# Patient Record
Sex: FEMALE | Race: WHITE | Employment: OTHER | URBAN - METROPOLITAN AREA
[De-identification: names, ages, dates, MRNs, and addresses within clinical notes are randomized per-mention and may not be internally consistent; named-entity substitution may affect disease eponyms.]

---

## 2020-10-26 ENCOUNTER — APPOINTMENT (OUTPATIENT)
Dept: GENERAL RADIOLOGY | Facility: HOSPITAL | Age: 80
DRG: 023 | End: 2020-10-26
Attending: EMERGENCY MEDICINE
Payer: MEDICARE

## 2020-10-26 ENCOUNTER — APPOINTMENT (OUTPATIENT)
Dept: CT IMAGING | Facility: HOSPITAL | Age: 80
DRG: 023 | End: 2020-10-26
Attending: NURSE PRACTITIONER
Payer: MEDICARE

## 2020-10-26 ENCOUNTER — HOSPITAL ENCOUNTER (INPATIENT)
Facility: HOSPITAL | Age: 80
LOS: 13 days | Discharge: NURSING, REHAB OR LONG-TERM CARE | DRG: 023 | End: 2020-11-08
Attending: EMERGENCY MEDICINE | Admitting: HOSPITALIST
Payer: MEDICARE

## 2020-10-26 ENCOUNTER — APPOINTMENT (OUTPATIENT)
Dept: GENERAL RADIOLOGY | Facility: HOSPITAL | Age: 80
DRG: 023 | End: 2020-10-26
Attending: NURSE PRACTITIONER
Payer: MEDICARE

## 2020-10-26 ENCOUNTER — ANESTHESIA (OUTPATIENT)
Dept: INTERVENTIONAL RADIOLOGY/VASCULAR | Facility: HOSPITAL | Age: 80
DRG: 023 | End: 2020-10-26
Payer: MEDICARE

## 2020-10-26 ENCOUNTER — APPOINTMENT (OUTPATIENT)
Dept: INTERVENTIONAL RADIOLOGY/VASCULAR | Facility: HOSPITAL | Age: 80
DRG: 023 | End: 2020-10-26
Attending: EMERGENCY MEDICINE
Payer: MEDICARE

## 2020-10-26 ENCOUNTER — APPOINTMENT (OUTPATIENT)
Dept: CT IMAGING | Facility: HOSPITAL | Age: 80
DRG: 023 | End: 2020-10-26
Attending: INTERNAL MEDICINE
Payer: MEDICARE

## 2020-10-26 ENCOUNTER — APPOINTMENT (OUTPATIENT)
Dept: CT IMAGING | Facility: HOSPITAL | Age: 80
DRG: 023 | End: 2020-10-26
Attending: EMERGENCY MEDICINE
Payer: MEDICARE

## 2020-10-26 DIAGNOSIS — I48.92 ATRIAL FLUTTER BY ELECTROCARDIOGRAM (HCC): ICD-10-CM

## 2020-10-26 DIAGNOSIS — I63.311 CEREBROVASCULAR ACCIDENT (CVA) DUE TO THROMBOSIS OF RIGHT MIDDLE CEREBRAL ARTERY (HCC): Primary | ICD-10-CM

## 2020-10-26 PROCEDURE — 70450 CT HEAD/BRAIN W/O DYE: CPT | Performed by: NURSE PRACTITIONER

## 2020-10-26 PROCEDURE — 3E03317 INTRODUCTION OF OTHER THROMBOLYTIC INTO PERIPHERAL VEIN, PERCUTANEOUS APPROACH: ICD-10-PCS | Performed by: RADIOLOGY

## 2020-10-26 PROCEDURE — 70450 CT HEAD/BRAIN W/O DYE: CPT | Performed by: INTERNAL MEDICINE

## 2020-10-26 PROCEDURE — 03CG3Z7 EXTIRPATION OF MATTER FROM INTRACRANIAL ARTERY USING STENT RETRIEVER, PERCUTANEOUS APPROACH: ICD-10-PCS | Performed by: RADIOLOGY

## 2020-10-26 PROCEDURE — 70450 CT HEAD/BRAIN W/O DYE: CPT | Performed by: EMERGENCY MEDICINE

## 2020-10-26 PROCEDURE — 99223 1ST HOSP IP/OBS HIGH 75: CPT | Performed by: INTERNAL MEDICINE

## 2020-10-26 PROCEDURE — 74018 RADEX ABDOMEN 1 VIEW: CPT | Performed by: NURSE PRACTITIONER

## 2020-10-26 PROCEDURE — B313YZZ FLUOROSCOPY OF RIGHT COMMON CAROTID ARTERY USING OTHER CONTRAST: ICD-10-PCS | Performed by: RADIOLOGY

## 2020-10-26 PROCEDURE — B316YZZ FLUOROSCOPY OF RIGHT INTERNAL CAROTID ARTERY USING OTHER CONTRAST: ICD-10-PCS | Performed by: RADIOLOGY

## 2020-10-26 PROCEDURE — B31RYZZ FLUOROSCOPY OF INTRACRANIAL ARTERIES USING OTHER CONTRAST: ICD-10-PCS | Performed by: RADIOLOGY

## 2020-10-26 PROCEDURE — B31QYZZ FLUOROSCOPY OF CERVICO-CEREBRAL ARCH USING OTHER CONTRAST: ICD-10-PCS | Performed by: RADIOLOGY

## 2020-10-26 PROCEDURE — 72125 CT NECK SPINE W/O DYE: CPT | Performed by: NURSE PRACTITIONER

## 2020-10-26 PROCEDURE — 99291 CRITICAL CARE FIRST HOUR: CPT | Performed by: INTERNAL MEDICINE

## 2020-10-26 PROCEDURE — 72040 X-RAY EXAM NECK SPINE 2-3 VW: CPT | Performed by: EMERGENCY MEDICINE

## 2020-10-26 PROCEDURE — 70496 CT ANGIOGRAPHY HEAD: CPT | Performed by: EMERGENCY MEDICINE

## 2020-10-26 PROCEDURE — 70498 CT ANGIOGRAPHY NECK: CPT | Performed by: EMERGENCY MEDICINE

## 2020-10-26 PROCEDURE — 71045 X-RAY EXAM CHEST 1 VIEW: CPT | Performed by: EMERGENCY MEDICINE

## 2020-10-26 PROCEDURE — 99292 CRITICAL CARE ADDL 30 MIN: CPT | Performed by: INTERNAL MEDICINE

## 2020-10-26 PROCEDURE — 30233M1 TRANSFUSION OF NONAUTOLOGOUS PLASMA CRYOPRECIPITATE INTO PERIPHERAL VEIN, PERCUTANEOUS APPROACH: ICD-10-PCS | Performed by: INTERNAL MEDICINE

## 2020-10-26 RX ORDER — DILTIAZEM HYDROCHLORIDE 60 MG/1
60 TABLET, FILM COATED ORAL 2 TIMES DAILY
Status: ON HOLD | COMMUNITY
End: 2020-11-08

## 2020-10-26 RX ORDER — TRAMADOL HYDROCHLORIDE 50 MG/1
50 TABLET ORAL EVERY 6 HOURS PRN
COMMUNITY
End: 2021-01-04

## 2020-10-26 RX ORDER — ONDANSETRON 2 MG/ML
4 INJECTION INTRAMUSCULAR; INTRAVENOUS EVERY 6 HOURS PRN
Status: DISCONTINUED | OUTPATIENT
Start: 2020-10-26 | End: 2020-11-08

## 2020-10-26 RX ORDER — LABETALOL HYDROCHLORIDE 5 MG/ML
10 INJECTION, SOLUTION INTRAVENOUS EVERY 10 MIN PRN
Status: DISCONTINUED | OUTPATIENT
Start: 2020-10-26 | End: 2020-10-27

## 2020-10-26 RX ORDER — LORAZEPAM 2 MG/ML
INJECTION INTRAMUSCULAR
Status: DISCONTINUED
Start: 2020-10-26 | End: 2020-10-26 | Stop reason: WASHOUT

## 2020-10-26 RX ORDER — SODIUM CHLORIDE 9 MG/ML
INJECTION, SOLUTION INTRAVENOUS CONTINUOUS
Status: DISCONTINUED | OUTPATIENT
Start: 2020-10-26 | End: 2020-10-26

## 2020-10-26 RX ORDER — METOCLOPRAMIDE HYDROCHLORIDE 5 MG/ML
10 INJECTION INTRAMUSCULAR; INTRAVENOUS EVERY 8 HOURS PRN
Status: DISCONTINUED | OUTPATIENT
Start: 2020-10-26 | End: 2020-10-27

## 2020-10-26 RX ORDER — ACETAMINOPHEN 650 MG/1
650 SUPPOSITORY RECTAL EVERY 4 HOURS PRN
Status: DISCONTINUED | OUTPATIENT
Start: 2020-10-26 | End: 2020-10-27

## 2020-10-26 RX ORDER — SODIUM CHLORIDE, SODIUM LACTATE, POTASSIUM CHLORIDE, CALCIUM CHLORIDE 600; 310; 30; 20 MG/100ML; MG/100ML; MG/100ML; MG/100ML
INJECTION, SOLUTION INTRAVENOUS CONTINUOUS
Status: DISCONTINUED | OUTPATIENT
Start: 2020-10-26 | End: 2020-10-26

## 2020-10-26 RX ORDER — HYDRALAZINE HYDROCHLORIDE 20 MG/ML
10 INJECTION INTRAMUSCULAR; INTRAVENOUS EVERY 2 HOUR PRN
Status: DISCONTINUED | OUTPATIENT
Start: 2020-10-26 | End: 2020-10-27

## 2020-10-26 RX ORDER — METHYLPREDNISOLONE SODIUM SUCCINATE 125 MG/2ML
125 INJECTION, POWDER, LYOPHILIZED, FOR SOLUTION INTRAMUSCULAR; INTRAVENOUS ONCE AS NEEDED
Status: DISCONTINUED | OUTPATIENT
Start: 2020-10-26 | End: 2020-10-26

## 2020-10-26 RX ORDER — IPRATROPIUM BROMIDE AND ALBUTEROL SULFATE 2.5; .5 MG/3ML; MG/3ML
3 SOLUTION RESPIRATORY (INHALATION) EVERY 6 HOURS PRN
Status: DISCONTINUED | OUTPATIENT
Start: 2020-10-26 | End: 2020-11-08

## 2020-10-26 RX ORDER — METOPROLOL TARTRATE 5 MG/5ML
2.5 INJECTION INTRAVENOUS EVERY 6 HOURS
Status: DISCONTINUED | OUTPATIENT
Start: 2020-10-26 | End: 2020-10-28

## 2020-10-26 RX ORDER — SODIUM CHLORIDE 9 MG/ML
INJECTION, SOLUTION INTRAVENOUS CONTINUOUS PRN
Status: DISCONTINUED | OUTPATIENT
Start: 2020-10-26 | End: 2020-10-26 | Stop reason: SURG

## 2020-10-26 RX ORDER — NALOXONE HYDROCHLORIDE 0.4 MG/ML
80 INJECTION, SOLUTION INTRAMUSCULAR; INTRAVENOUS; SUBCUTANEOUS AS NEEDED
Status: DISCONTINUED | OUTPATIENT
Start: 2020-10-26 | End: 2020-10-26

## 2020-10-26 RX ORDER — DIPHENHYDRAMINE HYDROCHLORIDE 50 MG/ML
50 INJECTION INTRAMUSCULAR; INTRAVENOUS ONCE AS NEEDED
Status: ACTIVE | OUTPATIENT
Start: 2020-10-26 | End: 2020-10-26

## 2020-10-26 RX ORDER — LABETALOL HYDROCHLORIDE 5 MG/ML
10 INJECTION, SOLUTION INTRAVENOUS ONCE AS NEEDED
Status: DISCONTINUED | OUTPATIENT
Start: 2020-10-26 | End: 2020-10-26

## 2020-10-26 RX ORDER — HEPARIN SODIUM 5000 [USP'U]/ML
INJECTION, SOLUTION INTRAVENOUS; SUBCUTANEOUS
Status: COMPLETED
Start: 2020-10-26 | End: 2020-10-26

## 2020-10-26 RX ORDER — CAFFEINE CITRATE 20 MG/ML
SOLUTION ORAL
Status: COMPLETED
Start: 2020-10-26 | End: 2020-10-26

## 2020-10-26 RX ORDER — SODIUM CHLORIDE 9 MG/ML
1000 INJECTION, SOLUTION INTRAVENOUS ONCE
Status: COMPLETED | OUTPATIENT
Start: 2020-10-26 | End: 2020-10-26

## 2020-10-26 RX ORDER — MONTELUKAST SODIUM 10 MG/1
10 TABLET ORAL NIGHTLY
COMMUNITY

## 2020-10-26 RX ORDER — LIDOCAINE HYDROCHLORIDE 10 MG/ML
INJECTION, SOLUTION INFILTRATION; PERINEURAL
Status: COMPLETED
Start: 2020-10-26 | End: 2020-10-26

## 2020-10-26 RX ORDER — FAMOTIDINE 10 MG/ML
20 INJECTION, SOLUTION INTRAVENOUS ONCE AS NEEDED
Status: DISCONTINUED | OUTPATIENT
Start: 2020-10-26 | End: 2020-10-26

## 2020-10-26 RX ORDER — BUDESONIDE AND FORMOTEROL FUMARATE DIHYDRATE 160; 4.5 UG/1; UG/1
2 AEROSOL RESPIRATORY (INHALATION) 2 TIMES DAILY
COMMUNITY

## 2020-10-26 RX ORDER — ACETAMINOPHEN 10 MG/ML
1000 INJECTION, SOLUTION INTRAVENOUS EVERY 6 HOURS PRN
Status: DISCONTINUED | OUTPATIENT
Start: 2020-10-26 | End: 2020-10-28

## 2020-10-26 RX ORDER — SODIUM CHLORIDE 9 MG/ML
INJECTION, SOLUTION INTRAVENOUS CONTINUOUS
Status: DISCONTINUED | OUTPATIENT
Start: 2020-10-26 | End: 2020-10-28

## 2020-10-26 RX ORDER — FAMOTIDINE 10 MG/ML
20 INJECTION, SOLUTION INTRAVENOUS ONCE AS NEEDED
Status: ACTIVE | OUTPATIENT
Start: 2020-10-26 | End: 2020-10-26

## 2020-10-26 RX ORDER — METHYLPREDNISOLONE SODIUM SUCCINATE 125 MG/2ML
125 INJECTION, POWDER, LYOPHILIZED, FOR SOLUTION INTRAMUSCULAR; INTRAVENOUS ONCE AS NEEDED
Status: ACTIVE | OUTPATIENT
Start: 2020-10-26 | End: 2020-10-26

## 2020-10-26 RX ORDER — MORPHINE SULFATE 2 MG/ML
2 INJECTION, SOLUTION INTRAMUSCULAR; INTRAVENOUS EVERY 4 HOURS PRN
Status: DISCONTINUED | OUTPATIENT
Start: 2020-10-26 | End: 2020-10-28

## 2020-10-26 RX ORDER — DILTIAZEM HYDROCHLORIDE 60 MG/1
60 TABLET, FILM COATED ORAL 2 TIMES DAILY
Status: DISCONTINUED | OUTPATIENT
Start: 2020-10-26 | End: 2020-10-26

## 2020-10-26 RX ORDER — MONTELUKAST SODIUM 10 MG/1
10 TABLET ORAL NIGHTLY
Status: DISCONTINUED | OUTPATIENT
Start: 2020-10-26 | End: 2020-10-31

## 2020-10-26 RX ADMIN — SODIUM CHLORIDE: 9 INJECTION, SOLUTION INTRAVENOUS at 11:58:00

## 2020-10-26 RX ADMIN — SODIUM CHLORIDE: 9 INJECTION, SOLUTION INTRAVENOUS at 12:41:00

## 2020-10-26 RX ADMIN — SODIUM CHLORIDE: 9 INJECTION, SOLUTION INTRAVENOUS at 12:30:00

## 2020-10-26 RX ADMIN — SODIUM CHLORIDE: 9 INJECTION, SOLUTION INTRAVENOUS at 13:26:00

## 2020-10-26 NOTE — ANESTHESIA POSTPROCEDURE EVALUATION
[address] [facility] Patient Status:  Emergency   Age/Gender 79year old female MRN [id number]   Location 60 [address] [facility] Attending No att. providers found   [facility] Day # 0 PCP NAHUM ONEAL       Anesthesia Post-op Note

## 2020-10-26 NOTE — PROCEDURES
[facility]  Neurointerventional Surgery Operative Report  Aashish Yang Patient Status:  Emergency    10/9/1940 MRN [id number]   Location 60 [address] [facility] Attending No att. providers found   Hosp Day # 0 PCP Geovanny Duncan

## 2020-10-26 NOTE — ED NOTES
Confirmed with EMS at this time that glucose was 142 enroute. Pt lying on cart, eyes deviated to right, neglectful of left side, but speaking with staff, able to answer some questions correctly.     Per ems, pt was seated in a chair, got up to go to the

## 2020-10-26 NOTE — ED PROVIDER NOTES
Patient Seen in: [facility] Emergency Department      History   Patient presents with:  Stroke    Stated Complaint: stroke/fall    HPI    79-year-old female presents to the emergency department with acute strokelike symptoms.   Patient woke up around the right   Neck:      Comments: Patient in c-collar she does not have distinct midline tenderness is complaining more of pain paracervical.  C-collar was kept in place  Cardiovascular:      Rate and Rhythm: Normal rate. Rhythm irregular.       Heart sounds within normal limits   PRO BETA NATRIURETIC PEPTIDE - Abnormal; Notable for the following components:    Pro-Beta Natriuretic Peptide 557 (*)     All other components within normal limits   CBC W/ DIFFERENTIAL - Abnormal; Notable for the following componen None.  INDICATIONS:  stroke/fall  TECHNIQUE:  Noncontrast CT scanning is performed through the brain. Dose reduction techniques were used.  Dose information is transmitted to the ACR ([facility]) Jaja Taveras [address] ([address]) reformats were performed through the carotid and vertebral arteries. All measurements obtained in this exam were performed using NASCET. Dose reduction techniques were used.  Dose information is transmitted to the [facility] of [facility]iology) [address] (N vertebral arteries are widely patent. The left vertebral artery is mildly dominant. Left frontal subcutaneous hematoma is again noted.              CONCLUSION:  Large thrombus in the right M1 segment extending from the distal supraclinoid right internal c cervical trauma series was ordered but patient emergently went to the IR lab. Admission disposition: 10/26/2020 11:49 AM           tPA administered after discussion of risks, benefits and alternatives to IV tPA with patient and family and patient.    All i

## 2020-10-26 NOTE — ANESTHESIA PROCEDURE NOTES
Arterial Line  Performed by: Lilo Duarte MD  Authorized by: Lilo Duarte MD     General Information and Staff    Procedure Start:  10/26/2020 12:04 PM  Procedure End:  10/26/2020 12:08 PM  Anesthesiologist:  Lilo Duarte MD  Patient Location:

## 2020-10-26 NOTE — PROGRESS NOTES
[id number] Mckenzie Zepeda Neurology Initial Evaluation    [address] - [facility] Patient Status:  Emergency    10/9/1940 MRN [id number]   Location 60 [address] [facility] Attending No att. providers found   Hosp Day # 0  [address] injection, , ,     •  0.9% NaCl infusion, , Intravenous, Continuous    •  acetaminophen (TYLENOL) 650 MG rectal suppository 650 mg, 650 mg, Rectal, Q4H PRN    •  alteplase (ACTIVASE) 51.8 mg in Sterile Water for Injection 51.8 mL infusion, 0.81 mg/kg, Intr HCT 48.7 10/26/2020    .0 10/26/2020    CREATSERUM 1.04 10/26/2020    BUN 38 10/26/2020     10/26/2020    K 3.7 10/26/2020     10/26/2020    CO2 19.0 10/26/2020     10/26/2020    CA 9.4 10/26/2020    ALB 3.7 10/26/2020    ALKPH

## 2020-10-26 NOTE — CONSULTS
[facility]  Neurocritical Care Consult Note    Blossom Chowdhuryelijah Patient Status:  Emergency    10/9/1940 MRN [id number]   Location 60 [address] [facility] Attending No att. providers found   Hosp Day # 0 PCP [facility] status:       Spouse name: Not on file      Number of children: Not on file      Years of education: Not on file      Highest education level: Not on file    No family history on file.     Current Meds:    •  alteplase (ACTIVASE) 100 MG injection, , 10/26/2020 1326  Gross per 24 hour   Intake 700 ml   Output —   Net 700 ml       Physical Examination:   General- No acute distress, +bruising to L forehead and eye  CV- RRR  Resp- CTA Bilat  Neuro-  · Mental status- awake and alert, regards and follows co was performed.     Dictated by (CST): Jennifer Allen MD on 10/26/2020 at 11:23 AM     Finalized by (CST): Jennifer Allen MD on 10/26/2020 at 11:26 AM       Ct Stroke Cta Brain/cta Neck (w Iv)(cpt=70496/98146)    Result Date: 10/26/2020  CONCLUSION: discussions with the patient, family, and clinical staff. Thank you for allowing me to participate in the care of this patient.      Shana Pearce MD  Medical Director of System Neurosciences  Chief, Section of [address] [place] [address]

## 2020-10-26 NOTE — ANESTHESIA PREPROCEDURE EVALUATION
PRE-OP EVALUATION    Patient Name: Marlen Woody    Pre-op Diagnosis: Acute stroke    Procedure: IR INTRA ARTERIAL STROKE INTERVENTION      Pre-op vitals reviewed.   Temp: 97.5 °F (36.4 °C)  Pulse: 98  Resp: 18  BP: 135/109  SpO2: 98 %  There is no heigh record. Anesthesia Evaluation    Patient summary reviewed.     Anesthetic Complications  (-) history of anesthetic complications         GI/Hepatic/Renal  Comment: Tube feeds for chronic dysphagia                               Cardiovascular Admission:  **None**

## 2020-10-26 NOTE — H&P
[facility]  History and Physical     Zena Iglesias Patient Status:  Inpatient    10/9/1940 MRN [id number]   [facility] 6NE-A Attending Concetta Lockwood MD   Hosp Day # 0 DIYA Wells     Chief Complaint: AMS, fall    His 3.  HEENT: large left forehead hematoma . dry mucous membranes. EOM-I. PERRLA. Anicteric. Neck: No lymphadenopathy. Respiratory: Clear to auscultation bilaterally. No wheezes. No rhonchi. Cardiovascular: S1, S2.  IRIR  Chest and Back: No tenderness or de inpatient services that will reasonably be expected to span two midnight's based on the clinical documentation in H+P. Based on patients current state of illness, I anticipate that, after discharge, patient will require TBD.

## 2020-10-26 NOTE — PROCEDURES
[facility]  Pre-Procedure Note  Daysi Lo Patient Status:  Emergency    10/9/1940 MRN [id number]   Location 60 [address] [facility] Attending No att. providers found   Hosp Day # 0 PCP NAHUM ONEAL     Pre-Op Diagnosis:  MADHAVI KUMARI

## 2020-10-26 NOTE — PLAN OF CARE
Received pt from Neuro Lab at 1330. Pt A/O x4, LAMAS, following commands, L sided weakness noted, see neuro flowsheets for complete assessments. L eye swollen with bruising. VSS, NSR on tele. SBP monitored and maintained 120-160 via L radial a-line.  R groin Initiate appropriate interventions as ordered  Outcome: Progressing  Goal: Achieves maximal functionality and self care  Description: INTERVENTIONS  - Monitor swallowing and airway patency with patient fatigue and changes in neurological status  - Encourag

## 2020-10-27 ENCOUNTER — APPOINTMENT (OUTPATIENT)
Dept: CV DIAGNOSTICS | Facility: HOSPITAL | Age: 80
DRG: 023 | End: 2020-10-27
Attending: INTERNAL MEDICINE
Payer: MEDICARE

## 2020-10-27 ENCOUNTER — APPOINTMENT (OUTPATIENT)
Dept: CT IMAGING | Facility: HOSPITAL | Age: 80
DRG: 023 | End: 2020-10-27
Attending: EMERGENCY MEDICINE
Payer: MEDICARE

## 2020-10-27 PROCEDURE — 99291 CRITICAL CARE FIRST HOUR: CPT | Performed by: INTERNAL MEDICINE

## 2020-10-27 PROCEDURE — 99232 SBSQ HOSP IP/OBS MODERATE 35: CPT | Performed by: NURSE PRACTITIONER

## 2020-10-27 PROCEDURE — 93306 TTE W/DOPPLER COMPLETE: CPT | Performed by: INTERNAL MEDICINE

## 2020-10-27 PROCEDURE — 99232 SBSQ HOSP IP/OBS MODERATE 35: CPT | Performed by: INTERNAL MEDICINE

## 2020-10-27 PROCEDURE — 70450 CT HEAD/BRAIN W/O DYE: CPT | Performed by: EMERGENCY MEDICINE

## 2020-10-27 RX ORDER — DOCUSATE SODIUM 100 MG/1
100 CAPSULE, LIQUID FILLED ORAL 2 TIMES DAILY PRN
Status: DISCONTINUED | OUTPATIENT
Start: 2020-10-27 | End: 2020-10-28

## 2020-10-27 RX ORDER — METOPROLOL TARTRATE 5 MG/5ML
2.5 INJECTION INTRAVENOUS ONCE
Status: COMPLETED | OUTPATIENT
Start: 2020-10-27 | End: 2020-10-27

## 2020-10-27 RX ORDER — EZETIMIBE 10 MG/1
10 TABLET ORAL NIGHTLY
Status: DISCONTINUED | OUTPATIENT
Start: 2020-10-27 | End: 2020-10-31

## 2020-10-27 RX ORDER — HYDRALAZINE HYDROCHLORIDE 20 MG/ML
10 INJECTION INTRAMUSCULAR; INTRAVENOUS EVERY 2 HOUR PRN
Status: DISCONTINUED | OUTPATIENT
Start: 2020-10-27 | End: 2020-10-28

## 2020-10-27 RX ORDER — METOCLOPRAMIDE HYDROCHLORIDE 5 MG/ML
10 INJECTION INTRAMUSCULAR; INTRAVENOUS EVERY 8 HOURS PRN
Status: DISCONTINUED | OUTPATIENT
Start: 2020-10-27 | End: 2020-10-30

## 2020-10-27 RX ORDER — ONDANSETRON 2 MG/ML
4 INJECTION INTRAMUSCULAR; INTRAVENOUS EVERY 6 HOURS PRN
Status: DISCONTINUED | OUTPATIENT
Start: 2020-10-27 | End: 2020-10-30

## 2020-10-27 RX ORDER — ENOXAPARIN SODIUM 100 MG/ML
40 INJECTION SUBCUTANEOUS DAILY
Status: DISCONTINUED | OUTPATIENT
Start: 2020-10-27 | End: 2020-10-31

## 2020-10-27 RX ORDER — DEXTROSE MONOHYDRATE 25 G/50ML
INJECTION, SOLUTION INTRAVENOUS
Status: COMPLETED
Start: 2020-10-27 | End: 2020-10-27

## 2020-10-27 RX ORDER — LABETALOL HYDROCHLORIDE 5 MG/ML
10 INJECTION, SOLUTION INTRAVENOUS EVERY 10 MIN PRN
Status: DISCONTINUED | OUTPATIENT
Start: 2020-10-27 | End: 2020-10-30

## 2020-10-27 RX ORDER — ASPIRIN 325 MG
325 TABLET ORAL DAILY
Status: DISCONTINUED | OUTPATIENT
Start: 2020-10-27 | End: 2020-10-31

## 2020-10-27 NOTE — PROGRESS NOTES
[facility]  Progress Note     Valentine Kunz Patient Status:  Inpatient    10/9/1940 MRN [id number]   [facility] 6NE-A Attending Chadwick Diaz MD   Hosp Day # 1 PCP Eloy Camarena     Chief Complaint: CVA    S: Patient reports Daily   • aspirin  325 mg Per G Tube Daily   • Montelukast Sodium  10 mg Oral Nightly   • Fluticasone Furoate-Vilanterol  1 puff Inhalation Daily   • metoprolol Tartrate  2.5 mg Intravenous Q6H       ASSESSMENT / PLAN:     1.  Acute right MCA stroke with he

## 2020-10-27 NOTE — CONSULTS
[id number] Mckenzie Zepeda Interventional Neuro Radiology Consult    [facility] [address] [facility] Patient Status:  Inpatient    10/9/1940 MRN [id number]   [facility] 6NE-A Attending Leta Sol MD   Hosp Day # 1 DIYA [place] HISTORY:  Past Medical History:   Diagnosis Date   • Arrhythmia - afib (not on AC therapy due to GI bleed)    • Cancer (HCC)     Dysphagia - PEG tube for 17 years    • Chronic obstructive pulmonary disease    • Osteoarthritis    • Pneumonia due to organism Labetalol HCl (TRANDATE) injection 10 mg, 10 mg, Intravenous, Q10 Min PRN    •  hydrALAzine HCl (APRESOLINE) injection 10 mg, 10 mg, Intravenous, Q2H PRN    •  acetaminophen (OFIRMEV) infusion 1,000 mg, 1,000 mg, Intravenous, Q6H PRN    •  ondansetron HCl Finger-to-nose coordination is intact, slower on the left.           DIAGNOSTIC DATA:   Lab Results   Component Value Date    WBC 7.9 10/27/2020    HGB 12.1 10/27/2020    HCT 37.8 10/27/2020    .0 10/27/2020    CREATSERUM 0.78 10/27/2020    BUN 27 10 hour post angio [facility] 10/26/2020  1. Again identified is area of hyperdensity within the right caudate lobe, right frontal lobe, and basal ganglia which appears less prominent than on prior examination.   This area hyperdensity may be secondary to contrast st Scc Acantholytic Histology Text: There are irregular masses of epidermal cells in the upper dermis.  Within the tumor masses, there are varying proportions of normal squamous cells and anaplastic squamous cells.  The anaplastic cells have variation in size and shape with hyperplasia and hyperchromasia of the nuclei, absence of intracellular bridges and varying degrees of keratinization.  An acantholytic pattern is noted.

## 2020-10-27 NOTE — DIETARY NOTE
[facility]    NUTRITION ASSESSMENT    Pt does not meet malnutrition criteria.     NUTRITION DIAGNOSIS/PROBLEM:    Predicted suboptimal energy intake related to medical therapy predicted to decrease ability to consume sufficient intake as evidenced by N grams protein/day (1.2-1.5 grams protein per kg)  Fluid: ~1 ml/kcal or per MD discretion    MONITOR AND EVALUATE/NUTRITION GOALS:    3. No signs of skin breakdown  4. Maintain lean body mass  5.  Tolerance of enteral nutrition without signs/symptoms of into

## 2020-10-27 NOTE — SLP NOTE
SPEECH/LANGUAGE/COGNITIVE EVALUATION - INPATIENT    Admission Date: 10/26/2020  Evaluation Date: 10/27/20    Reason for Referral: Stroke protocol; Altered diet consistency    ASSESSMENT & PLAN   ASSESSMENT & IMPRESSION  Order received as per stroke protocol stroke that would impair her ability to safely return home and safely complete premorbid ADL tasks at prior level of independence.  Skilled SLP services are necessary to improve cognitive-communication skills in order to increase pt's safety and independenc please contact Jeremias Cohen [phone]-SLP/L, pager [phone]  Speech-LanguagePathologist

## 2020-10-27 NOTE — PLAN OF CARE
Assumed care at 0730, patient resting in bed, A/Ox4, LAMAS, VSS, HR fluctuating between NSR/Afib/Aflutter, for full assessment see charting. For neuro assessments see charting. 24h post tPA administration CTA completed. Transfer orders received.  C/o pain jeffrey

## 2020-10-27 NOTE — PLAN OF CARE
Patient alert and oriented, conversing appropriately, speech clear. ROM and strength improving on left side, no change in sensation. See also neuro assessment flow sheet.  APN called and informed patient stated she only takes small sips PO and all other med

## 2020-10-27 NOTE — PROGRESS NOTES
[facility]  Neurocritical Care Progress Note     Teetee Stout Patient Status:  Inpatient    10/9/1940 MRN [id number]   [facility] 6NE-A Attending Gordy Houston MD   Hosp Day # 1 PCP Irma Navarro     Subjective: Nebulization, Q6H PRN    •  morphINE sulfate (PF) 2 MG/ML injection 2 mg, 2 mg, Intravenous, Q4H PRN    •  benzocaine-menthol (CEPACOL (SUGAR-FREE)) 1 lozenge, 1 lozenge, Oral, PRN    •  Montelukast Sodium (SINGULAIR) tab 10 mg, 10 mg, Oral, Nightly    • contrast staining.   Dictated by (CST): Celia Pierre MD on 10/26/2020 at 5:59 PM     Finalized by (CST): Celia Pierre MD on 10/26/2020 at 6:04 PM       Ct Brain Or Head (00397)    Result Date: 10/26/2020  CONCLUSION:   1.  New region of hyper attenu Central disc bulge with facet hypertrophic change. No spinal canal or neural foraminal stenosis. C3-C4:  Left paracentral posterior osteophyte without spinal canal or neural foraminal stenosis.  C4-C5:  Posterior osteophyte with left facet hypertrophic val[facility]iology) NRDR ([address]) which includes the Dose Index Registry. PATIENT STATED HISTORY: (As transcribed by Technologist)  Code stroke. Left sided weakness and right gaze prefence.     FINDINGS: No evidence of intracranial hemorrh 109 114*   CO2 19.0* 23.0   * 94   BUN 38* 27*   CREATSERUM 1.04* 0.78     Recent Labs   Lab 10/26/20  1109 10/27/20  0514   WBC 12.3* 7.9   HGB 15.7 12.1   .0 180.0     Assesment/Plan:     Neuro:  Acute R mca stroke- likely thromboembolic et

## 2020-10-27 NOTE — PLAN OF CARE
Problem: Impaired Cognition  Goal: Patient will exhibit improved attention, thought processing and/or memory  Description: Interventions:  - Minimize distractions in the room when full attention is required  - Allow additional time for processing after a

## 2020-10-28 ENCOUNTER — APPOINTMENT (OUTPATIENT)
Dept: GENERAL RADIOLOGY | Facility: HOSPITAL | Age: 80
DRG: 023 | End: 2020-10-28
Attending: NURSE PRACTITIONER
Payer: MEDICARE

## 2020-10-28 PROCEDURE — 74018 RADEX ABDOMEN 1 VIEW: CPT | Performed by: NURSE PRACTITIONER

## 2020-10-28 PROCEDURE — 99233 SBSQ HOSP IP/OBS HIGH 50: CPT | Performed by: HOSPITALIST

## 2020-10-28 PROCEDURE — 99223 1ST HOSP IP/OBS HIGH 75: CPT | Performed by: OTHER

## 2020-10-28 PROCEDURE — 99232 SBSQ HOSP IP/OBS MODERATE 35: CPT | Performed by: NURSE PRACTITIONER

## 2020-10-28 PROCEDURE — 99221 1ST HOSP IP/OBS SF/LOW 40: CPT | Performed by: INTERNAL MEDICINE

## 2020-10-28 RX ORDER — MORPHINE SULFATE 2 MG/ML
2 INJECTION, SOLUTION INTRAMUSCULAR; INTRAVENOUS EVERY 2 HOUR PRN
Status: DISCONTINUED | OUTPATIENT
Start: 2020-10-28 | End: 2020-10-30

## 2020-10-28 RX ORDER — MORPHINE SULFATE 2 MG/ML
1 INJECTION, SOLUTION INTRAMUSCULAR; INTRAVENOUS EVERY 2 HOUR PRN
Status: DISCONTINUED | OUTPATIENT
Start: 2020-10-28 | End: 2020-10-30

## 2020-10-28 RX ORDER — DEXTROSE AND SODIUM CHLORIDE 5; .9 G/100ML; G/100ML
INJECTION, SOLUTION INTRAVENOUS CONTINUOUS
Status: DISCONTINUED | OUTPATIENT
Start: 2020-10-28 | End: 2020-10-28

## 2020-10-28 RX ORDER — DILTIAZEM HYDROCHLORIDE 5 MG/ML
10 INJECTION INTRAVENOUS EVERY 2 HOUR PRN
Status: DISCONTINUED | OUTPATIENT
Start: 2020-10-28 | End: 2020-11-01

## 2020-10-28 RX ORDER — ASPIRIN 300 MG
300 SUPPOSITORY, RECTAL RECTAL ONCE
Status: COMPLETED | OUTPATIENT
Start: 2020-10-28 | End: 2020-10-28

## 2020-10-28 NOTE — PHYSICAL THERAPY NOTE
PHYSICAL THERAPY EVALUATION - INPATIENT     Room Number: 8306/4247-I  Evaluation Date: 10/28/2020  Type of Evaluation: Initial  Physician Order: PT Eval and Treat    Presenting Problem: Acute R MCA CVA with hemorrhagic conversion  Reason for Therapy: History  Past Medical History:   Diagnosis Date   • Arrhythmia    • [facility]    • COPD (chronic obstructive pulmonary disease) (HCC)    • Osteoarthritis    • Pneumonia due to organism    • Problems with swallowing    • Renal disorder    • Stroke ([facility] BALANCE  Static Sitting: Fair +  Dynamic Sitting: Fair  Static Standing: Fair  Dynamic Standing: Fair -    ADDITIONAL TESTS  Additional Tests: Modified Louise              Modified Louise: 4        NEUROLOGICAL FINDINGS:  murali-anogsia:   L side visual- to pay attention to her L side she was able to correct her sitting balance. Placed walker in front of her and placed both hands on the R side rail of the walker as she tries to stand herself up.  Cont to cued and performed neurofacilitation on the L side and below baseline and would benefit from skilled inpatient PT to address the above deficits to assist patient in returning to prior to level of function.   DISCHARGE RECOMMENDATIONS  PT Discharge Recommendations: Acute rehabilitation    PLAN  PT Treatment Plan

## 2020-10-28 NOTE — PROGRESS NOTES
[facility]  Progress Note     Teetee Stout Patient Status:  Inpatient    10/9/1940 MRN [id number]   [facility] 6NE-A Attending Gordy Houston MD   Hosp Day # 2 PCP Irma Navarro     Chief Complaint: CVA    S: Patient has natasha IVPB (peripheral line)  40 mEq Intravenous Once   • ezetimibe  10 mg Per G Tube Nightly   • enoxaparin  40 mg Subcutaneous Daily   • aspirin  325 mg Per G Tube Daily   • Montelukast Sodium  10 mg Oral Nightly   • Fluticasone Furoate-Vilanterol  1 puff Loral Socks

## 2020-10-28 NOTE — CONSULTS
[facility]  Electrophysiology Initial Consult Note      Marycarmen Hazel Patient Status:  Inpatient    10/9/1940 MRN [id number]   [facility] 7NE-A Attending Lawrence Davis, [address] [facility]  Day # 2 PCP Kassandra Estes Tube, Nightly  •  Enoxaparin Sodium (LOVENOX) 40 MG/0.4ML injection 40 mg, 40 mg, Subcutaneous, Daily  •  Labetalol HCl (TRANDATE) injection 10 mg, 10 mg, Intravenous, Q10 Min PRN  •  ondansetron HCl (ZOFRAN) injection 4 mg, 4 mg, Intravenous, Q6H PRN **OR 116*   CO2 19.0* 23.0 21.0         Recent Labs   Lab 10/26/20  1109 10/27/20  0514 10/28/20  0522   RBC 5.05 3.81 3.58*   HGB 15.7 12.1 11.3*   HCT 48.7* 37.8 35.5   MCV 96.4 99.2 99.2   MCH 31.1 31.8 31.6   MCHC 32.2 32.0 31.8   RDW 12.5 12.5 12.7   NEPRE

## 2020-10-28 NOTE — PROGRESS NOTES
[facility]  Interventional Neuroradiology Progress Note    Nasreenelza Woody Patient Status:  Inpatient    10/9/1940 MRN [id number]   [facility] 7NE-A Attending Cecilio Espinoza MD   Hosp Day # 2 PCP Camille Eli       Subjective:  Do • aspirin  300 mg Rectal Once   • ezetimibe  10 mg Per G Tube Nightly   • enoxaparin  40 mg Subcutaneous Daily   • aspirin  325 mg Per G Tube Daily   • Montelukast Sodium  10 mg Oral Nightly   • Fluticasone Furoate-Vilanterol  1 puff Inhalation Daily

## 2020-10-28 NOTE — PROGRESS NOTES
Patient informed of transfer to floor,  Queta Bates called and informed of transfer and room number. Queta Bates requested that the phone  he left be packed and sent with his wife's personal belongings.  Report called to Ilia Ortega RN and the nurse she's pre

## 2020-10-28 NOTE — CM/SW NOTE
Pt is an 80 yo female admitted for CVA. Pt lives with her  Peyton Wray in an apartment. Pt was +CVA. PT is recommending acute rehab.   Referral sent for PMR eval.  SW to f/u with d/c planning pending PMR eval.       10/28/20 1400   CM/SW Referral Data

## 2020-10-28 NOTE — OCCUPATIONAL THERAPY NOTE
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 These deficits impact the patient’s ability to participate in ADLs instrumental activities of daily living, rest and sleep, work, leisure and social participation.      The patient is functioning below her previous dressing:  with min assist and with cues  Patient will perform toileting: with mod assist    Functional Transfer Goals  Patient will transfer to toilet:  with min assist and with cues    UE Exercise Program Goal  Patient will be minimum assistance with lef

## 2020-10-28 NOTE — PLAN OF CARE
Patient awake; oriented x4  Telemetry, initially atrial fibrillation; rate 100s-110s at rest; 130s-140s with exertion; converted to Sinus Rhythm this morning   Left eye, facial and temporal bruising noted  Bilateral upper extremity bruising   C/o generaliz of bleeding to staff  - Avoid use of toothpicks and dental floss  - Use electric shaver for shaving  - Use soft bristle tooth brush  - Limit straining and forceful nose blowing  Outcome: Progressing     Problem: GASTROINTESTINAL - ADULT  Goal: Maintains ad Educate and engage patient/family in tolerated activity level and precautions    Outcome: Progressing    Problem: Impaired Swallowing  Goal: Minimize aspiration risk  Description: NPO  Outcome: Progressing     Problem: Impaired Cognition  Goal: Patient nanci

## 2020-10-28 NOTE — PLAN OF CARE
Pt arrived to unit at 2330 blood sugar 64 dextrose given. Both IVs leaking, new IV placed.   Pt is alert and oriented x 4.   Tele- A-fib   RA  purewick in place   Productive cough   Awaiting GI consult regarding peg tube leaking   Neuro checks per protocol

## 2020-10-28 NOTE — CONSULTS
Neurology H&P    Terrence Daniels Patient Status:  Inpatient    10/9/1940 MRN [id number]   [facility] 7NE-A Attending Roswell Sicard, MD   [facility] Day # 2 PCP Peter Zamora     Subjective:  Terrence Daniels is a(n) 79year old female with a pertinent positive and negatives stated in subjective. Objective/Physical Exam:    Vital Signs:  Blood pressure 128/76, pulse 116, temperature 98.6 °F (37 °C), temperature source Oral, resp.  rate 22, weight 137 lb 5.6 oz (62.3 kg), SpO2 96 %, not curren bleeds. She is currently on ASA 325mg Qday and Atorvastatin 80mg PO QHS. NI on consult. Plan:  1.   MCA Stroke - secondary to R MCA occlusion and s/p mechanical thrombectomy  - Transferred from ICU  - Unable to get MRI due to pacemaker incompatibility

## 2020-10-28 NOTE — CONSULTS
[facility]                       [facility]    [facility] Patient Status:  Inpatient    10/9/1940 MRN [id number]   [facility] 7NE-A Attending Lawrence Davis MD   Hosp Day # 2 PCP SYED RODRIGUEZ [facility] WITH DEVELOPMENTAL ARTERIAL STROKE INTERVENTION  10/26/2020        • LUMPECTOMY RIGHT       Medications:   •  potassium chloride 40 mEq in sodium chloride 0.9% 250 mL IVPB, 40 mEq, Intravenous, Once    •  metoprolol Tartrate (LOPRESSOR) tab 25 mg, 25 mg, Per G Tube, 2x Daily [COMPLETED] lidocaine (XYLOCAINE) 1 % injection, , ,     •  [] methylPREDNISolone Sodium Succ (Solu-MEDROL) injection 125 mg, 125 mg, Intravenous, Once PRN    And    •  [] diphenhydrAMINE HCl (BENADRYL) IV PUSH injection 50 mg, 50 mg, Francesca Heaton recurrent urinary tract infections, hematuria, dysuria, or nephrolithiasis           Psychiatric: The patient reports no history of depression, anxiety, suicidal ideation, or homicidal ideation           Oncologic: The patient reports no history of prior s MG 2.0 10/28/2020    PHOS 1.8 10/28/2020     Recent Labs   Lab 10/26/20  1109 10/27/20  0514 10/28/20  0522   * 94 89   BUN 38* 27* 20*   CREATSERUM 1.04* 0.78 0.73   GFRAA 58* 82 89   GFRNAA 51* 72 77   CA 9.4 8.4* 8.2*    142 142   K 3.7 found to have acute mca stroke s/p TPA and thrombectomy c/b hemorrhagic conversion. Had G tube placed ~25-30 years ago by surgery 2/2 to dysphagia. She has never had f/u since then w/ surgery.  Had issues with Gtube for years, and she has been obtaining dir

## 2020-10-29 ENCOUNTER — TELEPHONE (OUTPATIENT)
Dept: NEUROLOGY | Facility: CLINIC | Age: 80
End: 2020-10-29

## 2020-10-29 ENCOUNTER — APPOINTMENT (OUTPATIENT)
Dept: GENERAL RADIOLOGY | Facility: HOSPITAL | Age: 80
DRG: 023 | End: 2020-10-29
Attending: NURSE PRACTITIONER
Payer: MEDICARE

## 2020-10-29 ENCOUNTER — APPOINTMENT (OUTPATIENT)
Dept: GENERAL RADIOLOGY | Facility: HOSPITAL | Age: 80
DRG: 023 | End: 2020-10-29
Attending: HOSPITALIST
Payer: MEDICARE

## 2020-10-29 PROCEDURE — 99232 SBSQ HOSP IP/OBS MODERATE 35: CPT | Performed by: NURSE PRACTITIONER

## 2020-10-29 PROCEDURE — 99232 SBSQ HOSP IP/OBS MODERATE 35: CPT | Performed by: HOSPITALIST

## 2020-10-29 PROCEDURE — 71045 X-RAY EXAM CHEST 1 VIEW: CPT | Performed by: NURSE PRACTITIONER

## 2020-10-29 PROCEDURE — 99232 SBSQ HOSP IP/OBS MODERATE 35: CPT | Performed by: OTHER

## 2020-10-29 PROCEDURE — 71101 X-RAY EXAM UNILAT RIBS/CHEST: CPT | Performed by: HOSPITALIST

## 2020-10-29 PROCEDURE — 99232 SBSQ HOSP IP/OBS MODERATE 35: CPT | Performed by: INTERNAL MEDICINE

## 2020-10-29 RX ORDER — ACETAMINOPHEN 325 MG/1
650 TABLET ORAL EVERY 6 HOURS PRN
Status: DISCONTINUED | OUTPATIENT
Start: 2020-10-29 | End: 2020-10-29

## 2020-10-29 RX ORDER — POTASSIUM CHLORIDE 29.8 MG/ML
40 INJECTION INTRAVENOUS ONCE
Status: DISCONTINUED | OUTPATIENT
Start: 2020-10-29 | End: 2020-10-29

## 2020-10-29 RX ORDER — POTASSIUM CHLORIDE 14.9 MG/ML
20 INJECTION INTRAVENOUS ONCE
Status: DISCONTINUED | OUTPATIENT
Start: 2020-10-29 | End: 2020-10-29

## 2020-10-29 RX ORDER — IBUPROFEN 600 MG/1
600 TABLET ORAL EVERY 6 HOURS PRN
Status: DISCONTINUED | OUTPATIENT
Start: 2020-10-29 | End: 2020-10-30

## 2020-10-29 RX ORDER — ACETAMINOPHEN 500 MG
1000 TABLET ORAL EVERY 8 HOURS PRN
Status: DISCONTINUED | OUTPATIENT
Start: 2020-10-29 | End: 2020-10-30

## 2020-10-29 RX ORDER — POTASSIUM CHLORIDE 14.9 MG/ML
20 INJECTION INTRAVENOUS ONCE
Status: COMPLETED | OUTPATIENT
Start: 2020-10-29 | End: 2020-10-29

## 2020-10-29 NOTE — PROGRESS NOTES
[facility]  Progress Note     Luz Marina Figueroa Patient Status:  Inpatient    10/9/1940 MRN [id number]   [facility] 6NE-A Attending Willa More MD   Hosp Day # 3 PCP Gucci Snyder     Chief Complaint: CVA    S: Patient still h 1.8 0.6  --    TP 7.3  --  5.5* 5.6*  --     < > = values in this interval not displayed. Estimated Creatinine Clearance: 51.5 mL/min (based on SCr of 0.74 mg/dL).     Recent Labs   Lab 10/26/20  1109 10/27/20  0514   PTP 13.6 14.8*   INR 1.01 1.12* with pt and RN    Britt Johnson MD

## 2020-10-29 NOTE — PLAN OF CARE
Problem: Impaired Activities of Daily Living  Goal: Achieve highest/safest level of independence in self care  Description: Interventions:  - Assess ability and encourage patient to participate in ADLs to maximize function  - Promote sitting position [facility]

## 2020-10-29 NOTE — PHYSICAL THERAPY NOTE
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     Admitted with chronic dysphagia s/p PEG p/w acute • Arrhythmia    • Cancer [facility])    • COPD (chronic obstructive pulmonary disease) (HCC)    • Osteoarthritis    • Pneumonia due to organism    • Problems with swallowing    • Renal disorder    • Stroke [facility])        Past Surgical History  Past Surgical Hist Assistance:  Moderate assistance  Distance (ft): 3  Assistive Device: (B HHA)  Pattern: Shuffle     Comment : Walking towards the R side transferring to a transport bed    Skilled Therapy Provided: Gloves,goggles and surgical mask worn during treatment jessica facilitate to her highest  functional skills.  Recommend Acute rehab placement at d/c to maximize functional independence and safety with mobilities prior to going home      DISCHARGE RECOMMENDATIONS  PT Discharge Recommendations: Acute rehabilitation     P

## 2020-10-29 NOTE — DIETARY NOTE
[facility]    NUTRITION ASSESSMENT    Pt does not meet malnutrition criteria.     NUTRITION DIAGNOSIS/PROBLEM:    Predicted suboptimal energy intake related to medical therapy predicted to decrease ability to consume sufficient intake as evidenced by N EN  Intake Meeting Needs: TF meeting needs  Food Allergies: No  Cultural/Ethnic/Episcopal Preferences Addresses: Yes    NUTRITION RELATED PHYSICAL FINDINGS:     1. Body Fat/Muscle Mass: well nourished per visual exam.     2. Fluid Accumulation: none per RN

## 2020-10-29 NOTE — PROGRESS NOTES
[facility]  Interventional Neuroradiology Progress Note    Leorameetia Trever Patient Status:  Inpatient    10/9/1940 MRN [id number]   [facility] 7NE-A Attending Bienvenido Santa MD   Hosp Day # 3 PCP Smooth Schaefer       Subjective:  Do Fluticasone Furoate-Vilanterol  1 puff Inhalation Daily     Labs:  Lab Results   Component Value Date    WBC 9.8 10/29/2020    HGB 12.3 10/29/2020    HCT 37.4 10/29/2020    .0 10/29/2020    CREATSERUM 0.67 10/29/2020    BUN 18 10/29/2020     1

## 2020-10-29 NOTE — PROGRESS NOTES
[id number] [facility] Neurology Progress Note    Jesenia Car Patient Status:  Inpatient    10/9/1940 MRN [id number]   [facility] 7NE-A Attending Patrecia Cooks, MD   [facility] Day # 3 PCP Forestine Brake         Subjective:  Jesenia Car Tube 2x Daily(Beta Blocker)   • ezetimibe  10 mg Per G Tube Nightly   • enoxaparin  40 mg Subcutaneous Daily   • aspirin  325 mg Per G Tube Daily   • Montelukast Sodium  10 mg Oral Nightly   • Fluticasone Furoate-Vilanterol  1 puff Inhalation Daily       P to GI bleeds. She is currently on ASA 325mg Qday and Atorvastatin 80mg PO QHS. NI on consult.         Plan:  1.   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

## 2020-10-29 NOTE — PLAN OF CARE
Assumed care at [address] [place] [address]. C/o generalized pain-- given PRN IV Morphine with little relief. Hot packs given and repositioned frequently. Neuro checks q4h. Patient has normal sensation to the L arm and decreased sensation to L leg.  See charting for full asses cerebral perfusion and minimize risk of hemorrhage  - Monitor temperature, glucose, and sodium. Initiate appropriate interventions as ordered  - maintain sBP within targeted parameters of 120-180.   Outcome: Progressing  Goal: Achieves maximal functionality level and precautions  Outcome: Progressing     Problem: Impaired Activities of Daily Living  Goal: Achieve highest/safest level of independence in self care  Description: Interventions:  - Assess ability and encourage patient to participate in ADLs to max

## 2020-10-29 NOTE — PROGRESS NOTES
[facility]  Cardiology Progress Note    Teetee Stout Patient Status:  Inpatient    10/9/1940 MRN [id number]   [facility] 7NE-A Attending Noe Maguire MD   Hosp Day # 3 PCP Irma Navarro     Subjective:  Alert, complains of pro Krystal Tijerina 40, TAL  10/29/2020  7:35 AM    Patient seen and examined. Agree with above note and assessment. Continue with rate control. Was not on [facility] in the past due to GI bleeding. Start DOAC when ok with neurology.   Consider outpatient Watchman evaluat

## 2020-10-30 ENCOUNTER — APPOINTMENT (OUTPATIENT)
Dept: CT IMAGING | Facility: HOSPITAL | Age: 80
DRG: 023 | End: 2020-10-30
Attending: NURSE PRACTITIONER
Payer: MEDICARE

## 2020-10-30 PROCEDURE — 70450 CT HEAD/BRAIN W/O DYE: CPT | Performed by: NURSE PRACTITIONER

## 2020-10-30 PROCEDURE — 99232 SBSQ HOSP IP/OBS MODERATE 35: CPT | Performed by: NURSE PRACTITIONER

## 2020-10-30 PROCEDURE — 99233 SBSQ HOSP IP/OBS HIGH 50: CPT | Performed by: OTHER

## 2020-10-30 PROCEDURE — 99232 SBSQ HOSP IP/OBS MODERATE 35: CPT | Performed by: HOSPITALIST

## 2020-10-30 PROCEDURE — 99232 SBSQ HOSP IP/OBS MODERATE 35: CPT | Performed by: INTERNAL MEDICINE

## 2020-10-30 RX ORDER — DILTIAZEM HYDROCHLORIDE 60 MG/1
60 TABLET, FILM COATED ORAL EVERY 12 HOURS SCHEDULED
Status: DISCONTINUED | OUTPATIENT
Start: 2020-10-30 | End: 2020-10-31

## 2020-10-30 RX ORDER — METOPROLOL TARTRATE 50 MG/1
50 TABLET, FILM COATED ORAL
Status: DISCONTINUED | OUTPATIENT
Start: 2020-10-30 | End: 2020-10-31

## 2020-10-30 RX ORDER — ACETAMINOPHEN 500 MG
1000 TABLET ORAL EVERY 8 HOURS
Status: DISCONTINUED | OUTPATIENT
Start: 2020-10-30 | End: 2020-10-31

## 2020-10-30 RX ORDER — DILTIAZEM HYDROCHLORIDE 120 MG/1
120 CAPSULE, EXTENDED RELEASE ORAL DAILY
Status: DISCONTINUED | OUTPATIENT
Start: 2020-10-30 | End: 2020-10-30

## 2020-10-30 RX ORDER — POTASSIUM CHLORIDE 1.5 G/1.77G
40 POWDER, FOR SOLUTION ORAL EVERY 4 HOURS
Status: COMPLETED | OUTPATIENT
Start: 2020-10-30 | End: 2020-10-30

## 2020-10-30 NOTE — PROGRESS NOTES
[facility]  Progress Note     Juliusraghav Delcid Patient Status:  Inpatient    10/9/1940 MRN [id number]   [facility] 6NE-A Attending Wilver Conroy MD   Hosp Day # 4 PCP Yaritza Seth     Chief Complaint: CVA    S: Patient's pain Labs   Lab 10/26/20  1109 10/27/20  0514   PTP 13.6 14.8*   INR 1.01 1.12*       Recent Labs   Lab 10/26/20  1109   TROP <0.045            Imaging: Imaging data reviewed in Epic.     Medications:   • potassium chloride  40 mEq Oral Q4H   • metoprolol Marycarmen Russell

## 2020-10-30 NOTE — SLP NOTE
Attempted to see Pt for tx however Pt out of room for test.  Will follow-up at another time.    Sarai Howell, MS CCC-SLP/L, pager [phone]  Speech-LanguagePathologist

## 2020-10-30 NOTE — CM/SW NOTE
PMR giovanni indicates pt is appropriate for acute rehab. Spoke with pt and her  who want pt to go to [facility]. Pt not medically cleared for d/c yet.

## 2020-10-30 NOTE — PROGRESS NOTES
[id number] [facility] Neurology Progress Note    Shawn Schmidt Patient Status:  Inpatient    10/9/1940 MRN [id number]   [facility] 7NE-A Attending Ellyn Yanez MD   [address] Day # 4 PCP Cain Chappell       Subjective:  Shawn Schmidt inhaler 1 puff, 1 puff, Inhalation, Daily        REVIEW OF SYSTEMS:  A 10-point system was reviewed. Pertinent positives and negatives are noted in HPI.       PHYSICAL EXAMINATION:  VITAL SIGNS: /84 (BP Location: Right arm)   Pulse 118   Temp 97.8 °F from prior intervention although hemorrhage cannot be completely excluded. 2. Cerebral atrophy with small vessel changes. Stable area encephalomalacia within the left occipital lobe. 3. Large left-sided scalp hematoma.   4. Stable hyperdensity along the Daily   • aspirin  325 mg Per G Tube Daily   • Montelukast Sodium  10 mg Oral Nightly   • Fluticasone Furoate-Vilanterol  1 puff Inhalation Daily       PRN meds:  dextromethorphan-guaiFENesin, dilTIAZem HCl, ondansetron HCl, ipratropium-albuterol    Infusi DOAC  Awaiting CT follow up  Discussed case with care team  Plans as outlined in their notes above or below      Madhav Parish MD  Vascular & General Neurology  [facility]  10/30/2020    Decision making:         (   ) other records rev

## 2020-10-30 NOTE — PROGRESS NOTES
[facility]  Interventional Neuroradiology Progress Note    Trygve Felty Patient Status:  Inpatient    10/9/1940 MRN [id number]   [facility] 7NE-A Attending Carmelina Reeder MD   [facility] Day # 4 DIYA Cheng     Subjective:  Lien Rodriguez Imaging:  No new imaging    Assessment:  Respiratory distress     Right MCA stroke - 24 hours post Angio CT likely significantly improved, likely contrast staining just seen on the initial scan. Etiology likely embolic.  S/p thrombectomy with TICI 2c re

## 2020-10-30 NOTE — PROGRESS NOTES
[facility] Cardiology Progress Note    Subjective:  She is alert and conversive, and knows she has some confusion. She is seeing some animals.      Objective:  /77 (BP Location: Right arm)   Pulse 94   Temp 98.4 °F (36.9 °C) (Oral)   Resp (!) 36   Wt 137 [facility]  Cardiac Electrophysiolgy

## 2020-10-30 NOTE — PLAN OF CARE
Assumed care. Patient Alert and oriented x3 but still hallucinating. She is seeing people in the room and things that aren't there. Patient c/o pain, generalized. Worse with moving. Alternating between motrin and tylenol with only some relief.  Patient didn

## 2020-10-30 NOTE — CONSULTS
[address] [place] [address] Patient Status:  Inpatient    10/9/1940 MRN [id number]   [facility] 7NE-A Attending Fuad Márquez MD   [facility] Day # 4 PCP Cyndee Ruiz     Patient Identification  Daysi Lo is a 79year old fem    normal.   4. Pulmonary arteries: Systolic pressure was within the normal range,      estimated to be 27mm Hg. On chronic PEG feeds.     Oked to start Anticoagulants now, per IR    Physiatry consult obtained now to assess pt's funtional status and betzaida chloride 0.9% 250 mL IVPB, 40 mEq, Intravenous, Once    •  dilTIAZem HCl (CARDIZEM) injection 10 mg, 10 mg, Intravenous, Q2H PRN    •  [COMPLETED] aspirin 300 MG rectal suppository 300 mg, 300 mg, Rectal, Once    •  [COMPLETED] metoprolol Tartrate (Marlena Koki Furoate-Vilanterol (BREO ELLIPTA) 200-25 MCG/INH inhaler 1 puff, 1 puff, Inhalation, Daily        Social History    Tobacco Use      Smoking status: Not on file    Alcohol use: Not on file      History reviewed. No pertinent family history.   No h/o prematu Upper Extremity:  Strength is 4-5. ROM WNL. Left Upper Extremity:  Strength is 3. ROM WNL. Right Lower Extremity:  Strength  is 4. ROM WNL. Left Lower Extremity: Strength  is 2-3. ROM WNL. Neuro: CNII-XII are grossly intact.  Sensation

## 2020-10-31 ENCOUNTER — APPOINTMENT (OUTPATIENT)
Dept: GENERAL RADIOLOGY | Facility: HOSPITAL | Age: 80
DRG: 023 | End: 2020-10-31
Attending: INTERNAL MEDICINE
Payer: MEDICARE

## 2020-10-31 PROCEDURE — 71045 X-RAY EXAM CHEST 1 VIEW: CPT | Performed by: INTERNAL MEDICINE

## 2020-10-31 PROCEDURE — 99233 SBSQ HOSP IP/OBS HIGH 50: CPT | Performed by: OTHER

## 2020-10-31 PROCEDURE — 99233 SBSQ HOSP IP/OBS HIGH 50: CPT | Performed by: HOSPITALIST

## 2020-10-31 PROCEDURE — 74018 RADEX ABDOMEN 1 VIEW: CPT | Performed by: INTERNAL MEDICINE

## 2020-10-31 PROCEDURE — 99232 SBSQ HOSP IP/OBS MODERATE 35: CPT | Performed by: INTERNAL MEDICINE

## 2020-10-31 RX ORDER — METOPROLOL TARTRATE 5 MG/5ML
5 INJECTION INTRAVENOUS EVERY 6 HOURS SCHEDULED
Status: DISCONTINUED | OUTPATIENT
Start: 2020-10-31 | End: 2020-11-06

## 2020-10-31 RX ORDER — DILTIAZEM HYDROCHLORIDE 5 MG/ML
10 INJECTION INTRAVENOUS ONCE
Status: COMPLETED | OUTPATIENT
Start: 2020-10-31 | End: 2020-10-31

## 2020-10-31 RX ORDER — HEPARIN SODIUM AND DEXTROSE 10000; 5 [USP'U]/100ML; G/100ML
12 INJECTION INTRAVENOUS ONCE
Status: COMPLETED | OUTPATIENT
Start: 2020-10-31 | End: 2020-10-31

## 2020-10-31 RX ORDER — ACETAMINOPHEN 10 MG/ML
1000 INJECTION, SOLUTION INTRAVENOUS EVERY 6 HOURS PRN
Status: DISCONTINUED | OUTPATIENT
Start: 2020-10-31 | End: 2020-11-08

## 2020-10-31 RX ORDER — DEXTROSE, SODIUM CHLORIDE, SODIUM LACTATE, POTASSIUM CHLORIDE, AND CALCIUM CHLORIDE 5; .6; .31; .03; .02 G/100ML; G/100ML; G/100ML; G/100ML; G/100ML
INJECTION, SOLUTION INTRAVENOUS CONTINUOUS
Status: DISCONTINUED | OUTPATIENT
Start: 2020-10-31 | End: 2020-11-01

## 2020-10-31 RX ORDER — ASPIRIN 300 MG
300 SUPPOSITORY, RECTAL RECTAL DAILY
Status: DISCONTINUED | OUTPATIENT
Start: 2020-10-31 | End: 2020-10-31

## 2020-10-31 RX ORDER — HEPARIN SODIUM AND DEXTROSE 10000; 5 [USP'U]/100ML; G/100ML
INJECTION INTRAVENOUS CONTINUOUS
Status: DISCONTINUED | OUTPATIENT
Start: 2020-10-31 | End: 2020-10-31

## 2020-10-31 RX ORDER — HALOPERIDOL 5 MG/ML
1 INJECTION INTRAMUSCULAR EVERY 6 HOURS PRN
Status: DISCONTINUED | OUTPATIENT
Start: 2020-10-31 | End: 2020-11-08

## 2020-10-31 NOTE — PLAN OF CARE
Assumed care at 0730  Patient drowsy, awakens easily  A&Ox4, follows commands  Afib RVR initially on tele  Cardizem gtt started  IV lopressor added per cardiology  G tube placed to LIS, minimal dark green output  Patient denies nausea, no more episodes of

## 2020-10-31 NOTE — PROGRESS NOTES
I reviewed the CT head from 10/30, compared it to 10/27 and 10/26. R MCA territory infarcts with trace petechial density. Can start a low dose IV heparin regimen for Afib (APTT 50-70). Recheck CT head in 3 days.

## 2020-10-31 NOTE — PROGRESS NOTES
Assumed pt care @ 0730. Pt hallucinating, seeing dog and woman. Alert to self, date, place and situation \"I'm in hospital\", \"I had stroke\"   Asked CHARLI Remy regarding anticoagulant, Nita Long will discuss with Dr. Delvin Mitchell.    Repeat CT head ordered by

## 2020-10-31 NOTE — PROGRESS NOTES
Gastroenterology Follow-Up Note      Daysi Lo Patient Status:  Inpatient    10/9/1940 MRN [id number]   [facility] 7NE-A Attending Fuad Márquez MD   Hosp Day # 5 PCP Cyndee Ruiz     Chief Complaint/Reason for Follow Up:

## 2020-10-31 NOTE — PLAN OF CARE
Assumed care. Patient had emesis x1 early in the night, around 2130. Stopped TF when patient started vomiting. Checked residuals and removed 660 ml of gastric contents/TF. Patient feeling a little better after. GT site with drainage from site.  Reddened are

## 2020-10-31 NOTE — PROGRESS NOTES
[id number] [facility] Neurology Progress Note    Jody Mcmahon Patient Status:  Inpatient    10/9/1940 MRN [id number]   [facility] 7NE-A Attending Kamala Nicholson MD   [facility] Day # 5 PCP NAHUM Brand       Subjective:  Jenny Osullivan SIGNS: /90 (BP Location: Right arm)   Pulse 115   Temp 97.5 °F (36.4 °C) (Oral)   Resp 17   Wt 137 lb 5.6 oz (62.3 kg)   SpO2 98%   BMI 23.58 kg/m²   GENERAL:  Patient is a 79year old female in no acute distress.   HEENT:  Normocephalic, ecchymosis a midline shift.     CTH 10/26/20:  CONCLUSION:  1.  Again identified is area of hyperdensity within the right caudate lobe, right frontal lobe, and basal ganglia which appears less prominent than on prior examination.  This area hyperdensity may be secondary outlined in my notes (If none listed-I concur with their overall assessment/plan). No further confusional hallucinations per .   Now dealing with abdominal pains      Current Meds:  • pantoprazole (PROTONIX) IV push  40 mg Intravenous Q12H   • met mg/dL    Calculated Osmolality 299 (H) 275 - 295 mOsm/kg    GFR, Non- 77 >=60    GFR, -American 89 >=60    AST 46 (H) 15 - 37 U/L    ALT 69 (H) 13 - 56 U/L    Alkaline Phosphatase 139 55 - 142 U/L    Bilirubin, Total 1.0 0.1 - 2.0 mg definitive free air. CT may aid in further evaluation.    Dictated by (CST): Giuseppe Sanchez MD on 10/31/2020 at 7:33 AM     Finalized by (CST): Giuseppe Sanchez MD on 10/31/2020 at 7:38 AM       Ct Brain Or Head (11424)    Result Date: 10/30/2020  CO above    High risk    (   ) Drug monitoring    (   ) PCA    (   ) Organ failure    (   ) De-escalation of treatment - DNR    (   ) Acute neurologic changes    (   ) Others: New Rx    (   ) ICU >35 minutes total time   Available within moments call in hospi

## 2020-10-31 NOTE — PROGRESS NOTES
[facility] Cardiology Progress Note    Subjective:  She is alert and conversive, and knows she has some confusion. She is seeing some animals.      Objective:  /90 (BP Location: Right arm)   Pulse 115   Temp 97.5 °F (36.4 °C) (Oral)   Resp 17   Wt 137 lb

## 2020-10-31 NOTE — PROGRESS NOTES
[facility]  Progress Note     Karen Ureña Patient Status:  Inpatient    10/9/1940 MRN [id number]   [facility] 6NE-A Attending Meredith Starr MD   Hosp Day # 5 PCP Dilan Jones     Chief Complaint: CVA    S: Patient's hallu 0.7  --   --    TP 5.5* 5.6* 6.1*  --   --        Estimated Creatinine Clearance: 51.5 mL/min (based on SCr of 0.74 mg/dL).     Recent Labs   Lab 10/26/20  1109 10/27/20  0514   PTP 13.6 14.8*   INR 1.01 1.12*       Recent Labs   Lab 10/26/20  1109   TROP <

## 2020-11-01 ENCOUNTER — APPOINTMENT (OUTPATIENT)
Dept: GENERAL RADIOLOGY | Facility: HOSPITAL | Age: 80
DRG: 023 | End: 2020-11-01
Attending: INTERNAL MEDICINE
Payer: MEDICARE

## 2020-11-01 PROCEDURE — 74018 RADEX ABDOMEN 1 VIEW: CPT | Performed by: INTERNAL MEDICINE

## 2020-11-01 PROCEDURE — 99232 SBSQ HOSP IP/OBS MODERATE 35: CPT | Performed by: INTERNAL MEDICINE

## 2020-11-01 PROCEDURE — 99233 SBSQ HOSP IP/OBS HIGH 50: CPT | Performed by: HOSPITALIST

## 2020-11-01 RX ORDER — HEPARIN SODIUM AND DEXTROSE 10000; 5 [USP'U]/100ML; G/100ML
INJECTION INTRAVENOUS CONTINUOUS
Status: DISCONTINUED | OUTPATIENT
Start: 2020-11-01 | End: 2020-11-06

## 2020-11-01 RX ORDER — FUROSEMIDE 10 MG/ML
20 INJECTION INTRAMUSCULAR; INTRAVENOUS ONCE
Status: COMPLETED | OUTPATIENT
Start: 2020-11-01 | End: 2020-11-01

## 2020-11-01 RX ORDER — MORPHINE SULFATE 2 MG/ML
1 INJECTION, SOLUTION INTRAMUSCULAR; INTRAVENOUS
Status: DISCONTINUED | OUTPATIENT
Start: 2020-11-01 | End: 2020-11-02

## 2020-11-01 RX ORDER — HEPARIN SODIUM AND DEXTROSE 10000; 5 [USP'U]/100ML; G/100ML
12 INJECTION INTRAVENOUS ONCE
Status: COMPLETED | OUTPATIENT
Start: 2020-11-01 | End: 2020-11-01

## 2020-11-01 NOTE — PHYSICAL THERAPY NOTE
PHYSICAL THERAPY TREATMENT NOTE - INPATIENT    Room Number: 6541/8282-C     Session:   Number of Visits to Meet Established Goals: 6    Presenting Problem: Acute R MCA CVA with hemorrhagic conversion    Problem List  Principal Problem:    Acute right MCA the patient currently have. ..  -   Turning over in bed (including adjusting bedclothes, sheets and blankets)?: A Little   -   Sitting down on and standing up from a chair with arms (e.g., wheelchair, bedside commode, etc.): A Little   -   Moving from lying room.  RN & PCT were made aware that patient can use the RW with them during pivot transfers between bed & chair.       THERAPEUTIC EXERCISES  Lower Extremity Heel slides, hip abd/add, SLR, ankle pumps-in supine  LAQ, hip flex-sitting at the EOB     Upper E

## 2020-11-01 NOTE — PROGRESS NOTES
[id number] [facility] Neurology Progress Note    Moiz Rodríguez Patient Status:  Inpatient    10/9/1940 MRN [id number]   [facility] [place] 7NE-A Attending Vick Costa MD   [facility] Day # 6 PCP NAHUM Farr       Subjective:  Iona Whatley system was reviewed. Pertinent positives and negatives are noted in HPI.       PHYSICAL EXAMINATION:  VITAL SIGNS: /77   Pulse 89   Temp 98.6 °F (37 °C) (Oral)   Resp 22   Wt 137 lb 5.6 oz (62.3 kg)   SpO2 92%   BMI 23.58 kg/m²   GENERAL:  Patient is Tilmon Nestor is no significant midline shift.     CTH 10/26/20:  CONCLUSION:  1.  Again identified is area of hyperdensity within the right caudate lobe, right frontal lobe, and basal ganglia which appears less prominent than on prior examination.  This area hype 10:33 AM      NEUROLOGY - [facility]  11/1/2020   11:05 AM    Patient seen and examined.     Problems:  S/p TPA right MCA  S/p Thrombectomy right  Had hallucinated with Morphine  Developed partial gut obstruction yesterday  Started Hepar

## 2020-11-01 NOTE — PROGRESS NOTES
[facility]  Progress Note     Ciara Rome Patient Status:  Inpatient    10/9/1940 MRN [id number]   [facility] 6NE-A Attending Charlene Drummond MD   Hosp Day # 6 PCP Fidel Gitelman     Chief Complaint: CVA    S: Patient had adams 69*   BILT 0.6 0.7  --   --  1.0   TP 5.6* 6.1*  --   --  6.2*       Estimated Creatinine Clearance: 52.2 mL/min (based on SCr of 0.73 mg/dL).     Recent Labs   Lab 10/26/20  1109 10/27/20  0514   PTP 13.6 14.8*   INR 1.01 1.12*       Recent Labs   Lab 10/2

## 2020-11-01 NOTE — PLAN OF CARE
Assumed care at 0730  A&Ox4  Neuro checks done Q4H  L sided weakness noted  Patient denies hallucinations  Afib on tele HR controlled on cardizem gtt and with IV lopressor  Worked with PT today, was up to chair  IVF d/c'd earlier, IV lasix given x1  Weaned

## 2020-11-01 NOTE — PROGRESS NOTES
[facility]  Progress Note    Aashish Yang Patient Status:  Inpatient    10/9/1940 MRN [id number]   [facility] 7NE-A Attending Royal Teresita MD   [facility] Day # 6 PCP Geovanny Duncan       Assessment and Plan:  Patient Active Problem kg)  10/26/20 : 137 lb 5.6 oz (62.3 kg)      Allergies:    Penicillin G            RASH    Comment:Trouble bleeding.   Statins                 OTHER (SEE COMMENTS)    Comment:Myalgias  Sulfa Antibiotics       RASH  Warfarin                OTHER (SEE COMMENT (PROTONIX) 40 mg in Sodium Chloride (PF) 0.9 % 10 mL IV push, 40 mg, Intravenous, Q12H    •  metoprolol Tartrate (LOPRESSOR) injection 5 mg, 5 mg, Intravenous, Q6H ELLIOTT    •  dextromethorphan-guaiFENesin (ROBITUSSIN-DM)  MG/5ML liquid 5 mL, 5 mL, Per

## 2020-11-01 NOTE — PLAN OF CARE
Assumed care. Patient resting comfortably. Less restless overnight and no reported  hallucinations overnight (per patient). Patient did have bright red blood coming out of GT into canister around 2230. MD notified and heparin infusion stopped.  GI also upda and hot liquids to right side   Outcome: Progressing

## 2020-11-01 NOTE — PROGRESS NOTES
Gastroenterology Follow-Up Note      Kimberly Tomlinson Patient Status:  Inpatient    10/9/1940 MRN [id number]   [facility] 7NE-A Attending Aurea Gutierrez MD   Hosp Day # 6 PCP Noni Escobar     Chief Complaint/Reason for Follow Up: Pre op nursing care complete. Family at bedside.   Bleeding from G-tube  -resolved   -cnt BID PPI  -ok to start anticoagulation as needed  -if bleeding recurs, recommend EGD      BAY Bueno  Gastroenterology/[facility] [address] Gastroenterology

## 2020-11-02 ENCOUNTER — APPOINTMENT (OUTPATIENT)
Dept: GENERAL RADIOLOGY | Facility: HOSPITAL | Age: 80
DRG: 023 | End: 2020-11-02
Attending: INTERNAL MEDICINE
Payer: MEDICARE

## 2020-11-02 PROCEDURE — 99232 SBSQ HOSP IP/OBS MODERATE 35: CPT | Performed by: INTERNAL MEDICINE

## 2020-11-02 PROCEDURE — 99233 SBSQ HOSP IP/OBS HIGH 50: CPT | Performed by: OTHER

## 2020-11-02 PROCEDURE — 99233 SBSQ HOSP IP/OBS HIGH 50: CPT | Performed by: HOSPITALIST

## 2020-11-02 PROCEDURE — 74018 RADEX ABDOMEN 1 VIEW: CPT | Performed by: INTERNAL MEDICINE

## 2020-11-02 RX ORDER — HYDROMORPHONE HYDROCHLORIDE 1 MG/ML
0.2 INJECTION, SOLUTION INTRAMUSCULAR; INTRAVENOUS; SUBCUTANEOUS EVERY 6 HOURS PRN
Status: DISCONTINUED | OUTPATIENT
Start: 2020-11-02 | End: 2020-11-04

## 2020-11-02 RX ORDER — DEXTROSE, SODIUM CHLORIDE, SODIUM LACTATE, POTASSIUM CHLORIDE, AND CALCIUM CHLORIDE 5; .6; .31; .03; .02 G/100ML; G/100ML; G/100ML; G/100ML; G/100ML
INJECTION, SOLUTION INTRAVENOUS CONTINUOUS
Status: DISCONTINUED | OUTPATIENT
Start: 2020-11-02 | End: 2020-11-08

## 2020-11-02 NOTE — PHYSICAL THERAPY NOTE
PHYSICAL THERAPY TREATMENT NOTE - INPATIENT    Room Number: 1864/4994-A     Session: 3  Number of Visits to Meet Established Goals: 6    Presenting Problem: Acute R MCA CVA with hemorrhagic conversion    Problem List  Principal Problem:    Acute right MCA BASIC MOBILITY  How much difficulty does the patient currently have. ..  -   Turning over in bed (including adjusting bedclothes, sheets and blankets)?: A Little   -   Sitting down on and standing up from a chair with arms (e.g., wheelchair, bedside commode of position to prevent bed sores and deconditioning.    Education and training provided on  Transfer training  Gait training  Strength exercises  Balance training    THERAPEUTIC EXERCISES  Lower Extremity Heel slides, hip abd/add, SLR, ankle pumps-in supine

## 2020-11-02 NOTE — CONSULTS
Gastroenterology Progress Note  Abdulkadir Merchant Patient Status:  Inpatient    10/9/1940 MRN [id number]   [facility] 7NE-A Attending Yanira Dinero MD   [facility] Day # 7  [facility] 57* 65* 46*     Imaging:  PROCEDURE:  XR ABDOMEN (1 VIEW) (CPT=74018)     INDICATIONS:  follow ileus     COMPARISON:  [facility] , XR, XR ABDOMEN (1 VIEW) (CPT=74018), 11/01/2020, 7:24 AM.     TECHNIQUE:  Supine AP view was obtained.      PATIENT STATED HISTORY

## 2020-11-02 NOTE — PROGRESS NOTES
[facility]  Cardiology Progress Note    Geoff Valenzuela Patient Status:  Inpatient    10/9/1940 MRN [id number]   [facility] 7NE-A Attending Simeon Bearden MD   Hosp Day # 7 PCP Sanket Sor     Subjective:  C/o productive cough to AM    Attending addendum:    Patient notes that she feels well today. Denies any headaches, dizziness, chest pain, palpitations or shortness of breath. Restarted back on heparin overnight, she denies any further bleeding.   Discussed with patient that in

## 2020-11-02 NOTE — PROGRESS NOTES
[facility]  Progress Note     Luz Marina Figueroa Patient Status:  Inpatient    10/9/1940 MRN [id number]   [facility] 6NE-A Attending Willa More MD   Hosp Day # 7 PCP Gucci Snyder     Chief Complaint: CVA    S: Patient feels o --  109 109  --    CO2 22.0 24.0  --  27.0 25.0  --    ALKPHO 150* 164*  --  139  --   --    AST 57* 65*  --  46*  --   --    ALT 81* 86*  --  69*  --   --    BILT 0.6 0.7  --  1.0  --   --    TP 5.6* 6.1*  --  6.2*  --   --     < > = values in this interv discharge to: rehab    Plan of care discussed with pt and RN    Britt Johnson MD

## 2020-11-02 NOTE — PROGRESS NOTES
Keli Hunt Patient Status:  Inpatient    10/9/1940 MRN [id number]   [facility] [place] [facility] 7NE-A Attending Raquel Villanueva MD   [facility] Day # 7 PCP Robert Roach         Subjective:  Keli Hunt is YJ 28B with PMH AFib (not on [facility]), dysphagia Inhalation, Daily           REVIEW OF SYSTEMS:  A 10-point system was reviewed.   Pertinent positives and negatives are noted in HPI.       PHYSICAL EXAMINATION:  VITAL SIGNS: /77   Pulse 89   Temp 98.6 °F (37 °C) (Oral)   Resp 22   Wt 137 lb 5.6 oz ( effect with partial effacement the right lateral ventricle.  There is no significant midline shift.     CTH 10/26/20:  CONCLUSION:  1.  Again identified is area of hyperdensity within the right caudate lobe, right frontal lobe, and basal ganglia which appea

## 2020-11-02 NOTE — CONSULTS
[facility]  Inpatient Wound Care Contact Note    Bessy Batch Patient Status:  Inpatient    10/9/1940 MRN [id number]   [facility] 7NE-A Attending Ilan Washington MD   Hosp Day # 7  Progress Bassam received, talked to

## 2020-11-02 NOTE — PROGRESS NOTES
Gastroenterology Progress Note  Dayana Abdicheco Patient Status:  Inpatient    10/9/1940 MRN [id number]   [facility] [place] [facility] 7NE-A Attending Don Farmer MD   [facility] Day # 7  [facility] 57* 65* 46*     Imaging:  PROCEDURE:  XR ABDOMEN (1 VIEW) (CPT=74018)     INDICATIONS:  follow ileus     COMPARISON:  [facility] , XR, XR ABDOMEN (1 VIEW) (CPT=74018), 11/01/2020, 7:24 AM.     TECHNIQUE:  Supine AP view was obtained.      PATIENT STATED HISTORY

## 2020-11-02 NOTE — CM/SW NOTE
Care Progression Note:  Active Acute Medical Issue:   Acute right MCA stroke (HCC)-hemorrhagic conversion. TPA w/ cryo reversal. R MCA embolectomy. ASA held. GI bleed- possible EGD. Chronic Gtube leaking. GI following. Ileus- resolving, NGT to LIS.     O

## 2020-11-02 NOTE — PLAN OF CARE
Assumed care at 1900. Alert and oriented x4. Telemetry monitor reading A-fib. Cardizem and Heparin infusing assessed and maintained. Pain controlled with Morphine. G-tube to suction with yellowish/greenish drainage output.  Fall precautions maintained per p

## 2020-11-02 NOTE — PLAN OF CARE
Assumed care @ 0700  Afib on tele, vpaced at times. Heparin and cardizem gtt infusing, rates controlled  L facial droop and weakness noted. Pt denies decreased sensation  Productive cough with thick, yellow sputum  PEG to LIS. No drainage at site noted.  Dina Puckett

## 2020-11-03 ENCOUNTER — APPOINTMENT (OUTPATIENT)
Dept: GENERAL RADIOLOGY | Facility: HOSPITAL | Age: 80
DRG: 023 | End: 2020-11-03
Attending: INTERNAL MEDICINE
Payer: MEDICARE

## 2020-11-03 ENCOUNTER — APPOINTMENT (OUTPATIENT)
Dept: GENERAL RADIOLOGY | Facility: HOSPITAL | Age: 80
DRG: 023 | End: 2020-11-03
Attending: HOSPITALIST
Payer: MEDICARE

## 2020-11-03 ENCOUNTER — APPOINTMENT (OUTPATIENT)
Dept: CT IMAGING | Facility: HOSPITAL | Age: 80
DRG: 023 | End: 2020-11-03
Attending: PHYSICIAN ASSISTANT
Payer: MEDICARE

## 2020-11-03 PROCEDURE — 74019 RADEX ABDOMEN 2 VIEWS: CPT | Performed by: INTERNAL MEDICINE

## 2020-11-03 PROCEDURE — 99232 SBSQ HOSP IP/OBS MODERATE 35: CPT | Performed by: NURSE PRACTITIONER

## 2020-11-03 PROCEDURE — 71046 X-RAY EXAM CHEST 2 VIEWS: CPT | Performed by: HOSPITALIST

## 2020-11-03 PROCEDURE — 70450 CT HEAD/BRAIN W/O DYE: CPT | Performed by: PHYSICIAN ASSISTANT

## 2020-11-03 PROCEDURE — 99233 SBSQ HOSP IP/OBS HIGH 50: CPT | Performed by: HOSPITALIST

## 2020-11-03 RX ORDER — POTASSIUM CHLORIDE 14.9 MG/ML
20 INJECTION INTRAVENOUS ONCE
Status: COMPLETED | OUTPATIENT
Start: 2020-11-03 | End: 2020-11-03

## 2020-11-03 NOTE — PLAN OF CARE
Assumed care at 1900  Neuro checks q4h  +cough, SOB with exertion  Cardizem and heparin gtt  NPO  PEG to LIS  Purewick in place  Seizure precautions maintained  CT of head unchanged per call from radiologist  Plan for KUB this AM  Will continue to monitor

## 2020-11-03 NOTE — PROGRESS NOTES
[facility]  Cardiology Progress Note    Subjective:  No chest pain or shortness of breath.     Objective:  /89 (BP Location: Left arm)   Pulse 96   Temp 98.2 °F (36.8 °C) (Oral)   Resp 14   Wt 137 lb 5.6 oz (62.3 kg)   SpO2 97%   BMI 23.58 kg/m² normal...

## 2020-11-03 NOTE — PROGRESS NOTES
Gastroenterology Progress Note  Raysa Valle Patient Status:  Inpatient    10/9/1940 MRN [id number]   [facility] 7NE-A Attending Audrey Yee MD   [facility] Day # 8 [facility] * 57* 65* 46*     Imaging:  PROCEDURE:  XR ABDOMEN OBSTRUCTIVE SERIES ROUTINE(2 VW)(CPT=74019)     TECHNIQUE:  2 view obstructive series of the abdomen and pelvis were obtained.      COMPARISON:  [facility] , XR, XR ABDOMEN (1 VIEW) (CPT=74018), 11/02/

## 2020-11-03 NOTE — PLAN OF CARE
Pt is alert and oriented x4, very forgetful and having some hallucinations this AM, pt states her  was in the room with her. Abd XR showing some improvement, no flex sig needed at this time per Dr. Steffen Caceres. Contniue G tube to LIS.  Pt up in chair mo

## 2020-11-03 NOTE — DIETARY NOTE
[facility]    NUTRITION ASSESSMENT    Pt does not meet malnutrition criteria.     NUTRITION DIAGNOSIS/PROBLEM:    Inadequate Oral intake related to difficulty swallowing as evidenced by dysphagia with long term GTube feeds    NUTRITION INTERVENTION: : 62.3 kg (137 lb 5.6 oz)  10/26/20 : 62.3 kg (137 lb 5.6 oz)      NUTRITION:  Diet: NPO  Oral Supplements: n/a    FOOD/NUTRITION RELATED HISTORY:  Appetite:   Intake: 0 - TF on hold  Intake Meeting Needs: No  Food Allergies: No  Cultural/Ethnic/Roman Catholic

## 2020-11-03 NOTE — PROGRESS NOTES
[facility]  Progress Note     Daysideepak Lo Patient Status:  Inpatient    10/9/1940 MRN [id number]   [facility] 6NE-A Attending Cristy Acevedo MD   Hosp Day # 8 PCP Cyndee Ruiz     Chief Complaint: CVA    S: Patient has no 22.0 24.0  --  27.0 25.0  --  26.0   ALKPHO 150* 164*  --  139  --   --   --    AST 57* 65*  --  46*  --   --   --    ALT 81* 86*  --  69*  --   --   --    BILT 0.6 0.7  --  1.0  --   --   --    TP 5.6* 6.1*  --  6.2*  --   --   --     < > = values in this PRN    Quality:  · DVT Prophylaxis: heparin drip  · CODE status: full    Will the patient be referred to TCC on discharge?: no  Estimated date of discharge: TBD  Discharge is dependent on: course  At this point Ms. Uday Julio is expected to be discharge to:

## 2020-11-04 ENCOUNTER — APPOINTMENT (OUTPATIENT)
Dept: GENERAL RADIOLOGY | Facility: HOSPITAL | Age: 80
DRG: 023 | End: 2020-11-04
Attending: INTERNAL MEDICINE
Payer: MEDICARE

## 2020-11-04 ENCOUNTER — APPOINTMENT (OUTPATIENT)
Dept: GENERAL RADIOLOGY | Facility: HOSPITAL | Age: 80
DRG: 023 | End: 2020-11-04
Attending: NURSE PRACTITIONER
Payer: MEDICARE

## 2020-11-04 PROCEDURE — 99232 SBSQ HOSP IP/OBS MODERATE 35: CPT | Performed by: INTERNAL MEDICINE

## 2020-11-04 PROCEDURE — 74019 RADEX ABDOMEN 2 VIEWS: CPT | Performed by: NURSE PRACTITIONER

## 2020-11-04 PROCEDURE — 71045 X-RAY EXAM CHEST 1 VIEW: CPT | Performed by: INTERNAL MEDICINE

## 2020-11-04 RX ORDER — FUROSEMIDE 10 MG/ML
40 INJECTION INTRAMUSCULAR; INTRAVENOUS ONCE
Status: COMPLETED | OUTPATIENT
Start: 2020-11-04 | End: 2020-11-04

## 2020-11-04 RX ORDER — TRAMADOL HYDROCHLORIDE 50 MG/1
50 TABLET ORAL EVERY 6 HOURS PRN
Status: DISCONTINUED | OUTPATIENT
Start: 2020-11-04 | End: 2020-11-08

## 2020-11-04 RX ORDER — POTASSIUM CHLORIDE 14.9 MG/ML
20 INJECTION INTRAVENOUS ONCE
Status: COMPLETED | OUTPATIENT
Start: 2020-11-04 | End: 2020-11-04

## 2020-11-04 NOTE — CARDIAC REHAB
Attempted to see pt for stroke education but deferred per RN as  is not present. Will continue to follow.

## 2020-11-04 NOTE — PROGRESS NOTES
[facility]  Cardiology Progress Note    Subjective:  No chest pain or shortness of breath.     Objective:  /78 (BP Location: Left arm)   Pulse 98   Temp 97.7 °F (36.5 °C) (Oral)   Resp 22   Wt 137 lb 5.6 oz (62.3 kg)   SpO2 95%   BMI 23.58 kg/m² thrombectomy 10/26  - Permanent Afib - not on AC in the past due to bleeding issues  - Ileus  - Chronic dysphagia s/p G tube  - SSS s/p PPM  - COPD    P:  - Post CVA management as per Neurology  - Remains in Afib - rates controlled  - Cont Cardizem gtt and

## 2020-11-04 NOTE — PLAN OF CARE
Received pt at 1930  Pt vpaced on tele, RA  Pain managed with dilaudid x1  Heparin infusing at 8ml/hr  Cardizem infusing at 10ml/hr  Needs met will continue to monitor        Problem: HEMATOLOGIC - ADULT  Goal: Free from bleeding injury  Description: (Exam

## 2020-11-04 NOTE — PROGRESS NOTES
[facility]  Progress Note     Cooper Ashford Patient Status:  Inpatient    10/9/1940 MRN [id number]   [facility] 6NE-A Attending Georgi 91 Day # 9 PCP Lauren Garcia     Chief Complaint: CVA    S: Patient reports cou 142 144  --  141 141  --  142  --  142   K 3.2* 3.4*   < > 3.8 3.5   < > 3.1* 3.6 3.2*   * 113*  --  109 109  --  110  --  111   CO2 22.0 24.0  --  27.0 25.0  --  26.0  --  25.0   ALKPHO 150* 164*  --  139  --   --   --   --   --    AST 57* 65*  -- further upsizing; tube secured    Quality:  · DVT Prophylaxis: heparin drip  · CODE status: full    Will the patient be referred to TCC on discharge?: no  Estimated date of discharge: in next 24hrs if back on TF and not events, Acute rehab [address] [place] [address]

## 2020-11-04 NOTE — PLAN OF CARE
Pt is alert and oriented x4, confused and forgetful at times. Pt hallucinated, claiming she sees  and other people in room.  Attempted to educate pt on not using narcotics d/t hallucinations, pt states tylenol doesn't help with her pain and she needs

## 2020-11-04 NOTE — PROGRESS NOTES
Gastroenterology Progress Note  Sinda Frame Patient Status:  Inpatient    10/9/1940 MRN [id number]   [facility] 7NE-A Attending Oriana Salinas, [address] [facility] Day # 9 PCP Aimee Orozco Ch 10/29/20  0604 10/29/20  1016 10/31/20  1136 11/04/20  0448   ALT 81* 86* 69* 35   AST 57* 65* 46* 33     Imaging:  PROCEDURE:  XR ABDOMEN OBSTRUCTIVE SERIES ROUTINE(2 VW)(CPT=74019)     TECHNIQUE:  2 view obstructive series of the abdomen and pelvis were Tammie Magdaleno  12:17 PM  11/4/2020  [facility]  [phone]

## 2020-11-05 ENCOUNTER — APPOINTMENT (OUTPATIENT)
Dept: GENERAL RADIOLOGY | Facility: HOSPITAL | Age: 80
DRG: 023 | End: 2020-11-05
Attending: NURSE PRACTITIONER
Payer: MEDICARE

## 2020-11-05 PROCEDURE — 99232 SBSQ HOSP IP/OBS MODERATE 35: CPT | Performed by: INTERNAL MEDICINE

## 2020-11-05 PROCEDURE — 74019 RADEX ABDOMEN 2 VIEWS: CPT | Performed by: NURSE PRACTITIONER

## 2020-11-05 NOTE — PROGRESS NOTES
Gastroenterology Progress Note  Jodysasha Mcmahon Patient Status:  Inpatient    10/9/1940 MRN [id number]   [facility] 7NE-A Attending Alejandra Pearson, 1604 [facility] Day # 10 [facility] 100 [facility] series of the abdomen and pelvis were obtained.      COMPARISON:  [facility]WARD , XR, XR ABDOMEN OBSTRUCTIVE SERIES ROUTINE(2 VW)(CPT=74019), 11/04/2020, 9:40 AM.  [facility]WARD , XR, XR ABDOMEN OBSTRUCTIVE SERIES ROUTINE(2 VW)(CPT=74019), 11/03/2020, 8:53 AM.     [facility]  [phone]

## 2020-11-05 NOTE — DIETARY NOTE
[facility]    NUTRITION ASSESSMENT    Pt does not meet malnutrition criteria.     NUTRITION DIAGNOSIS/PROBLEM:    Inadequate Oral intake related to difficulty swallowing as evidenced by dysphagia with long term GTube feeds    NUTRITION INTERVENTION: 62.3 kg (137 lb 5.6 oz). This is 114 % of IBW  BMI: Body mass index is 23.58 kg/m².   IBW: 54.5 kg    WEIGHT HISTORY:   Wt Readings from Last 12 Encounters:  10/27/20 : 62.3 kg (137 lb 5.6 oz)  10/26/20 : 62.3 kg (137 lb 5.6 oz)      NUTRITION:  Diet: NPO

## 2020-11-05 NOTE — PROGRESS NOTES
[facility]  Cardiology Progress Note    Subjective:  No chest pain or shortness of breath.     Objective:  BP (!) 142/113 (BP Location: Left arm)   Pulse 92   Temp 97.8 °F (36.6 °C) (Oral)   Resp 18   Wt 137 lb 5.6 oz (62.3 kg)   SpO2 94%   BMI 23.58 k another attempt especially in light of her acute CVA. - Hgb remains stable  - Clinically fluid status appears improved - will hold diuretics for now.  Can consider starting low dose oral lasix when patient able to take PO.   - Management of her Ileus as pe

## 2020-11-05 NOTE — PHYSICAL THERAPY NOTE
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 ..  -   Turning over in bed (including adjusting bedclothes, sheets and blankets)?: A Little   -   Sitting down on and standing up from a chair with arms (e.g., wheelchair, bedside commode, etc.): A Little   -   M worker    ASSESSMENT   Patient continues to demonstrate deficits in strength, balance, endurance, mobility and functional independence, and will benefit from continued skilled PT. Continue to rec acute rehab at NH.         DISCHARGE RECOMMENDATIONS  PT Dis

## 2020-11-05 NOTE — OCCUPATIONAL THERAPY NOTE
Att'd to see pt this PM. RN cleared pt to be seen. Pt received sleeping in bed with spouse present. Pt spouse requested that therapy re-attempt in the AM. OT will continue to follow and re-attempt at later date.

## 2020-11-05 NOTE — PLAN OF CARE
Assumed care at 0700. No acute issues. A.o x 4, forgetful and confused at times. Abdominal xray completed. Gtube now clamped. Will resume TF in am if patient tolerates night. Denies nausea, no vomiting. BM this am.   Up to chair, ambulated in hallway.

## 2020-11-05 NOTE — PROGRESS NOTES
[facility]  Progress Note     Heriberto Patricioalicia Patient Status:  Inpatient    10/9/1940 MRN [id number]   [facility] 6NE-A Attending Georgi 91 Day # 10 PCP Lobo Downs     Chief Complaint: CVA    S: Patient reports sh < > 3.1*   < > 3.2* 3.5 3.8    109  --  110  --  111  --   --    CO2 27.0 25.0  --  26.0  --  25.0  --   --    ALKPHO 139  --   --   --   --  118  --   --    AST 46*  --   --   --   --  33  --   --    ALT 69*  --   --   --   --  35  --   --    BILT 1 full    Will the patient be referred to TCC on discharge?: no  Estimated date of discharge: in next 24hrs if back on TF and not events, Acute rehab [address] discussed with pt and RN, MD. Increase activity. F/u on imaging.   hopefully start TF tod

## 2020-11-05 NOTE — PLAN OF CARE
Received pt at [facility] on tele, RA  G tube to suction  Heparin infusing at 8ml/hr  Cardizem infusing at 10ml/hr  Pt continues to have hallucinations  Needs met will continue to montior        Problem: NEUROLOGICAL - ADULT  Goal: Achieves stable or im

## 2020-11-06 ENCOUNTER — APPOINTMENT (OUTPATIENT)
Dept: GENERAL RADIOLOGY | Facility: HOSPITAL | Age: 80
DRG: 023 | End: 2020-11-06
Attending: NURSE PRACTITIONER
Payer: MEDICARE

## 2020-11-06 PROCEDURE — 99232 SBSQ HOSP IP/OBS MODERATE 35: CPT | Performed by: INTERNAL MEDICINE

## 2020-11-06 PROCEDURE — 71045 X-RAY EXAM CHEST 1 VIEW: CPT | Performed by: NURSE PRACTITIONER

## 2020-11-06 RX ORDER — FUROSEMIDE 40 MG/1
40 TABLET ORAL DAILY
Status: DISCONTINUED | OUTPATIENT
Start: 2020-11-06 | End: 2020-11-08

## 2020-11-06 RX ORDER — DILTIAZEM HYDROCHLORIDE 60 MG/1
60 TABLET, FILM COATED ORAL 2 TIMES DAILY
Status: DISCONTINUED | OUTPATIENT
Start: 2020-11-06 | End: 2020-11-07

## 2020-11-06 RX ORDER — FUROSEMIDE 10 MG/ML
20 INJECTION INTRAMUSCULAR; INTRAVENOUS ONCE
Status: COMPLETED | OUTPATIENT
Start: 2020-11-06 | End: 2020-11-06

## 2020-11-06 NOTE — PHYSICAL THERAPY NOTE
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 ..  -   Turning over in bed (including adjusting bedclothes, sheets and blankets)?: A Little   -   Sitting down on and standing up from a chair with arms (e.g., wheelchair, bedside commo independence, and will benefit from continued skilled PT. Continue to rec acute rehab at [facility]. Will continue to follow up for further session.         DISCHARGE RECOMMENDATIONS  PT Discharge Recommendations: Acute rehabilitation     PLAN  PT Treatment Plan:

## 2020-11-06 NOTE — PROGRESS NOTES
[facility]  Cardiology Progress Note    Subjective:  No chest pain or shortness of breath.     Objective:  /76 (BP Location: Left arm)   Pulse 101   Temp 97.6 °F (36.4 °C) (Oral)   Resp 25   Wt 137 lb 5.6 oz (62.3 kg)   SpO2 95%   BMI 23.58 kg/m² issues  - Ileus  - Chronic dysphagia s/p G tube  - SSS s/p PPM  - COPD     P:  - Post CVA management as per Neurology  - Remains in Afib - rates controlled  - Cont Cardizem and metoprolol   - CHADS2-VASC score 5 - patient ok to take Pills thus will switch

## 2020-11-06 NOTE — SLP NOTE
SPEECH DAILY NOTE - INPATIENT    ASSESSMENT & PLAN   ASSESSMENT  Patient seen for cognitive-communication therapy this date as per POC.  Targeted visual processing of paragraph level information via yes/no and multiple choice questions in which patient was

## 2020-11-06 NOTE — PLAN OF CARE
Received pt at [facility] on tele, RA  No nausea, or vomiting over night  Cardizem infusing at 10ml/hr  Heparin infusing at 8ml/hr  Pt c/o HA IV tylenol given  Needs met will continue to monitor

## 2020-11-06 NOTE — PLAN OF CARE
Problem: Impaired Activities of Daily Living  Goal: Achieve highest/safest level of independence in self care  Description: Interventions:  - Assess ability and encourage patient to participate in ADLs to maximize function  - Promote sitting position [facility]

## 2020-11-06 NOTE — PROGRESS NOTES
[facility]  Progress Note     Elijah Ranks Patient Status:  Inpatient    10/9/1940 MRN [id number]   [facility] 6NE-A Attending Georgi 91 Day # 6 PCP Carlos Blanton     Chief Complaint: CVA    S: Patient currently 110  --  111  --   --    CO2 27.0 25.0  --  26.0  --  25.0  --   --    ALKPHO 139  --   --   --   --  118  --   --    AST 46*  --   --   --   --  33  --   --    ALT 69*  --   --   --   --  35  --   --    BILT 1.0  --   --   --   --  1.0  --   --    TP 6. 2 against further upsizing; tube secured    Quality:  · DVT Prophylaxis: heparin drip  · CODE status: full    Will the patient be referred to TCC on discharge?: no  Estimated date of discharge: in next 24hrs if back on TF and not events, Acute rehab ABDULAZIZ    Pl

## 2020-11-06 NOTE — OCCUPATIONAL THERAPY NOTE
OCCUPATIONAL THERAPY TREATMENT NOTE - INPATIENT     Room Number: 4912/3897-U  Session: 1  Number of Visits to Meet Established Goals: 5    Presenting Problem: (acute R MCA CVA)      History related to current admission:   Admitted from home 10/26/20 with a ACTIVITY TOLERANCE                         O2 SATURATIONS                ACTIVITIES OF DAILY LIVING ASSESSMENT  AM-PAC ‘6-Clicks’ Inpatient Daily Activity Short Form  How much help from another person does the patient currently need…  -   Putting on an Device Recommendations: TBD    PLAN  OT Treatment Plan: Balance activities; Energy conservation/work simplification techniques;ADL training;Visual perceptual training;Functional transfer training;UE strengthening/ROM; Endurance training;Cognitive reorientati

## 2020-11-06 NOTE — CM/SW NOTE
Care Progression Note:  Active Acute Medical Issue:   Acute right MCA stroke (HCC)-hemorrhagic conversion. TPA w/ cryo reversal. R MCA embolectomy. Cardizem & heparin gtts  GI bleed- no Gtube leaking noted. GI following. Ileus- resolving. +BM.  TF at goal.

## 2020-11-07 PROCEDURE — 99232 SBSQ HOSP IP/OBS MODERATE 35: CPT | Performed by: INTERNAL MEDICINE

## 2020-11-07 RX ORDER — METOCLOPRAMIDE HYDROCHLORIDE 5 MG/ML
10 INJECTION INTRAMUSCULAR; INTRAVENOUS ONCE
Status: COMPLETED | OUTPATIENT
Start: 2020-11-07 | End: 2020-11-07

## 2020-11-07 RX ORDER — DILTIAZEM HYDROCHLORIDE 60 MG/1
60 TABLET, FILM COATED ORAL EVERY 6 HOURS SCHEDULED
Status: DISCONTINUED | OUTPATIENT
Start: 2020-11-07 | End: 2020-11-08

## 2020-11-07 NOTE — PROGRESS NOTES
[facility]  Cardiology Progress Note    Jesenia Car Patient Status:  Inpatient    10/9/1940 MRN [id number]   [facility] 7NE-A Attending Lilo Aguila, DO   Hosp Day # 12 PCP NAHUM [place]     Subjective:  Patient had a bowel mov fibrillation, not on AC due to bleeding issues. On oral BB , diltiazem and eliquis. · Chronic dysphagia, G tube  · COPD  · Dual chamber PPM    Plan:   1. Continue CCB, BB, eliquis  2. Cardiology will sign off, please call with questions or concerns.

## 2020-11-07 NOTE — PROGRESS NOTES
[facility]  Progress Note     Zena Iglesias Patient Status:  Inpatient    10/9/1940 MRN [id number]   [facility] 6NE-A Attending Georgi 91 Day # 15 PCP Thompson Wells     Chief Complaint: CVA    S: Patient's tube fee 3.0*  --   --   --  3.0*  --   --   --       < > 142  --  142  --   --  143   K 3.8   < > 3.1*   < > 3.2* 3.5 3.8 3.4*      < > 110  --  111  --   --  111   CO2 27.0   < > 26.0  --  25.0  --   --  26.0   ALKPHO 139  --   --   --  118  --   -- stopped dilaudid  8. Transaminase elevation, resolved  9. Hypokalemia resolved  10. COPD, stable  1. Maintain home inhalers  11. Chronic dysphagia s/p Gtube with chronic G tube leak  1.  On TF as OP, currently on hold due to #1  2. GI saw and rec against fu

## 2020-11-07 NOTE — PLAN OF CARE
Assumed care at 0730  Patient drowsy at times throughout day  No acute neurological changes  L side slightly weaker than R  Tramadol given for pain  GI re-consulted for high TF residual overnight.  Per GI, re-start TF and monitor for vomiting or severe naus

## 2020-11-07 NOTE — PLAN OF CARE
Assumed care at 0700. No acute issues. TF resumed this am, tolerating well. Eliquis started, hep gtt off. Cardizem thru peg. Worked with PT. Still occasionally hallucinates/says inappropriate things. Should dc to [facility] tomorrow.

## 2020-11-07 NOTE — PLAN OF CARE
Received pt at [facility] on tele, RA  Tube feedings held due to high residual from gtube, pt denies pain/discomfort related to the abdomen  IV tylenol given for pain  Needs met will continue to montior        Problem: Impaired Functional Mobility  Goal

## 2020-11-08 VITALS
TEMPERATURE: 98 F | SYSTOLIC BLOOD PRESSURE: 111 MMHG | OXYGEN SATURATION: 96 % | DIASTOLIC BLOOD PRESSURE: 58 MMHG | HEART RATE: 89 BPM | WEIGHT: 137.38 LBS | BODY MASS INDEX: 24 KG/M2 | RESPIRATION RATE: 18 BRPM

## 2020-11-08 PROCEDURE — 99239 HOSP IP/OBS DSCHRG MGMT >30: CPT | Performed by: INTERNAL MEDICINE

## 2020-11-08 RX ORDER — POTASSIUM CHLORIDE 14.9 MG/ML
20 INJECTION INTRAVENOUS ONCE
Status: COMPLETED | OUTPATIENT
Start: 2020-11-08 | End: 2020-11-08

## 2020-11-08 RX ORDER — FUROSEMIDE 40 MG/1
40 TABLET ORAL DAILY
Qty: 30 TABLET | Refills: 0 | Status: SHIPPED | OUTPATIENT
Start: 2020-11-08 | End: 2021-01-21 | Stop reason: ALTCHOICE

## 2020-11-08 RX ORDER — DILTIAZEM HYDROCHLORIDE 60 MG/1
60 TABLET, FILM COATED ORAL EVERY 6 HOURS SCHEDULED
Qty: 120 TABLET | Refills: 0 | Status: SHIPPED | OUTPATIENT
Start: 2020-11-08 | End: 2021-04-19 | Stop reason: CLARIF

## 2020-11-08 NOTE — PROGRESS NOTES
SUBJECTIVE:  Patient with ongoing decreased coordination, decreased balance weakness and swallowing impairment. CC: Unsteady gait  Interval History: Patient is status post right cerebral artery thrombectomy on October 26.   She been treated for atrial fibr limbs; she seems a bit weaker left arm and leg compared to right with significant discoordination left arm and leg compared to the right. NEURO: Cranial Nerves 2-12 intact; normal sensory light touch, proprioception; DTR normal x 4 limbs.       Data Review

## 2020-11-08 NOTE — PLAN OF CARE
Assumed care at 0730  Patient alert, oriented x4  TF infusing. Denies nausea. No vomiting  Tramadol given for generalized pain  Patient cleared for d/c to [facility]  Report called to RN    NURSING DISCHARGE NOTE    Discharged Rehab facility via Ambulance.

## 2020-11-08 NOTE — CM/SW NOTE
CALISTA spoke with Nash Soriano from [facility] # [phone]- acute rehab to discuss discharge today.    Discharge pending rapid COVID test.    Silvano Hidalgo RN, BSN   [phone]

## 2020-11-08 NOTE — CM/SW NOTE
Pt will transfer to [address] [place] [address] at 4 pm via ambulance. Please call report to #[phone]. Patient is assigned to bed #1184 - Dr Issac Rodarte.        Tara Juarez RN, BSN   [phone]

## 2020-11-08 NOTE — PROGRESS NOTES
Gastroenterology Progress Note  Karen Ureña Patient Status:  Inpatient    10/9/1940 MRN [id number]   [facility] 7NE-A Attending Kathy Cottrell, 160[address] [facility] Day # 13 PCP Ervin Bo EDWARD , XR, XR ABDOMEN OBSTRUCTIVE SERIES ROUTINE(2 VW)(CPT=74019), 11/04/2020, 9:40 AM.  [facility] , XR, XR ABDOMEN OBSTRUCTIVE SERIES ROUTINE(2 VW)(CPT=74019), 11/03/2020, 8:53 AM.     INDICATIONS:  ileus; abd distention     PATIENT STATED HISTORY: (As tra

## 2020-11-08 NOTE — PLAN OF CARE
Assumed care of pt.  At 1930  A&O x 4  Q4 neuro checks  Left sided weakness; left facial droop  Room Air- lungs diminished  Chronic productive cough  Afib on telemetry  Abdomen soft round nontender  G tube- Tf continuous- tolerating this evening with no carlos home; allow for food preferences  - Enhance eating environment  Outcome: Progressing     Problem: NEUROLOGICAL - ADULT  Goal: Achieves stable or improved neurological status  Description: INTERVENTIONS  - Assess for and report changes in neurological statu

## 2020-11-08 NOTE — DISCHARGE SUMMARY
[facility] HOSPITAL[facility]  DISCHARGE SUMMARY     Shawn Schmidt Patient Status:  Inpatient    10/9/1940 MRN [id number]   [facility] 7NE-A Attending Boyd Benjamin, [address] [facility] Day # 13 PCP Cain Chappell     Date of Admission: 10/26/2020  Date of switched over to Cardizem and metoprolol p.o. Her heart rates are currently under control. She was maintained on her home inhalers for her COPD.      Lace+ Score: 59  59-90 High Risk  29-58 Medium Risk  0-28   Low Risk        TCM Follow-Up Recommendation: mouth every 6 (six) hours as needed for Pain.    Refills: 0           Where to Get Your Medications      These medications were sent to [place] [address], [phone], [address] extremities.   Extremities: No edema  -----------------------------------------------------------------------------------------------  PATIENT DISCHARGE INSTRUCTIONS: See electronic chart    Marissa Dennis DO  Hospitalist  11/8/2020    Time spent:  > 30 gianna

## 2020-12-09 ENCOUNTER — OFFICE VISIT (OUTPATIENT)
Dept: NEUROLOGY | Facility: CLINIC | Age: 80
End: 2020-12-09
Payer: MEDICARE

## 2020-12-09 VITALS
BODY MASS INDEX: 21 KG/M2 | WEIGHT: 125 LBS | DIASTOLIC BLOOD PRESSURE: 58 MMHG | HEART RATE: 78 BPM | RESPIRATION RATE: 16 BRPM | SYSTOLIC BLOOD PRESSURE: 92 MMHG

## 2020-12-09 DIAGNOSIS — I63.311 CEREBROVASCULAR ACCIDENT (CVA) DUE TO THROMBOSIS OF RIGHT MIDDLE CEREBRAL ARTERY (HCC): Primary | ICD-10-CM

## 2020-12-09 PROCEDURE — 99213 OFFICE O/P EST LOW 20 MIN: CPT | Performed by: OTHER

## 2020-12-09 RX ORDER — GEMFIBROZIL 600 MG/1
300 TABLET, FILM COATED ORAL 2 TIMES DAILY
COMMUNITY
Start: 2021-02-02

## 2020-12-09 RX ORDER — SODIUM CHLORIDE FOR INHALATION 7 %
VIAL, NEBULIZER (ML) INHALATION
COMMUNITY
Start: 2020-12-01 | End: 2021-04-19 | Stop reason: CLARIF

## 2020-12-09 RX ORDER — ONDANSETRON HYDROCHLORIDE 4 MG/5ML
4 SOLUTION ORAL
COMMUNITY
Start: 2020-11-25 | End: 2021-04-19 | Stop reason: CLARIF

## 2020-12-09 NOTE — PROGRESS NOTES
Neurology H&P    Cecy Perera Patient Status:  No patient class for patient encounter    10/9/1940 MRN [id number]   Location [address] [place] [address], [address] [place] [address] Drive, [address] [place] [address] Attending No att. providers found   Hosp Day # 0 PCP Smooth Cortes 50 mg by mouth every 6 (six) hours as needed for Pain.      • sodium chloride 7 % Inhalation Nebu Soln USE 2ML VIA NEBULIZER BID         Problem List:  Patient Active Problem List:     Acute right MCA stroke ([facility] [address]     Hematoma of scalp     Acute encephalopa NERVES II to XII: PERRLA, no ptosis or diplopia, EOM intact, facial sensation intact, strong eye closure, mild L NLF flattening, no dysarthria, tongue midline,  no tongue fasciculations or atrophy, strong shoulder shrug.     MOTOR EXAMINATION: normal tone, MCA stroke s/op mechanical thrombectomy in 11/2020. She can continue Apixaban for secondary stroke prevention and Afib. Was on Ezetimibe for cholesterol control. Will need to continue to follow up with cardiology and PCP.  Has an allergy to statins so not o

## 2021-01-01 ENCOUNTER — EXTERNAL RECORD (OUTPATIENT)
Dept: OTHER | Age: 81
End: 2021-01-01

## 2021-01-04 ENCOUNTER — OFFICE VISIT (OUTPATIENT)
Dept: SURGERY | Facility: CLINIC | Age: 81
End: 2021-01-04
Payer: MEDICARE

## 2021-01-04 VITALS — SYSTOLIC BLOOD PRESSURE: 100 MMHG | DIASTOLIC BLOOD PRESSURE: 72 MMHG

## 2021-01-04 DIAGNOSIS — I63.511 ACUTE RIGHT MCA STROKE (HCC): Primary | ICD-10-CM

## 2021-01-04 PROBLEM — F43.22 ADJUSTMENT REACTION WITH ANXIETY: Status: ACTIVE | Noted: 2020-11-09

## 2021-01-04 PROBLEM — E78.2 MIXED HYPERLIPIDEMIA: Status: ACTIVE | Noted: 2021-01-04

## 2021-01-04 PROBLEM — J47.9 BRONCHIECTASIS WITHOUT COMPLICATION (HCC): Status: ACTIVE | Noted: 2017-11-21

## 2021-01-04 PROBLEM — R11.10 POST-TUSSIVE EMESIS: Status: ACTIVE | Noted: 2020-02-06

## 2021-01-04 PROCEDURE — 1111F DSCHRG MED/CURRENT MED MERGE: CPT | Performed by: RADIOLOGY

## 2021-01-04 PROCEDURE — 99213 OFFICE O/P EST LOW 20 MIN: CPT | Performed by: RADIOLOGY

## 2021-01-04 RX ORDER — ONDANSETRON 4 MG/1
4 TABLET, FILM COATED ORAL EVERY 4 HOURS PRN
COMMUNITY
Start: 2020-12-14 | End: 2021-02-08

## 2021-01-04 RX ORDER — ACETAMINOPHEN AND CODEINE PHOSPHATE 120; 12 MG/5ML; MG/5ML
SOLUTION ORAL 3 TIMES DAILY PRN
Status: ON HOLD | COMMUNITY
Start: 2021-03-05 | End: 2021-09-03

## 2021-01-04 RX ORDER — NYSTATIN 100000 U/G
1 CREAM TOPICAL 2 TIMES DAILY
COMMUNITY
Start: 2020-12-17

## 2021-01-04 NOTE — PROGRESS NOTES
Patient had CVA on 10-. She has a chronic J-peg. She had trouble swallowing post stroke. She is allowed to drink thin liquids. She was at [facility] after her stroke. Still feeling weak, needs a lot of help at home.     Review of Systems:    Hand Do

## 2021-01-04 NOTE — PROGRESS NOTES
1/4/2021      Dear Will Michaels,  I had the pleasure of seeing Liz Smoker today,1/4/2021   , for a 2.5 mo follow-up after emergency stroke embolectomy.   As you well remember, Liz Smoker is a 79year old year old  female with history of Afib shortness of breath and COPD  Eyes: no symptoms reported  Skin: J-peg   Mouth & throat: excessive dry mouth and difficulty swallowing  Neck: pain and stiffness  Nose: no symptoms reported post nasal drip  Psychiatric: sadness        Barthel =50  NIH=0 Tartrate 25 MG Oral Tab Take 1 tablet (25 mg total) by mouth 2x Daily(Beta Blocker). (Patient not taking: Reported on 1/4/2021 ) 60 tablet 0      Penicillin G, Statins, Sulfa Antibiotics, and Warfarin     History reviewed. No pertinent family history. we expect her to continue to have improvement with therapy. She has physical, occupational and speech therapy at home as well as a home nurse who is visiting her.     2.  She is scheduled to see her gastroenterologist this week where I have asked her to [facility]

## 2021-01-10 ENCOUNTER — APPOINTMENT (OUTPATIENT)
Dept: CT IMAGING | Facility: HOSPITAL | Age: 81
DRG: 445 | End: 2021-01-10
Attending: EMERGENCY MEDICINE
Payer: MEDICARE

## 2021-01-10 ENCOUNTER — HOSPITAL ENCOUNTER (INPATIENT)
Facility: HOSPITAL | Age: 81
LOS: 6 days | Discharge: HOME HEALTH | DRG: 445 | End: 2021-01-17
Attending: EMERGENCY MEDICINE | Admitting: HOSPITALIST
Payer: MEDICARE

## 2021-01-10 DIAGNOSIS — K85.90 ACUTE PANCREATITIS WITHOUT INFECTION OR NECROSIS, UNSPECIFIED PANCREATITIS TYPE: ICD-10-CM

## 2021-01-10 DIAGNOSIS — K83.9 BILE DUCT ABNORMALITY: ICD-10-CM

## 2021-01-10 DIAGNOSIS — R79.89 ELEVATED LFTS: ICD-10-CM

## 2021-01-10 DIAGNOSIS — K22.2 ESOPHAGEAL STRICTURE: ICD-10-CM

## 2021-01-10 DIAGNOSIS — K94.20 GASTROSTOMY COMPLICATION (HCC): Primary | ICD-10-CM

## 2021-01-10 DIAGNOSIS — R10.10 UPPER ABDOMINAL PAIN: ICD-10-CM

## 2021-01-10 DIAGNOSIS — K83.8 DILATED CBD, ACQUIRED: ICD-10-CM

## 2021-01-10 LAB
ALBUMIN SERPL-MCNC: 3.3 G/DL (ref 3.4–5)
ALBUMIN/GLOB SERPL: 0.9 {RATIO} (ref 1–2)
ALP LIVER SERPL-CCNC: 375 U/L
ALT SERPL-CCNC: 120 U/L
ANION GAP SERPL CALC-SCNC: 7 MMOL/L (ref 0–18)
AST SERPL-CCNC: 154 U/L (ref 15–37)
BASOPHILS # BLD AUTO: 0.07 X10(3) UL (ref 0–0.2)
BASOPHILS NFR BLD AUTO: 0.7 %
BILIRUB SERPL-MCNC: 1.1 MG/DL (ref 0.1–2)
BUN BLD-MCNC: 29 MG/DL (ref 7–18)
BUN/CREAT SERPL: 25.2 (ref 10–20)
CALCIUM BLD-MCNC: 9.9 MG/DL (ref 8.5–10.1)
CHLORIDE SERPL-SCNC: 108 MMOL/L (ref 98–112)
CO2 SERPL-SCNC: 24 MMOL/L (ref 21–32)
CREAT BLD-MCNC: 1.15 MG/DL
DEPRECATED RDW RBC AUTO: 44.4 FL (ref 35.1–46.3)
EOSINOPHIL # BLD AUTO: 0.27 X10(3) UL (ref 0–0.7)
EOSINOPHIL NFR BLD AUTO: 2.9 %
ERYTHROCYTE [DISTWIDTH] IN BLOOD BY AUTOMATED COUNT: 12.4 % (ref 11–15)
GLOBULIN PLAS-MCNC: 3.6 G/DL (ref 2.8–4.4)
GLUCOSE BLD-MCNC: 80 MG/DL (ref 70–99)
HCT VFR BLD AUTO: 48.9 %
HGB BLD-MCNC: 15.5 G/DL
IMM GRANULOCYTES # BLD AUTO: 0.03 X10(3) UL (ref 0–1)
IMM GRANULOCYTES NFR BLD: 0.3 %
LIPASE SERPL-CCNC: 1503 U/L (ref 73–393)
LYMPHOCYTES # BLD AUTO: 1.94 X10(3) UL (ref 1–4)
LYMPHOCYTES NFR BLD AUTO: 20.6 %
M PROTEIN MFR SERPL ELPH: 6.9 G/DL (ref 6.4–8.2)
MCH RBC QN AUTO: 30.4 PG (ref 26–34)
MCHC RBC AUTO-ENTMCNC: 31.7 G/DL (ref 31–37)
MCV RBC AUTO: 95.9 FL
MONOCYTES # BLD AUTO: 0.89 X10(3) UL (ref 0.1–1)
MONOCYTES NFR BLD AUTO: 9.5 %
NEUTROPHILS # BLD AUTO: 6.21 X10 (3) UL (ref 1.5–7.7)
NEUTROPHILS # BLD AUTO: 6.21 X10(3) UL (ref 1.5–7.7)
NEUTROPHILS NFR BLD AUTO: 66 %
OSMOLALITY SERPL CALC.SUM OF ELEC: 293 MOSM/KG (ref 275–295)
PLATELET # BLD AUTO: 242 10(3)UL (ref 150–450)
POTASSIUM SERPL-SCNC: 4.5 MMOL/L (ref 3.5–5.1)
RBC # BLD AUTO: 5.1 X10(6)UL
SARS-COV-2 RNA RESP QL NAA+PROBE: NOT DETECTED
SODIUM SERPL-SCNC: 139 MMOL/L (ref 136–145)
WBC # BLD AUTO: 9.4 X10(3) UL (ref 4–11)

## 2021-01-10 PROCEDURE — 74177 CT ABD & PELVIS W/CONTRAST: CPT | Performed by: EMERGENCY MEDICINE

## 2021-01-10 PROCEDURE — 99223 1ST HOSP IP/OBS HIGH 75: CPT | Performed by: INTERNAL MEDICINE

## 2021-01-10 RX ORDER — MORPHINE SULFATE 4 MG/ML
2 INJECTION, SOLUTION INTRAMUSCULAR; INTRAVENOUS EVERY 30 MIN PRN
Status: DISCONTINUED | OUTPATIENT
Start: 2021-01-10 | End: 2021-01-17

## 2021-01-10 RX ORDER — MORPHINE SULFATE 4 MG/ML
2 INJECTION, SOLUTION INTRAMUSCULAR; INTRAVENOUS ONCE
Status: COMPLETED | OUTPATIENT
Start: 2021-01-10 | End: 2021-01-10

## 2021-01-11 ENCOUNTER — ANESTHESIA EVENT (OUTPATIENT)
Dept: ENDOSCOPY | Facility: HOSPITAL | Age: 81
DRG: 445 | End: 2021-01-11
Payer: MEDICARE

## 2021-01-11 PROBLEM — K83.9 BILE DUCT ABNORMALITY: Status: ACTIVE | Noted: 2021-01-11

## 2021-01-11 PROBLEM — R10.10 UPPER ABDOMINAL PAIN: Status: ACTIVE | Noted: 2021-01-11

## 2021-01-11 PROBLEM — K85.90 ACUTE PANCREATITIS WITHOUT INFECTION OR NECROSIS, UNSPECIFIED PANCREATITIS TYPE: Status: ACTIVE | Noted: 2021-01-11

## 2021-01-11 LAB
AFP-TM SERPL-MCNC: 2.7 NG/ML (ref ?–8)
ALBUMIN SERPL-MCNC: 2.9 G/DL (ref 3.4–5)
ALBUMIN/GLOB SERPL: 0.9 {RATIO} (ref 1–2)
ALP LIVER SERPL-CCNC: 382 U/L
ALT SERPL-CCNC: 116 U/L
ANION GAP SERPL CALC-SCNC: 6 MMOL/L (ref 0–18)
AST SERPL-CCNC: 149 U/L (ref 15–37)
BASOPHILS # BLD AUTO: 0.04 X10(3) UL (ref 0–0.2)
BASOPHILS NFR BLD AUTO: 0.4 %
BILIRUB SERPL-MCNC: 1.2 MG/DL (ref 0.1–2)
BUN BLD-MCNC: 24 MG/DL (ref 7–18)
BUN/CREAT SERPL: 23.8 (ref 10–20)
CALCIUM BLD-MCNC: 9.3 MG/DL (ref 8.5–10.1)
CANCER AG19-9 SERPL-ACNC: 76.7 U/ML (ref ?–37)
CHLORIDE SERPL-SCNC: 114 MMOL/L (ref 98–112)
CO2 SERPL-SCNC: 22 MMOL/L (ref 21–32)
CREAT BLD-MCNC: 1.01 MG/DL
DEPRECATED RDW RBC AUTO: 45.1 FL (ref 35.1–46.3)
EOSINOPHIL # BLD AUTO: 0 X10(3) UL (ref 0–0.7)
EOSINOPHIL NFR BLD AUTO: 0 %
ERYTHROCYTE [DISTWIDTH] IN BLOOD BY AUTOMATED COUNT: 12.6 % (ref 11–15)
GLOBULIN PLAS-MCNC: 3.1 G/DL (ref 2.8–4.4)
GLUCOSE BLD-MCNC: 81 MG/DL (ref 70–99)
HCT VFR BLD AUTO: 43.5 %
HGB BLD-MCNC: 14 G/DL
IMM GRANULOCYTES # BLD AUTO: 0.04 X10(3) UL (ref 0–1)
IMM GRANULOCYTES NFR BLD: 0.4 %
LYMPHOCYTES # BLD AUTO: 0.36 X10(3) UL (ref 1–4)
LYMPHOCYTES NFR BLD AUTO: 3.9 %
M PROTEIN MFR SERPL ELPH: 6 G/DL (ref 6.4–8.2)
MCH RBC QN AUTO: 31.2 PG (ref 26–34)
MCHC RBC AUTO-ENTMCNC: 32.2 G/DL (ref 31–37)
MCV RBC AUTO: 96.9 FL
MONOCYTES # BLD AUTO: 0.73 X10(3) UL (ref 0.1–1)
MONOCYTES NFR BLD AUTO: 8 %
NEUTROPHILS # BLD AUTO: 7.95 X10 (3) UL (ref 1.5–7.7)
NEUTROPHILS # BLD AUTO: 7.95 X10(3) UL (ref 1.5–7.7)
NEUTROPHILS NFR BLD AUTO: 87.3 %
OSMOLALITY SERPL CALC.SUM OF ELEC: 297 MOSM/KG (ref 275–295)
PLATELET # BLD AUTO: 179 10(3)UL (ref 150–450)
POTASSIUM SERPL-SCNC: 4.1 MMOL/L (ref 3.5–5.1)
RBC # BLD AUTO: 4.49 X10(6)UL
SODIUM SERPL-SCNC: 142 MMOL/L (ref 136–145)
WBC # BLD AUTO: 9.1 X10(3) UL (ref 4–11)

## 2021-01-11 RX ORDER — SODIUM CHLORIDE, SODIUM LACTATE, POTASSIUM CHLORIDE, CALCIUM CHLORIDE 600; 310; 30; 20 MG/100ML; MG/100ML; MG/100ML; MG/100ML
INJECTION, SOLUTION INTRAVENOUS CONTINUOUS
Status: DISCONTINUED | OUTPATIENT
Start: 2021-01-11 | End: 2021-01-11

## 2021-01-11 RX ORDER — ONDANSETRON 2 MG/ML
4 INJECTION INTRAMUSCULAR; INTRAVENOUS EVERY 6 HOURS PRN
Status: DISCONTINUED | OUTPATIENT
Start: 2021-01-11 | End: 2021-01-17

## 2021-01-11 RX ORDER — MONTELUKAST SODIUM 10 MG/1
10 TABLET ORAL NIGHTLY
Status: DISCONTINUED | OUTPATIENT
Start: 2021-01-11 | End: 2021-01-15

## 2021-01-11 RX ORDER — SODIUM CHLORIDE, SODIUM LACTATE, POTASSIUM CHLORIDE, CALCIUM CHLORIDE 600; 310; 30; 20 MG/100ML; MG/100ML; MG/100ML; MG/100ML
INJECTION, SOLUTION INTRAVENOUS CONTINUOUS
Status: DISCONTINUED | OUTPATIENT
Start: 2021-01-11 | End: 2021-01-13

## 2021-01-11 RX ORDER — MELATONIN
3 NIGHTLY PRN
Status: DISCONTINUED | OUTPATIENT
Start: 2021-01-11 | End: 2021-01-15

## 2021-01-11 RX ORDER — HEPARIN SODIUM 5000 [USP'U]/ML
5000 INJECTION, SOLUTION INTRAVENOUS; SUBCUTANEOUS EVERY 8 HOURS SCHEDULED
Status: DISCONTINUED | OUTPATIENT
Start: 2021-01-11 | End: 2021-01-11

## 2021-01-11 RX ORDER — GEMFIBROZIL 600 MG/1
300 TABLET, FILM COATED ORAL
Status: DISCONTINUED | OUTPATIENT
Start: 2021-01-11 | End: 2021-01-15

## 2021-01-11 RX ORDER — ACETAMINOPHEN 325 MG/1
650 TABLET ORAL EVERY 6 HOURS PRN
Status: DISCONTINUED | OUTPATIENT
Start: 2021-01-11 | End: 2021-01-17

## 2021-01-11 RX ORDER — ACETAMINOPHEN AND CODEINE PHOSPHATE 120; 12 MG/5ML; MG/5ML
5 SOLUTION ORAL EVERY 6 HOURS PRN
Status: DISCONTINUED | OUTPATIENT
Start: 2021-01-11 | End: 2021-01-15

## 2021-01-11 NOTE — OCCUPATIONAL THERAPY NOTE
OCCUPATIONAL THERAPY EVALUATION - INPATIENT     Room Number: 5873/6868-D  Evaluation Date: 1/11/2021  Type of Evaluation: Initial  Presenting Problem: gastronomy complication    Physician Order: IP Consult to Occupational Therapy  Reason for Therapy: ADL/I Function: Pt reports living at home with spouse who provides standby level of assist for ADLs and mobility. Pt reports she is about 50/50 with urinating in brief and making it to the toilet at home.  Pt reports they have     SUBJECTIVE   \"It was staying in personal grooming such as brushing teeth?: None  -   Eating meals?: None    AM-PAC Score:  Score: 20  Approx Degree of Impairment: 38.32%  Standardized Score (AM-PAC Scale): 42.03  CMS Modifier (G-Code): CJ    FUNCTIONAL TRANSFER ASSESSMENT  Supine to Sit The patient is functioning below her previous functional level and would benefit from skilled inpatient OT to address the above deficits, maximizing patient’s ability to return safely to her prior level of function.     Patient Complexity  Occupational gloves throughout tx session. Pt wore mask, and spouse wore mask throughout session.

## 2021-01-11 NOTE — SLP NOTE
ADULT SWALLOWING EVALUATION    ASSESSMENT    ASSESSMENT/OVERALL IMPRESSION:  Patient is an 78 y/o female admitted with pain and bleeding noted around gastrostomy tube.  Patient with history of oropharyngeal dysphagia and received PEG tube over 20 years ago consistency    Problem List  Principal Problem:    Gastrostomy complication (HCC)  Active Problems:    Bile duct abnormality    Upper abdominal pain    Acute pancreatitis without infection or necrosis, unspecified pancreatitis type      Past Medical Histor contact Emigdio Franks M.A., [address] [facility]  Pager: [phone]

## 2021-01-11 NOTE — ANESTHESIA PREPROCEDURE EVALUATION
PRE-OP EVALUATION    Patient Name: Luz Marina Figueroa    Pre-op Diagnosis: Pancreatic duct dilated [K86.89]  Dilated cbd, acquired [K83.8]    Procedure(s):  ENDOSCOPIC ULTRASOUND (EUS)    Surgeon(s) and Role:     Isaías Cuevas MD - Primary    Pre-op v hours as needed. , Disp: , Rfl: , Past Week at Unknown time    •  gemfibrozil 600 MG Oral Tab, Take 300 mg by mouth 2 (two) times daily before meals. , Disp: , Rfl: , 1/10/2021 at Unknown time    •  Ondansetron HCl 4 MG/5ML Oral Solution, 4 mg by Enteral rou anxiety  (+) CVA and no residual deficits                         Past Surgical History:   Procedure Laterality Date   • CARDIAC PACEMAKER PLACEMENT     • IR ANGIOGRAM CEREBRAL CAROTID UNILATERAL  10/26/2020        • IR INTRA ARTERIAL STROKE INTERVENTION

## 2021-01-11 NOTE — PLAN OF CARE
Assumed care at 0730. Pt received in bed. A&Ox4. NSR on tele, RA. VSS. LR infusing at 100mL/hour. Electrolyte protocol in place. R arm precautions - pacemaker. Fall precautions. PT/OT to see.  Dressing around G-tube changed this am. Meds given via G-tube, p period  Description: INTERVENTIONS  - Monitor WBC  - Administer growth factors as ordered  - Implement neutropenic guidelines  Outcome: Progressing     Problem: SAFETY ADULT - FALL  Goal: Free from fall injury  Description: INTERVENTIONS:  - Assess pt freq

## 2021-01-11 NOTE — PROGRESS NOTES
01/11/21 1204   Clinical Encounter Type   Visited With Patient and family together   Routine Visit Introduction   Continue Visiting No  ( remains available at pager [phone])   Referral To   ( completed POA with pt.    gave pt Tim

## 2021-01-11 NOTE — DIETARY NOTE
[facility]    NUTRITION ASSESSMENT    Pt does not meet malnutrition criteria.     NUTRITION DIAGNOSIS/PROBLEM:    Predicted suboptimal energy intake related to medical therapy predicted to decrease ability to consume sufficient intake as evidenced by N EVALUATE/NUTRITION GOALS:    3. No signs of skin breakdown  4. Maintain lean body mass  5. Tolerance of enteral nutrition without signs/symptoms of intolerance (abdominal discomfort/distention)  6.  Alternative means of nutrition at goal to meet 100% patien

## 2021-01-11 NOTE — ED INITIAL ASSESSMENT (HPI)
Pt arrived via EMS for gtube bleeding. Pt states has been bleeding since this morning due to inability to swallow also c/o chronic abdominal pain.

## 2021-01-11 NOTE — PLAN OF CARE
Assumed patient care @0030  NPO, g-tube LLQ, aspiration precautions  Patient is Aox4  On room air  NSR on tele, hx of afib rvr, on eliquis, pacemaker on left,   Right limb alert, no BP  Voids, briefed d/t weakness  Last BM over 1 week ago  Denies pain Progressing     Problem: SAFETY ADULT - FALL  Goal: Free from fall injury  Description: INTERVENTIONS:  - Assess pt frequently for physical needs  - Identify cognitive and physical deficits and behaviors that affect risk of falls.   - [place] fall precaut

## 2021-01-11 NOTE — CONSULTS
[facility]                       [facility]    Cecy Trever Patient Status:  Inpatient    10/9/1940 MRN [id number]   [facility] 3NE-A Attending Archana Ventura MD   Hosp Day # 0 PCP NAHUM • COPD (chronic obstructive pulmonary disease) (HCC)    • Osteoarthritis    • Pneumonia due to organism    • Problems with swallowing    • Renal disorder    • Stroke [facility])      PSHx:   Past Surgical History:   Procedure Laterality Date   • CARDIAC PACEMA history of IV drug use or other illicit substances. FamHx: The patient has no family history of colon cancer or other gastrointestinal malignancies;   No family history of ulcer disease, or inflammatory bowel disease  ROS:  In addition to the pertinent pos with dried blood noted around gastrostomy site; tube freely moveable (in/out and turned in a 360 degree manner without pain).  G-tube secured to 6 cm across the skin wall  Ext: No peripheral edema or cyanosis  Skin: Warm and dry  Psychiatric: Appropriate mo lung bases. LIVER:  There is a hypo enhancing area arising from the dome of the right lobe of the liver measuring 2.1 x 2.3 cm. Underlying mass cannot be excluded. BILIARY:  There is marked intrahepatic and common bile duct dilatation.   Common bile du Eliquis), and esophageal strictures who is G-tube dependent for 25+ yrs admitted with bleeding around her gastrostomy. Hgb 14 from 15.5 on arrival and only old blood noted around gastrostomy--no bleeding on lavage, no melena.  CT a/p IV suggests G-tube with common bile duct. Patient also found to have elevated LFTs including alkaline phosphatase but normal bilirubin. Lipase >1500.   Dried blood noted on G-tube dressing with no active bleeding on exam.  Currently with no abdominal pain but previously with carlos

## 2021-01-11 NOTE — CM/SW NOTE
01/11/21 1300   CM/SW Screening   Referral Source Social Work (self-referral)   Information Source Nursing rounds; Chart review;[facility] staff   Patient's Mental Status Alert;Oriented   Patient lives with Spouse       MSW spoke to pt's spouse over the phone,

## 2021-01-11 NOTE — PROGRESS NOTES
[facility]  Progress Note     Heriberto Patricioalicia Patient Status:  Inpatient    10/9/1940 MRN [id number]   [facility] 3NE-A Attending Misa Tatum MD   Hosp Day # 0 PCP Lobo Downs     Chief Complaint:  Blood around g tube  S: Co elevated,  5. Pain management antiemetics as needed  2. Elevated lipase - unclear if acute pancreatitis - no epigastric TTP, may be related to ampullary lesion   1. IVF, anti emetics, pain control  2. GI consulted  3.  Bleeding around Gtube site - minimal,

## 2021-01-11 NOTE — ED PROVIDER NOTES
Patient Seen in: [facility] Emergency Department      History   Patient presents with:  Cath Tube Problem    Stated Complaint: G-tube problem    HPI/Subjective:   HPI    11-year-old female presents for evaluation of 2 complaints.   First, the patient History    Tobacco Use      Smoking status: Former Smoker      Smokeless tobacco: Never Used    Alcohol use: Not Currently    Drug use: Never             Review of Systems    Positive for stated complaint: G-tube problem  Other systems are as noted in HPI. PLATELET.   Procedure                               Abnormality         Status                     ---------                               -----------         ------                     CBC W/ DIFFERENTIAL[[id number]]          Abnormal            Final resul up to 5 mm. Royann Guanakito PANCREAS:  Homogeneous enhancement. Pancreatic ductal dilatation. SPLEEN:  Normal caliber. KIDNEYS:  No hydronephrosis. There is cortical scarring both kidneys. . ADRENALS:  Normal. AORTA/VASCULAR:  Aortoiliac calcification.  RETROPERITONEUM: follow-up provider specified.         Medications Prescribed:  Current Discharge Medication List

## 2021-01-11 NOTE — PROGRESS NOTES
NURSING ADMISSION NOTE      Patient admitted via Cart  Oriented to room 3242  Safety precautions initiated. Bed in low position. Call light in reach. Admission navigator completed.

## 2021-01-11 NOTE — PHYSICAL THERAPY NOTE
PHYSICAL THERAPY EVALUATION - INPATIENT     Room Number: 7776/1584-O  Evaluation Date: 1/11/2021  Type of Evaluation: Initial  Physician Order: PT Eval and Treat    Presenting Problem: Gastrostomy complication  Reason for Therapy: Mobility Dysfunctio 10/26/2020        • LUMPECTOMY RIGHT         HOME SITUATION  Type of Home: Condo   Home Layout: One level                Lives With: Spouse;Caregiver part-time     Patient Owned Equipment: Rolling walker  Patient Regularly Uses: None    Prior Level of Inde a wheelchair)?: A Little   -   Need to walk in hospital room?: A Little   -   Climbing 3-5 steps with a railing?: A Little       AM-PAC Score:  Raw Score: 19   Approx Degree of Impairment: 41.77%   Standardized Score (AM-PAC Scale): 45.44   CMS Modifier (G constipation, history of cancer, and current pacemaker. In this PT evaluation, the patient presents with the following impairments including gait, transfers, and balance.   Functional outcome measures completed include The AM-PAC '6-Clicks' Inpatient Basic

## 2021-01-11 NOTE — H&P
[facility]  History and Physical     Sinda Frame Patient Status:  Emergency    10/9/1940 MRN [id number]   Location [address] [facility] Attending Reyna Aparicio, *   Hosp Day # 0  [facility]     [facility] OTHER (SEE COMMENTS)    Comment:Myalgias  Sulfa Antibiotics       RASH  Warfarin                OTHER (SEE COMMENTS)    Comment:Known PAF             She has recurrent bleeding issues w this product    Medications:  No current facility-administered medicat 4.5\" (1.638 m)   Wt 124 lb 8 oz (56.5 kg)   SpO2 95%   BMI 21.04 kg/m²   General: No acute distress. Alert and oriented x 3. Elderly/frail appearing  HEENT: Normocephalic atraumatic. Moist mucous membranes. EOM-I. PERRLA. Anicteric.   Neck: No lymphadenopa CTAP  1. HDS, Hgb WNL  2. GI consulted  4. Mild JORGE - likely prerenal 2/2 volume depletion  1. Gentle IVF, hold lasix  5. Ovarian mass on CTAP, concerning for neoplasm  1. Likely outpt workup  6. Hx of recent CVA s/p R MCA embolectomy  1. PT/OT  7.  Chronic

## 2021-01-12 ENCOUNTER — APPOINTMENT (OUTPATIENT)
Dept: GENERAL RADIOLOGY | Facility: HOSPITAL | Age: 81
DRG: 445 | End: 2021-01-12
Attending: NURSE PRACTITIONER
Payer: MEDICARE

## 2021-01-12 ENCOUNTER — ANESTHESIA (OUTPATIENT)
Dept: ENDOSCOPY | Facility: HOSPITAL | Age: 81
DRG: 445 | End: 2021-01-12
Payer: MEDICARE

## 2021-01-12 LAB
ALBUMIN SERPL-MCNC: 2.8 G/DL (ref 3.4–5)
ALBUMIN/GLOB SERPL: 0.8 {RATIO} (ref 1–2)
ALP LIVER SERPL-CCNC: 307 U/L
ALT SERPL-CCNC: 90 U/L
ANION GAP SERPL CALC-SCNC: 10 MMOL/L (ref 0–18)
AST SERPL-CCNC: 88 U/L (ref 15–37)
BASOPHILS # BLD AUTO: 0.04 X10(3) UL (ref 0–0.2)
BASOPHILS NFR BLD AUTO: 0.7 %
BILIRUB SERPL-MCNC: 0.8 MG/DL (ref 0.1–2)
BUN BLD-MCNC: 20 MG/DL (ref 7–18)
BUN/CREAT SERPL: 25 (ref 10–20)
CALCIUM BLD-MCNC: 9.2 MG/DL (ref 8.5–10.1)
CHLORIDE SERPL-SCNC: 110 MMOL/L (ref 98–112)
CO2 SERPL-SCNC: 20 MMOL/L (ref 21–32)
CREAT BLD-MCNC: 0.8 MG/DL
DEPRECATED RDW RBC AUTO: 45 FL (ref 35.1–46.3)
EOSINOPHIL # BLD AUTO: 0.1 X10(3) UL (ref 0–0.7)
EOSINOPHIL NFR BLD AUTO: 1.7 %
ERYTHROCYTE [DISTWIDTH] IN BLOOD BY AUTOMATED COUNT: 12.4 % (ref 11–15)
GLOBULIN PLAS-MCNC: 3.4 G/DL (ref 2.8–4.4)
GLUCOSE BLD-MCNC: 163 MG/DL (ref 70–99)
GLUCOSE BLD-MCNC: 57 MG/DL (ref 70–99)
HCT VFR BLD AUTO: 44.9 %
HGB BLD-MCNC: 14.2 G/DL
IMM GRANULOCYTES # BLD AUTO: 0.02 X10(3) UL (ref 0–1)
IMM GRANULOCYTES NFR BLD: 0.3 %
LYMPHOCYTES # BLD AUTO: 1.34 X10(3) UL (ref 1–4)
LYMPHOCYTES NFR BLD AUTO: 23.1 %
M PROTEIN MFR SERPL ELPH: 6.2 G/DL (ref 6.4–8.2)
MCH RBC QN AUTO: 31.4 PG (ref 26–34)
MCHC RBC AUTO-ENTMCNC: 31.6 G/DL (ref 31–37)
MCV RBC AUTO: 99.3 FL
MONOCYTES # BLD AUTO: 0.68 X10(3) UL (ref 0.1–1)
MONOCYTES NFR BLD AUTO: 11.7 %
NEUTROPHILS # BLD AUTO: 3.63 X10 (3) UL (ref 1.5–7.7)
NEUTROPHILS # BLD AUTO: 3.63 X10(3) UL (ref 1.5–7.7)
NEUTROPHILS NFR BLD AUTO: 62.5 %
OSMOLALITY SERPL CALC.SUM OF ELEC: 290 MOSM/KG (ref 275–295)
PLATELET # BLD AUTO: 170 10(3)UL (ref 150–450)
POTASSIUM SERPL-SCNC: 3.5 MMOL/L (ref 3.5–5.1)
RBC # BLD AUTO: 4.52 X10(6)UL
SODIUM SERPL-SCNC: 140 MMOL/L (ref 136–145)
WBC # BLD AUTO: 5.8 X10(3) UL (ref 4–11)

## 2021-01-12 PROCEDURE — 74220 X-RAY XM ESOPHAGUS 1CNTRST: CPT | Performed by: NURSE PRACTITIONER

## 2021-01-12 PROCEDURE — 99232 SBSQ HOSP IP/OBS MODERATE 35: CPT | Performed by: HOSPITALIST

## 2021-01-12 RX ORDER — DEXTROSE MONOHYDRATE 25 G/50ML
50 INJECTION, SOLUTION INTRAVENOUS ONCE
Status: COMPLETED | OUTPATIENT
Start: 2021-01-12 | End: 2021-01-12

## 2021-01-12 RX ORDER — DEXTROSE MONOHYDRATE 50 MG/ML
INJECTION, SOLUTION INTRAVENOUS CONTINUOUS
Status: DISCONTINUED | OUTPATIENT
Start: 2021-01-12 | End: 2021-01-13

## 2021-01-12 NOTE — PHYSICAL THERAPY NOTE
PHYSICAL THERAPY TREATMENT NOTE - INPATIENT    Room Number: 7208/3816-F     Session: 1   Number of Visits to Meet Established Goals: 3    Presenting Problem: Gastrostomy complication    Problem List  Principal Problem:    Gastrostomy complication ([address]  A Sitting down on and standing up from a chair with arms (e.g., wheelchair, bedside commode, etc.): None   -   Moving from lying on back to sitting on the side of the bed?: None   How much help from another person does the patient currently need. ..   -   Mov 150s. Pt c/o pain in abdomen. Recommend amb with RW. The AM-PAC '6-Clicks' Inpatient Basic Mobility Short Form was completed and this patient is demonstrating a 28.97% degree of impairment in mobility.  Research supports that patients with this level of imp

## 2021-01-12 NOTE — CM/SW NOTE
1:25pm  MSW met with pt and her spouse at bedside. Plan is home with [facility] and spouse has a caregiver in place 3x a week. No other needs per patient and her spouse.

## 2021-01-12 NOTE — PROGRESS NOTES
[facility] Gastroenterology Progress Note    CC: Abnormal CT     S:  Pt feeling well with no complaints at this time.      O: /71 (BP Location: Left arm)   Pulse 90   Temp 98 °F (36.7 °C) (Oral)   Resp 18   Ht 5' 4.5\" (1.638 m)   Wt 124 lb 8 oz

## 2021-01-12 NOTE — PLAN OF CARE
Patient alert and oriented x4, VS stable, RA, , Afib/A-paced on tele  No complaint of pain of discomfort  BG 57 this morning, D50 amp administered, pt started on D5 IV solution  NPO   Eliquis on hold for now  Scheduled EUS  Xray of esophagus with barium

## 2021-01-12 NOTE — PLAN OF CARE
Assumed patient care @1930  NPO, g-tube LLQ, aspiration precautions  Patient is Aox4  On room air  NSR on tele, hx of afib rvr, eliquis on hold, pacemaker on left,   Right limb alert, no BP  Voids, briefed d/t weakness  Last BM over 1 week ago  Denies p assistance with activity based on assessment  - Modify environment to reduce risk of injury  - Provide assistive devices as appropriate  - Consider OT/PT consult to assist with strengthening/mobility  - Encourage toileting schedule  Outcome: Progressing

## 2021-01-12 NOTE — CM/SW NOTE
BPCI-Advanced Medicare Program Note:  Plan of care reviewed for care coordination and discharge planning. Noted pt falls under  BPCI/Medicare program, with  (Disorders Of Liver Except Malignancy, Cirrhosis Or Alcoholic Hepatitis).  [address] Berenice Abrams recommendatio

## 2021-01-12 NOTE — PROGRESS NOTES
[facility]  Progress Note     Aashish Yang Patient Status:  Inpatient    10/9/1940 MRN [id number]   [facility] 3NE-A Attending Kristyn Hoff, DO    Hosp Day # 1 PCP Geovanny Duncan     Chief Complaint:  Blood around g tube  S consulted > plan for  EUS today   2. Trend LFT's  Down trending   3. EUS Tuesday  4. CA 19 elevated   5. Pain management antiemetics as needed  2. Elevated lipase - unclear if acute pancreatitis - no epigastric TTP, may be related to ampullary lesion   1.

## 2021-01-13 ENCOUNTER — APPOINTMENT (OUTPATIENT)
Dept: GENERAL RADIOLOGY | Facility: HOSPITAL | Age: 81
DRG: 445 | End: 2021-01-13
Attending: NURSE PRACTITIONER
Payer: MEDICARE

## 2021-01-13 PROBLEM — R44.3 HALLUCINATIONS: Status: ACTIVE | Noted: 2021-01-13

## 2021-01-13 LAB
ALBUMIN SERPL-MCNC: 3 G/DL (ref 3.4–5)
ALBUMIN/GLOB SERPL: 1 {RATIO} (ref 1–2)
ALP LIVER SERPL-CCNC: 254 U/L
ALT SERPL-CCNC: 77 U/L
AMMONIA PLAS-MCNC: 15 UMOL/L (ref 11–32)
ANION GAP SERPL CALC-SCNC: 8 MMOL/L (ref 0–18)
AST SERPL-CCNC: 64 U/L (ref 15–37)
BASOPHILS # BLD AUTO: 0.04 X10(3) UL (ref 0–0.2)
BASOPHILS NFR BLD AUTO: 0.5 %
BILIRUB SERPL-MCNC: 0.6 MG/DL (ref 0.1–2)
BILIRUB UR QL STRIP.AUTO: NEGATIVE
BUN BLD-MCNC: 9 MG/DL (ref 7–18)
BUN/CREAT SERPL: 11.8 (ref 10–20)
CALCIUM BLD-MCNC: 8.9 MG/DL (ref 8.5–10.1)
CHLORIDE SERPL-SCNC: 108 MMOL/L (ref 98–112)
CO2 SERPL-SCNC: 24 MMOL/L (ref 21–32)
COLOR UR AUTO: YELLOW
CREAT BLD-MCNC: 0.76 MG/DL
DEPRECATED RDW RBC AUTO: 42.8 FL (ref 35.1–46.3)
EOSINOPHIL # BLD AUTO: 0.09 X10(3) UL (ref 0–0.7)
EOSINOPHIL NFR BLD AUTO: 1.1 %
ERYTHROCYTE [DISTWIDTH] IN BLOOD BY AUTOMATED COUNT: 12.4 % (ref 11–15)
GLOBULIN PLAS-MCNC: 3 G/DL (ref 2.8–4.4)
GLUCOSE BLD-MCNC: 103 MG/DL (ref 70–99)
GLUCOSE BLD-MCNC: 118 MG/DL (ref 70–99)
GLUCOSE BLD-MCNC: 129 MG/DL (ref 70–99)
GLUCOSE BLD-MCNC: 83 MG/DL (ref 70–99)
GLUCOSE BLD-MCNC: 87 MG/DL (ref 70–99)
GLUCOSE UR STRIP.AUTO-MCNC: NEGATIVE MG/DL
HAV IGM SER QL: 1.9 MG/DL (ref 1.6–2.6)
HCT VFR BLD AUTO: 47.8 %
HGB BLD-MCNC: 15.4 G/DL
IMM GRANULOCYTES # BLD AUTO: 0.03 X10(3) UL (ref 0–1)
IMM GRANULOCYTES NFR BLD: 0.4 %
KETONES UR STRIP.AUTO-MCNC: NEGATIVE MG/DL
LYMPHOCYTES # BLD AUTO: 1.47 X10(3) UL (ref 1–4)
LYMPHOCYTES NFR BLD AUTO: 18 %
M PROTEIN MFR SERPL ELPH: 6 G/DL (ref 6.4–8.2)
MCH RBC QN AUTO: 30.6 PG (ref 26–34)
MCHC RBC AUTO-ENTMCNC: 32.2 G/DL (ref 31–37)
MCV RBC AUTO: 94.8 FL
MONOCYTES # BLD AUTO: 0.78 X10(3) UL (ref 0.1–1)
MONOCYTES NFR BLD AUTO: 9.6 %
NEUTROPHILS # BLD AUTO: 5.75 X10 (3) UL (ref 1.5–7.7)
NEUTROPHILS # BLD AUTO: 5.75 X10(3) UL (ref 1.5–7.7)
NEUTROPHILS NFR BLD AUTO: 70.4 %
NITRITE UR QL STRIP.AUTO: POSITIVE
OSMOLALITY SERPL CALC.SUM OF ELEC: 288 MOSM/KG (ref 275–295)
PH UR STRIP.AUTO: 8 [PH] (ref 4.5–8)
PLATELET # BLD AUTO: 220 10(3)UL (ref 150–450)
POTASSIUM SERPL-SCNC: 2.8 MMOL/L (ref 3.5–5.1)
POTASSIUM SERPL-SCNC: 2.9 MMOL/L (ref 3.5–5.1)
POTASSIUM SERPL-SCNC: 4 MMOL/L (ref 3.5–5.1)
PROT UR STRIP.AUTO-MCNC: 30 MG/DL
RBC # BLD AUTO: 5.04 X10(6)UL
SODIUM SERPL-SCNC: 140 MMOL/L (ref 136–145)
SP GR UR STRIP.AUTO: 1.01 (ref 1–1.03)
UROBILINOGEN UR STRIP.AUTO-MCNC: <2 MG/DL
WBC # BLD AUTO: 8.2 X10(3) UL (ref 4–11)
WBC #/AREA URNS AUTO: >50 /HPF

## 2021-01-13 PROCEDURE — 99232 SBSQ HOSP IP/OBS MODERATE 35: CPT | Performed by: HOSPITALIST

## 2021-01-13 PROCEDURE — 71045 X-RAY EXAM CHEST 1 VIEW: CPT | Performed by: NURSE PRACTITIONER

## 2021-01-13 RX ORDER — LEVOFLOXACIN 750 MG/1
750 TABLET ORAL DAILY
Status: DISCONTINUED | OUTPATIENT
Start: 2021-01-13 | End: 2021-01-14

## 2021-01-13 RX ORDER — POTASSIUM CHLORIDE 14.9 MG/ML
20 INJECTION INTRAVENOUS ONCE
Status: COMPLETED | OUTPATIENT
Start: 2021-01-13 | End: 2021-01-13

## 2021-01-13 RX ORDER — QUETIAPINE 25 MG/1
25 TABLET, FILM COATED ORAL 2 TIMES DAILY
Status: DISCONTINUED | OUTPATIENT
Start: 2021-01-13 | End: 2021-01-14

## 2021-01-13 RX ORDER — POTASSIUM CHLORIDE 14.9 MG/ML
20 INJECTION INTRAVENOUS ONCE
Status: DISCONTINUED | OUTPATIENT
Start: 2021-01-13 | End: 2021-01-13

## 2021-01-13 NOTE — PROGRESS NOTES
[facility] Gastroenterology Progress Note    CC: Abnormal CT     S:  Pt with some confusion overnight and today.      O: /77 (BP Location: Right arm)   Pulse 76   Temp 97.7 °F (36.5 °C) (Oral)   Resp 18   Ht 5' 4.5\" (1.638 m)   Wt 124 lb 8 oz (5

## 2021-01-13 NOTE — PLAN OF CARE
Assumed patient care @1930  NPO, g-tube LLQ, aspiration precautions  Patient is Aox4  On room air  NSR on tele, hx of afib rvr, eliquis on hold, pacemaker on left,   VSS-BP wnl-administered meds as prescribed  Right limb alert, no BP  Incontinent of uri activity based on assessment  - Modify environment to reduce risk of injury  - Provide assistive devices as appropriate  - Consider OT/PT consult to assist with strengthening/mobility  - Encourage toileting schedule  Outcome: Progressing     Problem: [facility]

## 2021-01-13 NOTE — PLAN OF CARE
Assumed care at 7;30 am.  Cardiac monitoring. NPO, pateint education provided. Meds admin via g-tube. Bed alarm on. Patient experiencing hallucinations, hospitalist/APN notified. UA collected/CXR completed.   Hsopitalist notified of emesis, zofran admin schedule  Outcome: Progressing

## 2021-01-13 NOTE — PROGRESS NOTES
[facility]  Progress Note     Nonnie Longest Patient Status:  Inpatient    10/9/1940 MRN [id number]   [facility] 3NE-A Attending Hellen Crespo, DO    Hosp Day # 2 PCP Rachell Franklin     Chief Complaint:  Blood around g tube  S Creatinine Clearance: 51.2 mL/min (based on SCr of 0.76 mg/dL). No results for input(s): PTP, INR in the last 168 hours. No results for input(s): TROP, CK in the last 168 hours. Imaging: Imaging data reviewed in Epic. ASSESSMENT / PLAN:   1.  Chronic str cath   CXR  P   If WBC elevated, or s/s PNA  Will start abx  Discussed w/   And Dr Smith Zamora sx     Quality:  · DVT Prophylaxis: Eliquis on hold  · CODE status: FULL  · Lock: no  · If COVID testing is negative, may discontinue isolati

## 2021-01-14 LAB
ALBUMIN SERPL-MCNC: 2.6 G/DL (ref 3.4–5)
ALBUMIN SERPL-MCNC: 2.7 G/DL (ref 3.4–5)
ALBUMIN/GLOB SERPL: 0.7 {RATIO} (ref 1–2)
ALBUMIN/GLOB SERPL: 0.8 {RATIO} (ref 1–2)
ALP LIVER SERPL-CCNC: 215 U/L
ALP LIVER SERPL-CCNC: 216 U/L
ALT SERPL-CCNC: 56 U/L
ALT SERPL-CCNC: 58 U/L
ANION GAP SERPL CALC-SCNC: 5 MMOL/L (ref 0–18)
ANION GAP SERPL CALC-SCNC: 9 MMOL/L (ref 0–18)
AST SERPL-CCNC: 31 U/L (ref 15–37)
AST SERPL-CCNC: 32 U/L (ref 15–37)
BASOPHILS # BLD AUTO: 0.03 X10(3) UL (ref 0–0.2)
BASOPHILS NFR BLD AUTO: 0.3 %
BILIRUB SERPL-MCNC: 0.4 MG/DL (ref 0.1–2)
BILIRUB SERPL-MCNC: 0.4 MG/DL (ref 0.1–2)
BUN BLD-MCNC: 10 MG/DL (ref 7–18)
BUN BLD-MCNC: 10 MG/DL (ref 7–18)
BUN/CREAT SERPL: 7.9 (ref 10–20)
BUN/CREAT SERPL: 7.9 (ref 10–20)
CALCIUM BLD-MCNC: 9.2 MG/DL (ref 8.5–10.1)
CALCIUM BLD-MCNC: 9.5 MG/DL (ref 8.5–10.1)
CHLORIDE SERPL-SCNC: 117 MMOL/L (ref 98–112)
CHLORIDE SERPL-SCNC: 117 MMOL/L (ref 98–112)
CO2 SERPL-SCNC: 18 MMOL/L (ref 21–32)
CO2 SERPL-SCNC: 19 MMOL/L (ref 21–32)
CREAT BLD-MCNC: 1.26 MG/DL
CREAT BLD-MCNC: 1.27 MG/DL
DEPRECATED RDW RBC AUTO: 43.4 FL (ref 35.1–46.3)
EOSINOPHIL # BLD AUTO: 0 X10(3) UL (ref 0–0.7)
EOSINOPHIL NFR BLD AUTO: 0 %
ERYTHROCYTE [DISTWIDTH] IN BLOOD BY AUTOMATED COUNT: 12.6 % (ref 11–15)
GLOBULIN PLAS-MCNC: 3.4 G/DL (ref 2.8–4.4)
GLOBULIN PLAS-MCNC: 3.5 G/DL (ref 2.8–4.4)
GLUCOSE BLD-MCNC: 103 MG/DL (ref 70–99)
GLUCOSE BLD-MCNC: 107 MG/DL (ref 70–99)
GLUCOSE BLD-MCNC: 112 MG/DL (ref 70–99)
GLUCOSE BLD-MCNC: 128 MG/DL (ref 70–99)
GLUCOSE BLD-MCNC: 365 MG/DL (ref 70–99)
HCT VFR BLD AUTO: 50.2 %
HGB BLD-MCNC: 16.6 G/DL
IMM GRANULOCYTES # BLD AUTO: 0.07 X10(3) UL (ref 0–1)
IMM GRANULOCYTES NFR BLD: 0.7 %
LYMPHOCYTES # BLD AUTO: 0.8 X10(3) UL (ref 1–4)
LYMPHOCYTES NFR BLD AUTO: 8.2 %
M PROTEIN MFR SERPL ELPH: 6.1 G/DL (ref 6.4–8.2)
M PROTEIN MFR SERPL ELPH: 6.1 G/DL (ref 6.4–8.2)
MCH RBC QN AUTO: 31.2 PG (ref 26–34)
MCHC RBC AUTO-ENTMCNC: 33.1 G/DL (ref 31–37)
MCV RBC AUTO: 94.4 FL
MONOCYTES # BLD AUTO: 0.72 X10(3) UL (ref 0.1–1)
MONOCYTES NFR BLD AUTO: 7.4 %
NEUTROPHILS # BLD AUTO: 8.12 X10 (3) UL (ref 1.5–7.7)
NEUTROPHILS # BLD AUTO: 8.12 X10(3) UL (ref 1.5–7.7)
NEUTROPHILS NFR BLD AUTO: 83.4 %
OSMOLALITY SERPL CALC.SUM OF ELEC: 292 MOSM/KG (ref 275–295)
OSMOLALITY SERPL CALC.SUM OF ELEC: 312 MOSM/KG (ref 275–295)
PLATELET # BLD AUTO: 213 10(3)UL (ref 150–450)
POTASSIUM SERPL-SCNC: 3.8 MMOL/L (ref 3.5–5.1)
POTASSIUM SERPL-SCNC: 4.1 MMOL/L (ref 3.5–5.1)
RBC # BLD AUTO: 5.32 X10(6)UL
SODIUM SERPL-SCNC: 141 MMOL/L (ref 136–145)
SODIUM SERPL-SCNC: 144 MMOL/L (ref 136–145)
WBC # BLD AUTO: 9.7 X10(3) UL (ref 4–11)

## 2021-01-14 PROCEDURE — 99232 SBSQ HOSP IP/OBS MODERATE 35: CPT | Performed by: HOSPITALIST

## 2021-01-14 PROCEDURE — 0D738ZZ DILATION OF LOWER ESOPHAGUS, VIA NATURAL OR ARTIFICIAL OPENING ENDOSCOPIC: ICD-10-PCS | Performed by: INTERNAL MEDICINE

## 2021-01-14 RX ORDER — METOPROLOL TARTRATE 5 MG/5ML
5 INJECTION INTRAVENOUS EVERY 6 HOURS
Status: DISCONTINUED | OUTPATIENT
Start: 2021-01-14 | End: 2021-01-17

## 2021-01-14 RX ORDER — SODIUM CHLORIDE, SODIUM LACTATE, POTASSIUM CHLORIDE, CALCIUM CHLORIDE 600; 310; 30; 20 MG/100ML; MG/100ML; MG/100ML; MG/100ML
INJECTION, SOLUTION INTRAVENOUS CONTINUOUS
Status: DISCONTINUED | OUTPATIENT
Start: 2021-01-14 | End: 2021-01-15

## 2021-01-14 RX ORDER — NALOXONE HYDROCHLORIDE 0.4 MG/ML
80 INJECTION, SOLUTION INTRAMUSCULAR; INTRAVENOUS; SUBCUTANEOUS AS NEEDED
Status: DISCONTINUED | OUTPATIENT
Start: 2021-01-14 | End: 2021-01-14 | Stop reason: HOSPADM

## 2021-01-14 RX ORDER — MIDAZOLAM HYDROCHLORIDE 1 MG/ML
INJECTION INTRAMUSCULAR; INTRAVENOUS AS NEEDED
Status: DISCONTINUED | OUTPATIENT
Start: 2021-01-14 | End: 2021-01-14 | Stop reason: SURG

## 2021-01-14 RX ORDER — LEVOFLOXACIN 5 MG/ML
750 INJECTION, SOLUTION INTRAVENOUS EVERY 24 HOURS
Status: DISCONTINUED | OUTPATIENT
Start: 2021-01-14 | End: 2021-01-15

## 2021-01-14 RX ORDER — LIDOCAINE HYDROCHLORIDE 10 MG/ML
INJECTION, SOLUTION EPIDURAL; INFILTRATION; INTRACAUDAL; PERINEURAL AS NEEDED
Status: DISCONTINUED | OUTPATIENT
Start: 2021-01-14 | End: 2021-01-14 | Stop reason: SURG

## 2021-01-14 RX ORDER — LEVOFLOXACIN 5 MG/ML
750 INJECTION, SOLUTION INTRAVENOUS EVERY 24 HOURS
Status: DISCONTINUED | OUTPATIENT
Start: 2021-01-14 | End: 2021-01-14

## 2021-01-14 RX ORDER — DEXTROSE MONOHYDRATE 25 G/50ML
50 INJECTION, SOLUTION INTRAVENOUS
Status: DISCONTINUED | OUTPATIENT
Start: 2021-01-14 | End: 2021-01-14 | Stop reason: HOSPADM

## 2021-01-14 RX ORDER — SODIUM CHLORIDE 9 MG/ML
INJECTION, SOLUTION INTRAVENOUS CONTINUOUS
Status: DISCONTINUED | OUTPATIENT
Start: 2021-01-14 | End: 2021-01-17

## 2021-01-14 RX ORDER — METOCLOPRAMIDE HYDROCHLORIDE 5 MG/ML
10 INJECTION INTRAMUSCULAR; INTRAVENOUS EVERY 8 HOURS PRN
Status: DISCONTINUED | OUTPATIENT
Start: 2021-01-14 | End: 2021-01-17

## 2021-01-14 RX ORDER — MORPHINE SULFATE 2 MG/ML
1 INJECTION, SOLUTION INTRAMUSCULAR; INTRAVENOUS ONCE
Status: COMPLETED | OUTPATIENT
Start: 2021-01-14 | End: 2021-01-14

## 2021-01-14 RX ADMIN — SODIUM CHLORIDE: 9 INJECTION, SOLUTION INTRAVENOUS at 13:05:00

## 2021-01-14 RX ADMIN — LIDOCAINE HYDROCHLORIDE 100 MG: 10 INJECTION, SOLUTION EPIDURAL; INFILTRATION; INTRACAUDAL; PERINEURAL at 12:55:00

## 2021-01-14 RX ADMIN — MIDAZOLAM HYDROCHLORIDE 1 MG: 1 INJECTION INTRAMUSCULAR; INTRAVENOUS at 12:55:00

## 2021-01-14 NOTE — CM/SW NOTE
TONY, Lamont Wan and RN discussed patient's post d/c needs in care rounds. MSW asked CM to review chart.

## 2021-01-14 NOTE — PROGRESS NOTES
0040  Dr. Sierra Hernandez is aware that pt. Is vomiting green and bile drainage from around G tube site. No further orders per MD.  I reiterated to pt. Per MD that this is the reason for the testing that is to be done today, 1/14. An EGD, EUS, and ERCP.

## 2021-01-14 NOTE — PLAN OF CARE
Resumed care of pt. At [facility]. Pt. Is Ax4, anxious about her Nausea and vomiting and the leakage around her G-tube site. K replaced-now 4.0  NPO    Levaquin for UTI    RA, Tele: NS, -pt.  Removes  Accu: Q6    Zofran given via MAR    G-Tube site leaking consult to assist with strengthening/mobility  - Encourage toileting schedule  Outcome: Progressing

## 2021-01-14 NOTE — PROGRESS NOTES
[facility]  Progress Note     Karen Ureña Patient Status:  Inpatient    10/9/1940 MRN [id number]   [facility] 3NE-A Attending Paige Portillo, DO    Hosp Day # 3 PCP Dilan Jones     Chief Complaint:  Blood around g tube  S (based on SCr of 0.76 mg/dL). No results for input(s): PTP, INR in the last 168 hours. No results for input(s): TROP, CK in the last 168 hours. Imaging: Imaging data reviewed in Epic. ASSESSMENT / PLAN:   1.  Chronic Abdominal pain - CTAP w/ possible Eliquis on hold  · CODE status: FULL  · Lock: no  · If COVID testing is negative, may discontinue isolation: yes     ·   Will the patient be referred to [facility] on discharge?:   Estimated date of discharge:  tbd  Discharge is dependent on:  Clinical course  A

## 2021-01-14 NOTE — PROGRESS NOTES
[facility] Dosing Service  Antibiotic Dosing    Kimberly Tomlinson is a 79year old for whom pharmacy is dosing Levaquin for treatment of  UTI. Allergies: is allergic to penicillin g; statins; sulfa antibiotics; and warfarin.     Vitals: /68 (BP L

## 2021-01-14 NOTE — PROGRESS NOTES
Back from dilation this afternoon- Sbp low given IVF, BB held   Then went into AF w/ RVR.  IV BB given  Started cardizem at 5 mg/ hr  Awaiting to see if Dr Dayday Keller wants her to receive any meds via g tube   No heparin gtt at present   Discussed w/ RONALD De Santiago

## 2021-01-14 NOTE — ANESTHESIA POSTPROCEDURE EVALUATION
[facility] Patient Status:  Inpatient   Age/Gender 79year old female MRN [id number]   Location [facility]. Attending MADHAVI Bermudez 21 Day # 3 PCP Irma Melanie       Anesthesia Post-op Note    Proced

## 2021-01-14 NOTE — PLAN OF CARE
Assumed care at 7:30 am.  Cardiac monitoring. SCDs. PIV fluids running per orders. NPO. Consent signed for procedure by patient/ at bedside and placed in chart.   1620: spoke to Keeley Paz in Endo (Dr. Tyrese Devi in procedure) she will verify diet/meds/

## 2021-01-14 NOTE — OCCUPATIONAL THERAPY NOTE
Attempted to see pt for skilled OT services this date. Pt is currently off floor at procedure. Will re-attempt as schedule allows.  Dylan Garcia, 01/14/21, 2:28 PM

## 2021-01-14 NOTE — PHYSICAL THERAPY NOTE
Chart reviewed and attempted to see pt for PT session today. However, per RN, pt is having a procedure today and is not appropriate for PT as pt is lethargic and inappropriate. Will hold PT for today and attempt on 1/15/21.  CM

## 2021-01-14 NOTE — DIETARY NOTE
[facility]  NUTRITION ASSESSMENT    Pt does not meet malnutrition criteria.     NUTRITION DIAGNOSIS/PROBLEM:  Predicted suboptimal energy intake related to medical therapy predicted to decrease ability to consume sufficient intake as evidenced by NPO s visual exam.    NUTRITION PRESCRIPTION:  Calories: 0161-6800 calories/day (25-30 calories per kg)  Protein: 57-68 grams protein/day (1-1.2 grams protein per kg)  Fluid: ~1 ml/kcal or per MD discretion    MONITOR AND EVALUATE/NUTRITION GOALS:  1. No signs o

## 2021-01-14 NOTE — OPERATIVE REPORT
Geoff Valenzuela Patient Status:  Inpatient    10/9/1940 MRN [id number]   [facility] ENDOSCOPY Attending Delilah Nelson*   Date 2021 PCP NAHUM Mccarty     PREOPERATIVE DIAGNOSIS/INDICATION: Dysphagia, choledocholithiasis, inspection was performed after each dilatation diameter, superficial mucosal break noted only after 12 mm, no complications, perforation of bleeding noted  RECOMMENDATIONS:   - Post EGD precautions, watch for bleeding, infection, perforation, adverse drug

## 2021-01-15 LAB
ANION GAP SERPL CALC-SCNC: 2 MMOL/L (ref 0–18)
BASOPHILS # BLD AUTO: 0.03 X10(3) UL (ref 0–0.2)
BASOPHILS NFR BLD AUTO: 0.5 %
BUN BLD-MCNC: 10 MG/DL (ref 7–18)
BUN/CREAT SERPL: 11.6 (ref 10–20)
CALCIUM BLD-MCNC: 8.5 MG/DL (ref 8.5–10.1)
CHLORIDE SERPL-SCNC: 118 MMOL/L (ref 98–112)
CO2 SERPL-SCNC: 27 MMOL/L (ref 21–32)
CREAT BLD-MCNC: 0.86 MG/DL
DEPRECATED RDW RBC AUTO: 45.9 FL (ref 35.1–46.3)
EOSINOPHIL # BLD AUTO: 0.05 X10(3) UL (ref 0–0.7)
EOSINOPHIL NFR BLD AUTO: 0.8 %
ERYTHROCYTE [DISTWIDTH] IN BLOOD BY AUTOMATED COUNT: 12.7 % (ref 11–15)
GLUCOSE BLD-MCNC: 100 MG/DL (ref 70–99)
GLUCOSE BLD-MCNC: 102 MG/DL (ref 70–99)
GLUCOSE BLD-MCNC: 103 MG/DL (ref 70–99)
GLUCOSE BLD-MCNC: 83 MG/DL (ref 70–99)
GLUCOSE BLD-MCNC: 87 MG/DL (ref 70–99)
HCT VFR BLD AUTO: 43.9 %
HGB BLD-MCNC: 14.1 G/DL
IMM GRANULOCYTES # BLD AUTO: 0.03 X10(3) UL (ref 0–1)
IMM GRANULOCYTES NFR BLD: 0.5 %
LYMPHOCYTES # BLD AUTO: 1.23 X10(3) UL (ref 1–4)
LYMPHOCYTES NFR BLD AUTO: 20.1 %
MCH RBC QN AUTO: 31.4 PG (ref 26–34)
MCHC RBC AUTO-ENTMCNC: 32.1 G/DL (ref 31–37)
MCV RBC AUTO: 97.8 FL
MONOCYTES # BLD AUTO: 0.55 X10(3) UL (ref 0.1–1)
MONOCYTES NFR BLD AUTO: 9 %
NEUTROPHILS # BLD AUTO: 4.23 X10 (3) UL (ref 1.5–7.7)
NEUTROPHILS # BLD AUTO: 4.23 X10(3) UL (ref 1.5–7.7)
NEUTROPHILS NFR BLD AUTO: 69.1 %
OSMOLALITY SERPL CALC.SUM OF ELEC: 302 MOSM/KG (ref 275–295)
PLATELET # BLD AUTO: 189 10(3)UL (ref 150–450)
POTASSIUM SERPL-SCNC: 3.5 MMOL/L (ref 3.5–5.1)
POTASSIUM SERPL-SCNC: 3.8 MMOL/L (ref 3.5–5.1)
RBC # BLD AUTO: 4.49 X10(6)UL
SODIUM SERPL-SCNC: 147 MMOL/L (ref 136–145)
WBC # BLD AUTO: 6.1 X10(3) UL (ref 4–11)

## 2021-01-15 RX ORDER — MONTELUKAST SODIUM 10 MG/1
10 TABLET ORAL NIGHTLY
Status: DISCONTINUED | OUTPATIENT
Start: 2021-01-15 | End: 2021-01-17

## 2021-01-15 RX ORDER — ACETAMINOPHEN AND CODEINE PHOSPHATE 120; 12 MG/5ML; MG/5ML
5 SOLUTION ORAL EVERY 6 HOURS PRN
Status: DISCONTINUED | OUTPATIENT
Start: 2021-01-15 | End: 2021-01-17

## 2021-01-15 RX ORDER — GEMFIBROZIL 600 MG/1
300 TABLET, FILM COATED ORAL
Status: DISCONTINUED | OUTPATIENT
Start: 2021-01-15 | End: 2021-01-17

## 2021-01-15 RX ORDER — MELATONIN
3 NIGHTLY PRN
Status: DISCONTINUED | OUTPATIENT
Start: 2021-01-15 | End: 2021-01-17

## 2021-01-15 RX ORDER — LEVOFLOXACIN 5 MG/ML
750 INJECTION, SOLUTION INTRAVENOUS
Status: DISCONTINUED | OUTPATIENT
Start: 2021-01-16 | End: 2021-01-16

## 2021-01-15 RX ORDER — POTASSIUM CHLORIDE 20 MEQ/1
40 TABLET, EXTENDED RELEASE ORAL EVERY 4 HOURS
Status: DISCONTINUED | OUTPATIENT
Start: 2021-01-15 | End: 2021-01-15

## 2021-01-15 NOTE — PROGRESS NOTES
[facility]  Progress Note     Elijah Ranks Patient Status:  Inpatient    10/9/1940 MRN [id number]   [facility] 3NE-A Attending Justino Mcclellan, DO    Hosp Day # 4 PCP Carlos Blanton     Chief Complaint:  Blood around g tube  S 32 31  --    ALT 77*  --  58* 56  --    BILT 0.6  --  0.4 0.4  --    TP 6.0*  --  6.1* 6.1*  --     < > = values in this interval not displayed. Estimated Creatinine Clearance: 45.3 mL/min (based on SCr of 0.86 mg/dL).   No results for input(s): PTP, I 11. Hypotension -  Bp stable getting meds   12. Hypokalemia - replace / monitor   13.  Hypoglycemia-  D10  Decrease rate starting TF will need to monitor BS       Plan  Needs skin barrier to protect skin around g tube  Start TF  Drinking liquids  AF again

## 2021-01-15 NOTE — PLAN OF CARE
Assumed care of pt around 1915  A&O x4, hallucinations observed by this RN- pt stating seeing a UFO in the TV that was turned off and stated her medication was pink when it was clear in color    on RA  Afib on tele- HR in mid 80's cardizem gtt stopped a Educate pt/family on patient safety including physical limitations  - Instruct pt to call for assistance with activity based on assessment  - Modify environment to reduce risk of injury  - Provide assistive devices as appropriate  - Consider OT/PT consult

## 2021-01-15 NOTE — PHYSICAL THERAPY NOTE
PHYSICAL THERAPY TREATMENT NOTE - INPATIENT    Room Number: 6163/4773-S     Session: 2   Number of Visits to Meet Established Goals: 3    Presenting Problem: Gastrostomy complication    History related to current admission: Pt is 79year old female admitt • IR INTRA ARTERIAL STROKE INTERVENTION  10/26/2020        • LUMPECTOMY RIGHT         SUBJECTIVE  Pt reports feeling well, c/o 9/10 abdominal pain, states pain was \"a 14 last night\".      \"Not really\" when asked if pt has been walking w/ staff re agreeable to therapy. Pt performed supine to EOB transfer w/ supervision to assess pt ability to perform bed mobility independently. Pt performed sit to stand transfer w/ RW and CGA.  Pt received cues to push from surface w/ B UE and use B LE underneath the Goal #3 Patient is able to ambulate 150 feet with assist device: walker - standard at assistance level: supervision      Goal #4     Goal #5     Goal #6     Goal Comments: Goals established on 1/11/2021, Goals ongoing 1/15/2021    Therapist and PT vero

## 2021-01-15 NOTE — CM/SW NOTE
MSW, Navdeep Crowley and RN discussed patient's post d/c needs in care rounds.      Barrier to dc:  Possible G-Tube Malfunction      DC PLAN: Home with spouse, part time care giver and [facility]

## 2021-01-15 NOTE — PROGRESS NOTES
[facility] Gastroenterology Progress Note    CC: Abnormal CT     S:  Pt with some drainage from around G tube site that has been chronic in nature     O: BP (!) 153/140 (BP Location: Right leg)   Pulse 120   Temp 97.4 °F (36.3 °C) (Oral)   Resp 18   H tolerated from an esophageal perspective   - ok to restart eliquis from a GI perspective   - Will need EGD with possible EUS/ERCP in 1 week; will need to be off eliquis at that time     GI will sign off at this time.  Please call back with any questions or

## 2021-01-15 NOTE — SLP NOTE
ADULT SWALLOWING EVALUATION    ASSESSMENT    ASSESSMENT/OVERALL IMPRESSION:  Order received for repeat bedside swallow evaluation to r/o aspiration per MD request. Pt with hx of dysphagia and over 20 year hx of gtube feedings.  Pt reported most recent [facility] inpatient SLP service warranted  Discharge Recommendations/Plan: Home    HISTORY   MEDICAL HISTORY  Reason for Referral: Altered diet consistency    Problem List  Principal Problem:    Gastrostomy complication ([address]  Active Problems:    Bile duct abnormali UP  Treatment Plan/Recommendations: No further inpatient SLP service warranted     Follow Up Needed: No     SLP donned goggles, surgical mask and gloves prior to evaluating patient.   Pt unable to wear mask d/t nature of exam.       Thank you for your refer

## 2021-01-15 NOTE — PROGRESS NOTES
[facility] Pharmacy Note:  Renal Adjustment for levofloxacin (Juan A Buchanan)    Daiana Jackson is a 79year old patient who has been prescribed levofloxacin (LEVAQUIN) 750 mg every 24 hrs.   CrCl is estimated creatinine clearance is 45.3 mL/min (based on SCr of 0.86

## 2021-01-16 LAB
ALBUMIN SERPL-MCNC: 2.2 G/DL (ref 3.4–5)
ALBUMIN/GLOB SERPL: 0.9 {RATIO} (ref 1–2)
ALP LIVER SERPL-CCNC: 135 U/L
ALT SERPL-CCNC: 29 U/L
ANION GAP SERPL CALC-SCNC: 5 MMOL/L (ref 0–18)
AST SERPL-CCNC: 14 U/L (ref 15–37)
BILIRUB SERPL-MCNC: 0.3 MG/DL (ref 0.1–2)
BUN BLD-MCNC: 12 MG/DL (ref 7–18)
BUN/CREAT SERPL: 19 (ref 10–20)
CALCIUM BLD-MCNC: 8.4 MG/DL (ref 8.5–10.1)
CHLORIDE SERPL-SCNC: 118 MMOL/L (ref 98–112)
CO2 SERPL-SCNC: 23 MMOL/L (ref 21–32)
CREAT BLD-MCNC: 0.63 MG/DL
GLOBULIN PLAS-MCNC: 2.5 G/DL (ref 2.8–4.4)
GLUCOSE BLD-MCNC: 125 MG/DL (ref 70–99)
GLUCOSE BLD-MCNC: 91 MG/DL (ref 70–99)
GLUCOSE BLD-MCNC: 91 MG/DL (ref 70–99)
GLUCOSE BLD-MCNC: 95 MG/DL (ref 70–99)
GLUCOSE BLD-MCNC: 96 MG/DL (ref 70–99)
LIPASE SERPL-CCNC: 170 U/L (ref 73–393)
M PROTEIN MFR SERPL ELPH: 4.7 G/DL (ref 6.4–8.2)
OSMOLALITY SERPL CALC.SUM OF ELEC: 302 MOSM/KG (ref 275–295)
POTASSIUM SERPL-SCNC: 3.8 MMOL/L (ref 3.5–5.1)
SODIUM SERPL-SCNC: 146 MMOL/L (ref 136–145)

## 2021-01-16 PROCEDURE — 99232 SBSQ HOSP IP/OBS MODERATE 35: CPT | Performed by: HOSPITALIST

## 2021-01-16 RX ORDER — POTASSIUM CHLORIDE 14.9 MG/ML
20 INJECTION INTRAVENOUS ONCE
Status: COMPLETED | OUTPATIENT
Start: 2021-01-16 | End: 2021-01-16

## 2021-01-16 RX ORDER — LEVOFLOXACIN 5 MG/ML
750 INJECTION, SOLUTION INTRAVENOUS EVERY 24 HOURS
Status: DISCONTINUED | OUTPATIENT
Start: 2021-01-16 | End: 2021-01-17

## 2021-01-16 NOTE — PLAN OF CARE
Patient A/O X 4, VSS, IVF infusing per orders. Patient receiving meds through g-tube as ordered. Patient medicated with tylenol with codeine for pain, zofran IVP for nausea. Tube feeding resumed at 60mL/hr, no residual. Goal rate 75mL/hr.  Patient would [facility] affect risk of falls.   - [place] fall precautions as indicated by assessment.  - Educate pt/family on patient safety including physical limitations  - Instruct pt to call for assistance with activity based on assessment  - Modify environment to reduce ri

## 2021-01-16 NOTE — CONSULTS
[facility] Pharmacy Note:  Renal Adjustment for levofloxacin (Mickie Donnelly)    Ciara [place] is a 79year old patient who has been prescribed levofloxacin (LEVAQUIN) 750 mg every 48 hrs.   CrCl is estimated creatinine clearance is 61.8 mL/min (based on SCr of 0.63

## 2021-01-16 NOTE — PROGRESS NOTES
[facility]  Progress Note     Kimberly Tomlinson Patient Status:  Inpatient    10/9/1940 MRN [id number]   [facility] 3NE-A Attending Ajit Santillan, DO    Hosp Day # 5 PCP Noni Escobar     Chief Complaint:  Blood around g tube  S 14*   ALT 58* 56  --   --  29   BILT 0.4 0.4  --   --  0.3   TP 6.1* 6.1*  --   --  4.7*     Estimated Creatinine Clearance: 61.8 mL/min (based on SCr of 0.63 mg/dL). No results for input(s): PTP, INR in the last 168 hours.   No results for input(s): TROP, 1/14 needs repeat next week   11. Hypotension -  Bp stable getting meds   12. Hypokalemia - replace / monitor   13.  Hypoglycemia-  D10  Decrease rate starting TF will need to monitor BS       Plan  Needs skin barrier to protect skin around g tube  Continue

## 2021-01-16 NOTE — PLAN OF CARE
Patient is A&O x4. Forgetful/Confused at times. R arm precautions. VSS. On tele-Afib/A paced. Oon RA. IV-0.9% NS 25 ml/hr. D10 @ 30 ml/hr. TF running advanced to 60 ml/hr @ 2030. G-tube residual <25 ml.  G-tube site dressing changed, C/D/I.    Brittney Manuel growth factors as ordered  - Implement neutropenic guidelines  Outcome: Progressing     Problem: SAFETY ADULT - FALL  Goal: Free from fall injury  Description: INTERVENTIONS:  - Assess pt frequently for physical needs  - Identify cognitive and physical def

## 2021-01-16 NOTE — PLAN OF CARE
Assumed pt care at 0730. Diet advanced, full liquid. IVF 0.9 25ml/hr, D10 30ml/hr. Resume elequis. IV Levaquin discontinued. Tube feeds osmolit 1.2 going 55 ml/hr, 60 ml flushes Q4, goal 75 ml/hr. Resume meds per g-tube.  Accuchecks Q6 to monitor blood sug needs  - Identify cognitive and physical deficits and behaviors that affect risk of falls.   - [place]wood fall precautions as indicated by assessment.  - Educate pt/family on patient safety including physical limitations  - Instruct pt to call for assistance

## 2021-01-17 VITALS
WEIGHT: 124.5 LBS | HEIGHT: 64.5 IN | DIASTOLIC BLOOD PRESSURE: 62 MMHG | TEMPERATURE: 98 F | BODY MASS INDEX: 21 KG/M2 | SYSTOLIC BLOOD PRESSURE: 130 MMHG | RESPIRATION RATE: 16 BRPM | HEART RATE: 78 BPM | OXYGEN SATURATION: 95 %

## 2021-01-17 LAB
GLUCOSE BLD-MCNC: 103 MG/DL (ref 70–99)
GLUCOSE BLD-MCNC: 78 MG/DL (ref 70–99)
POTASSIUM SERPL-SCNC: 4 MMOL/L (ref 3.5–5.1)

## 2021-01-17 PROCEDURE — 99239 HOSP IP/OBS DSCHRG MGMT >30: CPT | Performed by: HOSPITALIST

## 2021-01-17 NOTE — PHYSICAL THERAPY NOTE
PHYSICAL THERAPY TREATMENT NOTE - INPATIENT    Room Number: 5390/2624-M     Session: 3  Number of Visits to Meet Established Goals: 3    Presenting Problem: Gastrostomy complication    History related to current admission: Pt is 79year old female admitte • IR INTRA ARTERIAL STROKE INTERVENTION  10/26/2020        • LUMPECTOMY RIGHT         SUBJECTIVE  \" Between the pacemaker and the g tube, I'm just all parts\"    Patient’s self-stated goal is to return home .      OBJECTIVE  Precautions: Limb alert safe mobility, fall prevention and EC. Pt verbalizes understanding. Sup>sit supervision, HOB slightly elevated. Sit>stand supervision. Pt gait trained c RW and CGa, frequent cues for proper integration of RW within JOHNNIE c BLE in RW.  Good return albin charles modified independent      Goal #2 Patient is able to demonstrate transfers Sit to/from Stand at assistance level: modified independent      Goal #3 Patient is able to ambulate 150 feet with assist device: walker - standard at assistance level: supervision

## 2021-01-17 NOTE — PROGRESS NOTES
[facility]  Progress Note     Karen Ureña Patient Status:  Inpatient    10/9/1940 MRN [id number]   [facility] 3NE-A Attending Paige Portillo, DO    Hosp Day # 6 PCP Dilan Jones     Chief Complaint:  Blood around g tube  S --  23.0  --    ALKPHO 216* 215*  --   --  135  --    AST 32 31  --   --  14*  --    ALT 58* 56  --   --  29  --    BILT 0.4 0.4  --   --  0.3  --    TP 6.1* 6.1*  --   --  4.7*  --      Estimated Creatinine Clearance: 61.8 mL/min (based on SCr of 0.63 mg/ 2.  BB, CCB  3. AF today   9. COPD  10. Esophageal strictures - chronic G-tube  S/p dilation 1/14 needs repeat next week   11. Hypotension -  Bp stable getting meds   12. Hypokalemia - replace / monitor   13.  Hypoglycemia-  D10  Decrease rate starting TF

## 2021-01-17 NOTE — PLAN OF CARE
Assumed care of pt around 1915  A&O x4, forgetful at times, reoriented   [facility] OF [facility] on RA  Apaced on tele  Denies any pain at this time  Up with 1-2 assist  accu q6  Full liquid diet  Tube feedings, @60mL/hr, goal is 75- pt worried about rate and requesting low r Modify environment to reduce risk of injury  - Provide assistive devices as appropriate  - Consider OT/PT consult to assist with strengthening/mobility  - Encourage toileting schedule  Outcome: Progressing     Problem: DISCHARGE PLANNING  Goal: Discharge t

## 2021-01-17 NOTE — PLAN OF CARE
NURSING DISCHARGE NOTE    Discharged Home via Wheelchair. Accompanied by Support staff  Belongings Taken by patient/family. Tele removed. Extended PIV removed. Discharge paper work reviewed with patient and , both verbalized understanding.

## 2021-01-21 RX ORDER — AMIODARONE HYDROCHLORIDE 200 MG/1
200 TABLET ORAL 2 TIMES DAILY
Status: ON HOLD | COMMUNITY
Start: 2021-03-15 | End: 2021-08-18

## 2021-01-24 NOTE — DISCHARGE SUMMARY
[facility] HOSPITAL[facility]  DISCHARGE SUMMARY     Ez Santana Patient Status:  Inpatient    10/9/1940 MRN [id number]   [facility] 3NE-A Attending No att. providers found   Hosp Day # 6 PCP Jessica Rojas     Date of Admission: 1/10/2021  Date positive and patient was started on antibiotics after 2 days of antibiotics patient's mental status improved. Patient was switched to IV PPI twice a day. Patient's pain improved. Patient completed 5 days of Levaquin.   With vital stable patient toleratin Budesonide-Formoterol Fumarate 160-4.5 MCG/ACT Aero  Commonly known as: SYMBICORT      Inhale 2 puffs into the lungs 2 (two) times daily.    Refills: 0     gemfibrozil 600 MG Tabs  Commonly known as: LOPID      Take 300 mg by mouth 2 (two) times daily bef [employer] and enter the double doors located on the ground floor. &nbsp; Proceed to the lobby past the Information Desk to Outpatient Registration. Masks are required to be worn upon entering any [facility] facility.   [facility] AT [facility]

## 2021-01-25 ENCOUNTER — LAB ENCOUNTER (OUTPATIENT)
Dept: LAB | Facility: HOSPITAL | Age: 81
End: 2021-01-25
Attending: INTERNAL MEDICINE
Payer: MEDICARE

## 2021-01-25 DIAGNOSIS — Z01.818 PRE-OP TESTING: ICD-10-CM

## 2021-01-26 LAB — SARS-COV-2 RNA RESP QL NAA+PROBE: NOT DETECTED

## 2021-01-28 ENCOUNTER — ANESTHESIA (OUTPATIENT)
Dept: ENDOSCOPY | Facility: HOSPITAL | Age: 81
End: 2021-01-28
Payer: MEDICARE

## 2021-01-28 ENCOUNTER — HOSPITAL ENCOUNTER (OUTPATIENT)
Facility: HOSPITAL | Age: 81
Setting detail: HOSPITAL OUTPATIENT SURGERY
Discharge: HOME | End: 2021-01-28
Attending: INTERNAL MEDICINE | Admitting: INTERNAL MEDICINE
Payer: MEDICARE

## 2021-01-28 ENCOUNTER — ANESTHESIA EVENT (OUTPATIENT)
Dept: ENDOSCOPY | Facility: HOSPITAL | Age: 81
End: 2021-01-28
Payer: MEDICARE

## 2021-01-28 VITALS
DIASTOLIC BLOOD PRESSURE: 67 MMHG | OXYGEN SATURATION: 95 % | RESPIRATION RATE: 18 BRPM | TEMPERATURE: 99 F | HEART RATE: 81 BPM | HEIGHT: 64.5 IN | BODY MASS INDEX: 21.08 KG/M2 | SYSTOLIC BLOOD PRESSURE: 108 MMHG | WEIGHT: 125 LBS

## 2021-01-28 DIAGNOSIS — K83.9 BILE DUCT ABNORMALITY: ICD-10-CM

## 2021-01-28 DIAGNOSIS — R10.10 UPPER ABDOMINAL PAIN: ICD-10-CM

## 2021-01-28 DIAGNOSIS — Z01.818 PRE-OP TESTING: Primary | ICD-10-CM

## 2021-01-28 DIAGNOSIS — K94.20 GASTROSTOMY COMPLICATION (HCC): ICD-10-CM

## 2021-01-28 PROCEDURE — 0D758ZZ DILATION OF ESOPHAGUS, VIA NATURAL OR ARTIFICIAL OPENING ENDOSCOPIC: ICD-10-PCS | Performed by: INTERNAL MEDICINE

## 2021-01-28 RX ORDER — ONDANSETRON 2 MG/ML
4 INJECTION INTRAMUSCULAR; INTRAVENOUS AS NEEDED
Status: DISCONTINUED | OUTPATIENT
Start: 2021-01-28 | End: 2021-01-28

## 2021-01-28 RX ORDER — SODIUM CHLORIDE, SODIUM LACTATE, POTASSIUM CHLORIDE, CALCIUM CHLORIDE 600; 310; 30; 20 MG/100ML; MG/100ML; MG/100ML; MG/100ML
INJECTION, SOLUTION INTRAVENOUS CONTINUOUS
Status: DISCONTINUED | OUTPATIENT
Start: 2021-01-28 | End: 2021-01-28

## 2021-01-28 RX ORDER — HYDROCODONE BITARTRATE AND ACETAMINOPHEN 10; 325 MG/1; MG/1
2 TABLET ORAL AS NEEDED
Status: DISCONTINUED | OUTPATIENT
Start: 2021-01-28 | End: 2021-01-28

## 2021-01-28 RX ORDER — HYDROMORPHONE HYDROCHLORIDE 1 MG/ML
0.4 INJECTION, SOLUTION INTRAMUSCULAR; INTRAVENOUS; SUBCUTANEOUS EVERY 5 MIN PRN
Status: DISCONTINUED | OUTPATIENT
Start: 2021-01-28 | End: 2021-01-28

## 2021-01-28 RX ORDER — LIDOCAINE HYDROCHLORIDE 10 MG/ML
INJECTION, SOLUTION EPIDURAL; INFILTRATION; INTRACAUDAL; PERINEURAL AS NEEDED
Status: DISCONTINUED | OUTPATIENT
Start: 2021-01-28 | End: 2021-01-28 | Stop reason: SURG

## 2021-01-28 RX ORDER — METOCLOPRAMIDE HYDROCHLORIDE 5 MG/ML
10 INJECTION INTRAMUSCULAR; INTRAVENOUS AS NEEDED
Status: DISCONTINUED | OUTPATIENT
Start: 2021-01-28 | End: 2021-01-28

## 2021-01-28 RX ORDER — HYDROCODONE BITARTRATE AND ACETAMINOPHEN 10; 325 MG/1; MG/1
1 TABLET ORAL AS NEEDED
Status: DISCONTINUED | OUTPATIENT
Start: 2021-01-28 | End: 2021-01-28

## 2021-01-28 RX ORDER — NALOXONE HYDROCHLORIDE 0.4 MG/ML
80 INJECTION, SOLUTION INTRAMUSCULAR; INTRAVENOUS; SUBCUTANEOUS AS NEEDED
Status: DISCONTINUED | OUTPATIENT
Start: 2021-01-28 | End: 2021-01-28

## 2021-01-28 RX ADMIN — LIDOCAINE HYDROCHLORIDE 25 MG: 10 INJECTION, SOLUTION EPIDURAL; INFILTRATION; INTRACAUDAL; PERINEURAL at 13:14:00

## 2021-01-28 RX ADMIN — SODIUM CHLORIDE, SODIUM LACTATE, POTASSIUM CHLORIDE, CALCIUM CHLORIDE: 600; 310; 30; 20 INJECTION, SOLUTION INTRAVENOUS at 13:28:00

## 2021-01-28 NOTE — H&P
[facility] Status:  H[facility] Outpatient [facility]    10/9/1940 MRN [id number]   [facility] ENDOSCOPY Attending Fernando Conway MD   Date 2021 PCP Cyndee Ruiz     CC: Esophageal stri w this product    Medications:    Current Facility-Administered Medications:   •  lactated ringers infusion, , Intravenous, Continuous    Physical Exam:    Blood pressure 108/70, pulse 68, temperature 98 °F (36.7 °C), temperature source Temporal, resp.  rat

## 2021-01-28 NOTE — ANESTHESIA PREPROCEDURE EVALUATION
PRE-OP EVALUATION    Patient Name: Abdulkadir Merchant    Pre-op Diagnosis: Gastrostomy complication ([facility] [address] [G53.03]  Bile duct abnormality [K83.9]  Upper abdominal pain [R10.10]    Procedure(s):  ESOPHAGOGASTRODUODENOSCOPY (EGD) with dilation    Surgeon(s) an Rfl:     •  Montelukast Sodium 10 MG Oral Tab, Take 10 mg by mouth nightly., Disp: , Rfl:     •  Ondansetron HCl 4 MG/5ML Oral Solution, 4 mg by Enteral route., Disp: , Rfl:         Allergies: Penicillin G, Statins, Sulfa Antibiotics, and Warfarin      Ane normal.  Breath sounds clear to auscultation bilaterally. Other findings            ASA: 3   Plan: MAC  NPO status verified and patient meets guidelines.           Plan/risks discussed with: patient                Present on Admission:  **None

## 2021-01-28 NOTE — OPERATIVE REPORT
[facility] Patient Status:  Hospital Outpatient Surgery    10/9/1940 MRN [id number]   [facility] ENDOSCOPY Attending Faiza Fu MD   Date 2021 PCP Carlos Blanton     PREOPERATIVE DIAGNOSIS/INDICATION: Dysphagia, choled 12-13.5-15 mm mucosal inspection was performed after each dilatation diameter, superficial mucosal break noted only after 15 mm, no complications, perforation of bleeding noted  RECOMMENDATIONS:   · Post EGD dilatationprecautions, watch for bleeding, infec

## 2021-01-28 NOTE — ANESTHESIA POSTPROCEDURE EVALUATION
308 [facility] Patient Status:  Hospital Outpatient Surgery   Age/Gender 79year old female MRN [id number]   Location [address] [place] [address]. Attending Fernando Conway MD   Hosp Day # 0 PCP Cyndee Ruiz       Anesthesia Post-op

## 2021-02-08 RX ORDER — LOPERAMIDE HYDROCHLORIDE 2 MG/1
2 CAPSULE ORAL 4 TIMES DAILY PRN
COMMUNITY
End: 2021-03-19

## 2021-02-09 ENCOUNTER — LAB ENCOUNTER (OUTPATIENT)
Dept: LAB | Facility: HOSPITAL | Age: 81
End: 2021-02-09
Attending: INTERNAL MEDICINE
Payer: MEDICARE

## 2021-02-09 DIAGNOSIS — K85.90 ACUTE PANCREATITIS WITHOUT INFECTION OR NECROSIS, UNSPECIFIED PANCREATITIS TYPE: ICD-10-CM

## 2021-02-09 DIAGNOSIS — Z23 NEED FOR VACCINATION: ICD-10-CM

## 2021-02-10 LAB — SARS-COV-2 RNA RESP QL NAA+PROBE: NOT DETECTED

## 2021-02-11 ENCOUNTER — ANESTHESIA EVENT (OUTPATIENT)
Dept: ENDOSCOPY | Facility: HOSPITAL | Age: 81
End: 2021-02-11
Payer: MEDICARE

## 2021-02-11 ENCOUNTER — ANESTHESIA (OUTPATIENT)
Dept: ENDOSCOPY | Facility: HOSPITAL | Age: 81
End: 2021-02-11
Payer: MEDICARE

## 2021-02-11 ENCOUNTER — HOSPITAL ENCOUNTER (OUTPATIENT)
Facility: HOSPITAL | Age: 81
Setting detail: HOSPITAL OUTPATIENT SURGERY
Discharge: HOME | End: 2021-02-11
Attending: INTERNAL MEDICINE | Admitting: INTERNAL MEDICINE
Payer: MEDICARE

## 2021-02-11 VITALS
SYSTOLIC BLOOD PRESSURE: 92 MMHG | OXYGEN SATURATION: 95 % | HEART RATE: 80 BPM | RESPIRATION RATE: 20 BRPM | DIASTOLIC BLOOD PRESSURE: 53 MMHG | BODY MASS INDEX: 20.41 KG/M2 | HEIGHT: 65.5 IN | WEIGHT: 124 LBS | TEMPERATURE: 99 F

## 2021-02-11 DIAGNOSIS — K22.2 ESOPHAGEAL STRICTURE: ICD-10-CM

## 2021-02-11 DIAGNOSIS — K85.90 ACUTE PANCREATITIS WITHOUT INFECTION OR NECROSIS, UNSPECIFIED PANCREATITIS TYPE: Primary | ICD-10-CM

## 2021-02-11 PROCEDURE — 0D738ZZ DILATION OF LOWER ESOPHAGUS, VIA NATURAL OR ARTIFICIAL OPENING ENDOSCOPIC: ICD-10-PCS | Performed by: INTERNAL MEDICINE

## 2021-02-11 PROCEDURE — 0D718ZZ DILATION OF UPPER ESOPHAGUS, VIA NATURAL OR ARTIFICIAL OPENING ENDOSCOPIC: ICD-10-PCS | Performed by: INTERNAL MEDICINE

## 2021-02-11 RX ORDER — HYDROMORPHONE HYDROCHLORIDE 1 MG/ML
0.4 INJECTION, SOLUTION INTRAMUSCULAR; INTRAVENOUS; SUBCUTANEOUS EVERY 5 MIN PRN
Status: DISCONTINUED | OUTPATIENT
Start: 2021-02-11 | End: 2021-02-11

## 2021-02-11 RX ORDER — PHENYLEPHRINE HCL 10 MG/ML
VIAL (ML) INJECTION AS NEEDED
Status: DISCONTINUED | OUTPATIENT
Start: 2021-02-11 | End: 2021-02-11 | Stop reason: SURG

## 2021-02-11 RX ORDER — NALOXONE HYDROCHLORIDE 0.4 MG/ML
80 INJECTION, SOLUTION INTRAMUSCULAR; INTRAVENOUS; SUBCUTANEOUS AS NEEDED
Status: DISCONTINUED | OUTPATIENT
Start: 2021-02-11 | End: 2021-02-11

## 2021-02-11 RX ORDER — ONDANSETRON 2 MG/ML
4 INJECTION INTRAMUSCULAR; INTRAVENOUS AS NEEDED
Status: DISCONTINUED | OUTPATIENT
Start: 2021-02-11 | End: 2021-02-11

## 2021-02-11 RX ORDER — METOCLOPRAMIDE HYDROCHLORIDE 5 MG/ML
10 INJECTION INTRAMUSCULAR; INTRAVENOUS AS NEEDED
Status: DISCONTINUED | OUTPATIENT
Start: 2021-02-11 | End: 2021-02-11

## 2021-02-11 RX ORDER — HYDROCODONE BITARTRATE AND ACETAMINOPHEN 10; 325 MG/1; MG/1
1 TABLET ORAL AS NEEDED
Status: DISCONTINUED | OUTPATIENT
Start: 2021-02-11 | End: 2021-02-11

## 2021-02-11 RX ORDER — LIDOCAINE HYDROCHLORIDE 10 MG/ML
INJECTION, SOLUTION EPIDURAL; INFILTRATION; INTRACAUDAL; PERINEURAL AS NEEDED
Status: DISCONTINUED | OUTPATIENT
Start: 2021-02-11 | End: 2021-02-11 | Stop reason: SURG

## 2021-02-11 RX ORDER — SODIUM CHLORIDE, SODIUM LACTATE, POTASSIUM CHLORIDE, CALCIUM CHLORIDE 600; 310; 30; 20 MG/100ML; MG/100ML; MG/100ML; MG/100ML
INJECTION, SOLUTION INTRAVENOUS CONTINUOUS
Status: DISCONTINUED | OUTPATIENT
Start: 2021-02-11 | End: 2021-02-11

## 2021-02-11 RX ORDER — HYDROCODONE BITARTRATE AND ACETAMINOPHEN 10; 325 MG/1; MG/1
2 TABLET ORAL AS NEEDED
Status: DISCONTINUED | OUTPATIENT
Start: 2021-02-11 | End: 2021-02-11

## 2021-02-11 RX ADMIN — PHENYLEPHRINE HCL 100 MCG: 10 MG/ML VIAL (ML) INJECTION at 13:12:00

## 2021-02-11 RX ADMIN — PHENYLEPHRINE HCL 100 MCG: 10 MG/ML VIAL (ML) INJECTION at 13:14:00

## 2021-02-11 RX ADMIN — LIDOCAINE HYDROCHLORIDE 25 MG: 10 INJECTION, SOLUTION EPIDURAL; INFILTRATION; INTRACAUDAL; PERINEURAL at 13:05:00

## 2021-02-11 RX ADMIN — SODIUM CHLORIDE, SODIUM LACTATE, POTASSIUM CHLORIDE, CALCIUM CHLORIDE: 600; 310; 30; 20 INJECTION, SOLUTION INTRAVENOUS at 13:20:00

## 2021-02-11 NOTE — OPERATIVE REPORT
[facility] Patient Status:  H[facility]patient Surgery    10/9/1940 MRN [id number]   [facility] ENDOSCOPY Attending Kristie Hernandez MD   Date 2021 PCP Nima Jenkins     CC: Esophageal stri drugs. Allergies:    Penicillin G            RASH    Comment:Trouble bleeding.   Statins                 OTHER (SEE COMMENTS)    Comment:Myalgias  Sulfa Antibiotics       RASH  Warfarin                OTHER (SEE COMMENTS)    Comment:Known PAF

## 2021-02-11 NOTE — ANESTHESIA PREPROCEDURE EVALUATION
PRE-OP EVALUATION    Patient Name: Geoff Valenzuela    Pre-op Diagnosis: Esophageal stricture [K22.2]    Procedure(s):  ESOPHAGOGASTRODUODENOSCOPY with dilation    Surgeon(s) and Role:     Kamran Grissom MD - Primary    Pre-op vitals reviewed. Inhale 2 puffs into the lungs 2 (two) times daily. , Disp: , Rfl:     •  Montelukast Sodium 10 MG Oral Tab, Take 10 mg by mouth nightly., Disp: , Rfl:     •  sodium chloride 7 % Inhalation Nebu Soln, USE 2ML VIA NEBULIZER BID, Disp: , Rfl:         Allergies Airway      Mallampati: II  Mouth opening: 3 FB  TM distance: > 6 cm  Neck ROM: full Cardiovascular    Cardiovascular exam normal.  Rhythm: regular  Rate: normal     Dental    No notable dental history.          Pulmonary    Pulmonary exam normal.  Br

## 2021-02-11 NOTE — ANESTHESIA POSTPROCEDURE EVALUATION
308 [facility] Patient Status:  Hospital Outpatient Surgery   Age/Gender 79year old female MRN [id number]   Location [address] [place] [address]. Attending Juanita Fleming MD   Hosp Day # 0 PCP Camille Eli       Anesthesia Post-op

## 2021-02-11 NOTE — OPERATIVE REPORT
Kimberly Tomlinson Patient Status:  Hospital Outpatient Surgery    10/9/1940 MRN [id number]   [facility] ENDOSCOPY Attending Jaclyn Nye MD   Date 2021 PCP NAHUM Rosa     PREOPERATIVE DIAGNOSIS/INDICATION: Dysphagia, choled 12-13.5-15 mm mucosal inspection was performed after each dilatation diameter, superficial mucosal break noted only UWTQS 72 SY, no complications, perforation of bleeding noted  RECOMMENDATIONS:   · Post EGD dilatation precautions, watch for bleeding, infe

## 2021-02-12 ENCOUNTER — TELEPHONE (OUTPATIENT)
Dept: SURGERY | Facility: CLINIC | Age: 81
End: 2021-02-12

## 2021-02-12 NOTE — TELEPHONE ENCOUNTER
Received TEODORA procedure record from patient's cervicocerebral angiography and R MCA embolectomy with GAURD technique for stroke intervention procedure on 10-26-20 with Dr Jamir Montilla.   Patient had follow up appointment with Dr Jamir Montilla on 1-4-21 (she had been

## 2021-02-14 NOTE — H&P
[facility]:  [facility]    10/9/1940 MRN [id number]   [facility] ENDOSCOPY Attending No att. providers found   Date 2021 PCP NAHUM Jaimes     CC: Esophageal st drugs. Allergies:    Penicillin G            RASH    Comment:Trouble bleeding.   Statins                 OTHER (SEE COMMENTS)    Comment:Myalgias  Sulfa Antibiotics       RASH  Warfarin                OTHER (SEE COMMENTS)    Comment:Known PAF

## 2021-02-15 RX ORDER — SODIUM CHLORIDE, SODIUM LACTATE, POTASSIUM CHLORIDE, CALCIUM CHLORIDE 600; 310; 30; 20 MG/100ML; MG/100ML; MG/100ML; MG/100ML
INJECTION, SOLUTION INTRAVENOUS CONTINUOUS
Status: CANCELLED | OUTPATIENT
Start: 2021-02-15

## 2021-02-23 ENCOUNTER — LAB ENCOUNTER (OUTPATIENT)
Dept: LAB | Facility: HOSPITAL | Age: 81
End: 2021-02-23
Attending: INTERNAL MEDICINE
Payer: MEDICARE

## 2021-02-23 DIAGNOSIS — K22.2 ESOPHAGEAL STRICTURE: ICD-10-CM

## 2021-02-24 LAB — SARS-COV-2 RNA RESP QL NAA+PROBE: NOT DETECTED

## 2021-02-25 ENCOUNTER — HOSPITAL ENCOUNTER (OUTPATIENT)
Facility: HOSPITAL | Age: 81
Setting detail: HOSPITAL OUTPATIENT SURGERY
Discharge: HOME | End: 2021-02-25
Attending: INTERNAL MEDICINE | Admitting: INTERNAL MEDICINE
Payer: MEDICARE

## 2021-02-25 ENCOUNTER — ANESTHESIA EVENT (OUTPATIENT)
Dept: ENDOSCOPY | Facility: HOSPITAL | Age: 81
End: 2021-02-25
Payer: MEDICARE

## 2021-02-25 ENCOUNTER — ANESTHESIA (OUTPATIENT)
Dept: ENDOSCOPY | Facility: HOSPITAL | Age: 81
End: 2021-02-25
Payer: MEDICARE

## 2021-02-25 VITALS
BODY MASS INDEX: 20.66 KG/M2 | HEART RATE: 59 BPM | HEIGHT: 64 IN | TEMPERATURE: 97 F | WEIGHT: 121 LBS | RESPIRATION RATE: 15 BRPM | OXYGEN SATURATION: 100 % | DIASTOLIC BLOOD PRESSURE: 59 MMHG | SYSTOLIC BLOOD PRESSURE: 110 MMHG

## 2021-02-25 DIAGNOSIS — K22.2 ESOPHAGEAL STRICTURE: Primary | ICD-10-CM

## 2021-02-25 LAB — GLUCOSE BLD-MCNC: 89 MG/DL (ref 70–99)

## 2021-02-25 PROCEDURE — 82962 GLUCOSE BLOOD TEST: CPT

## 2021-02-25 PROCEDURE — 0D748ZZ DILATION OF ESOPHAGOGASTRIC JUNCTION, VIA NATURAL OR ARTIFICIAL OPENING ENDOSCOPIC: ICD-10-PCS | Performed by: INTERNAL MEDICINE

## 2021-02-25 RX ORDER — NALOXONE HYDROCHLORIDE 0.4 MG/ML
80 INJECTION, SOLUTION INTRAMUSCULAR; INTRAVENOUS; SUBCUTANEOUS AS NEEDED
Status: DISCONTINUED | OUTPATIENT
Start: 2021-02-25 | End: 2021-02-25

## 2021-02-25 RX ORDER — LIDOCAINE HYDROCHLORIDE 10 MG/ML
INJECTION, SOLUTION EPIDURAL; INFILTRATION; INTRACAUDAL; PERINEURAL AS NEEDED
Status: DISCONTINUED | OUTPATIENT
Start: 2021-02-25 | End: 2021-02-25 | Stop reason: SURG

## 2021-02-25 RX ORDER — SODIUM CHLORIDE, SODIUM LACTATE, POTASSIUM CHLORIDE, CALCIUM CHLORIDE 600; 310; 30; 20 MG/100ML; MG/100ML; MG/100ML; MG/100ML
INJECTION, SOLUTION INTRAVENOUS CONTINUOUS
Status: DISCONTINUED | OUTPATIENT
Start: 2021-02-25 | End: 2021-02-25

## 2021-02-25 RX ADMIN — LIDOCAINE HYDROCHLORIDE 50 MG: 10 INJECTION, SOLUTION EPIDURAL; INFILTRATION; INTRACAUDAL; PERINEURAL at 14:27:00

## 2021-02-25 NOTE — OPERATIVE REPORT
Aidalittle Perera Patient Status:  Hospital Outpatient Surgery    10/9/1940 MRN [id number]   [facility] ENDOSCOPY Attending Laci Pace MD   Date 2021 PCP NAHUM Leal     PREOPERATIVE DIAGNOSIS/INDICATION: Dysphagia, choled mucosal inspection was performed after each dilatation diameter, superficial mucosal break noted only FWWLL 79 OL, no complications, perforation of bleeding noted  RECOMMENDATIONS:   · Post EGD dilatation precautions, watch for bleeding, infection, perfora

## 2021-02-25 NOTE — ANESTHESIA PREPROCEDURE EVALUATION
PRE-OP EVALUATION    Patient Name: Aashish Yang    Pre-op Diagnosis: Esophageal stricture [K22.2]    Procedure(s):  ESOPHAGOGASTRODUODENOSCOPY (EGD) with dilation    Surgeon(s) and Role:     Maddie Barragan MD - Primary    Pre-op vitals reviewed. Tartrate 25 MG Oral Tab, Take 1 tablet (25 mg total) by mouth 2x Daily(Beta Blocker). (Patient taking differently: Take 50 mg by mouth 2x Daily(Beta Blocker).   ), Disp: 60 tablet, Rfl: 0, 2/25/2021 at Unknown time    •  Budesonide-Formoterol Fumarate 160-4 Alcohol use: Not Currently      Drug use: Unknown     Available pre-op labs reviewed.   Lab Results   Component Value Date    WBC 6.1 01/15/2021    RBC 4.49 01/15/2021    HGB 14.1 01/15/2021    HCT 43.9 01/15/2021    MCV 97.8 01/15/2021    MCH 31.4 01/15/20

## 2021-02-25 NOTE — H&P
[facility] Patient Status:  H[facility]patient Surgery    10/9/1940 MRN [id number]   [facility] ENDOSCOPY Attending Angelito Treadwell MD   Date 2021 PCP Cale Gresham     CC: Esophageal stri smoking. She has never used smokeless tobacco. She reports previous alcohol use. She reports that she does not use drugs. Allergies:    Penicillin G            RASH    Comment:Trouble bleeding.   Statins                 OTHER (SEE COMMENTS)    Comment:My Lashae  2/25/2021  2:17 PM

## 2021-02-25 NOTE — ANESTHESIA POSTPROCEDURE EVALUATION
308 [facility] Patient Status:  Hospital Outpatient Surgery   Age/Gender 79year old female MRN [id number]   Location [address] [facility]. Attending Gumaro Maguire MD   Hosp Day # 0 PCP Lobo Downs       Anesthesia Post-op

## 2021-03-02 RX ORDER — SODIUM CHLORIDE, SODIUM LACTATE, POTASSIUM CHLORIDE, CALCIUM CHLORIDE 600; 310; 30; 20 MG/100ML; MG/100ML; MG/100ML; MG/100ML
INJECTION, SOLUTION INTRAVENOUS CONTINUOUS
Status: CANCELLED | OUTPATIENT
Start: 2021-03-02

## 2021-03-10 ENCOUNTER — OFFICE VISIT (OUTPATIENT)
Dept: NEUROLOGY | Facility: CLINIC | Age: 81
End: 2021-03-10
Payer: MEDICARE

## 2021-03-10 VITALS
DIASTOLIC BLOOD PRESSURE: 60 MMHG | BODY MASS INDEX: 20 KG/M2 | RESPIRATION RATE: 14 BRPM | SYSTOLIC BLOOD PRESSURE: 122 MMHG | HEART RATE: 68 BPM | WEIGHT: 121 LBS

## 2021-03-10 DIAGNOSIS — Z86.73 HISTORY OF STROKE: Primary | ICD-10-CM

## 2021-03-10 PROCEDURE — 99213 OFFICE O/P EST LOW 20 MIN: CPT | Performed by: OTHER

## 2021-03-17 NOTE — ICD/PM
Patient has a [facility] PPM and V paces 14%. No changes need to be made for patient's upcoming ERCP. Grounding pad to opposite thigh if cautery is used. Routine follow up care.

## 2021-03-19 ENCOUNTER — HOSPITAL ENCOUNTER (EMERGENCY)
Facility: HOSPITAL | Age: 81
Discharge: HOME | End: 2021-03-19
Attending: EMERGENCY MEDICINE
Payer: MEDICARE

## 2021-03-19 VITALS
RESPIRATION RATE: 14 BRPM | OXYGEN SATURATION: 97 % | SYSTOLIC BLOOD PRESSURE: 100 MMHG | HEART RATE: 62 BPM | BODY MASS INDEX: 20.49 KG/M2 | DIASTOLIC BLOOD PRESSURE: 60 MMHG | HEIGHT: 64 IN | WEIGHT: 120 LBS

## 2021-03-19 DIAGNOSIS — T85.598A FEEDING TUBE DYSFUNCTION, INITIAL ENCOUNTER: Primary | ICD-10-CM

## 2021-03-19 PROCEDURE — 99283 EMERGENCY DEPT VISIT LOW MDM: CPT

## 2021-03-19 PROCEDURE — 43762 RPLC GTUBE NO REVJ TRC: CPT

## 2021-03-19 NOTE — ED PROVIDER NOTES
Patient Seen in: [facility] Emergency Department      History   Patient presents with:  Cath Tube Problem    Stated Complaint: top of feeding tube broke off    HPI/Subjective:   HPI    43-year-old female presents emergency department who broke the t N/A 1/14/2021    Performed by Jose Cruz Logan MD at [facility] ENDOSCOPY   • IR ANGIOGRAM CEREBRAL CAROTID UNILATERAL  10/26/2020        • IR INTRA ARTERIAL STROKE INTERVENTION  10/26/2020        • LUMPECTOMY RIGHT     • OTHER      surgery to remove cyst in thr rub  Respiratory: Clear to auscultation bilaterally no crackles no wheezes no accessory muscle use  Abdomen: Soft nontender nondistended, no rebound no guarding  no hepatosplenomegaly bowel sounds are present , no pulsatile mass, feeding tube top is broken

## 2021-03-19 NOTE — ED INITIAL ASSESSMENT (HPI)
Pt here stating top of feeding tube broke off yesterday. Pt was supposed to have it changed next Thursday. Pt is unable to swallow x 20years.  Denies vomiting pt was able to use it this morning with difficulty

## 2021-03-19 NOTE — ED NOTES
Patient is resting comfortably NAD . md discontinued g-tube.  26fr g-tube to bs . md placed new tube to LLQ without difficulty

## 2021-03-22 ENCOUNTER — LAB ENCOUNTER (OUTPATIENT)
Dept: LAB | Facility: HOSPITAL | Age: 81
End: 2021-03-22
Attending: INTERNAL MEDICINE
Payer: MEDICARE

## 2021-03-22 DIAGNOSIS — K85.90 ACUTE PANCREATITIS WITHOUT INFECTION OR NECROSIS, UNSPECIFIED PANCREATITIS TYPE: ICD-10-CM

## 2021-03-23 LAB — SARS-COV-2 RNA RESP QL NAA+PROBE: NOT DETECTED

## 2021-03-25 ENCOUNTER — ANESTHESIA (OUTPATIENT)
Dept: ENDOSCOPY | Facility: HOSPITAL | Age: 81
End: 2021-03-25
Payer: MEDICARE

## 2021-03-25 ENCOUNTER — HOSPITAL ENCOUNTER (OUTPATIENT)
Facility: HOSPITAL | Age: 81
Setting detail: HOSPITAL OUTPATIENT SURGERY
Discharge: HOME | End: 2021-03-25
Attending: INTERNAL MEDICINE | Admitting: INTERNAL MEDICINE
Payer: MEDICARE

## 2021-03-25 ENCOUNTER — APPOINTMENT (OUTPATIENT)
Dept: GENERAL RADIOLOGY | Facility: HOSPITAL | Age: 81
End: 2021-03-25
Attending: INTERNAL MEDICINE
Payer: MEDICARE

## 2021-03-25 ENCOUNTER — ANESTHESIA EVENT (OUTPATIENT)
Dept: ENDOSCOPY | Facility: HOSPITAL | Age: 81
End: 2021-03-25
Payer: MEDICARE

## 2021-03-25 VITALS
SYSTOLIC BLOOD PRESSURE: 130 MMHG | HEIGHT: 64 IN | TEMPERATURE: 99 F | OXYGEN SATURATION: 98 % | BODY MASS INDEX: 20.66 KG/M2 | RESPIRATION RATE: 20 BRPM | HEART RATE: 58 BPM | DIASTOLIC BLOOD PRESSURE: 68 MMHG | WEIGHT: 121 LBS

## 2021-03-25 DIAGNOSIS — K85.90 ACUTE PANCREATITIS WITHOUT INFECTION OR NECROSIS, UNSPECIFIED PANCREATITIS TYPE: Primary | ICD-10-CM

## 2021-03-25 DIAGNOSIS — K22.2 ESOPHAGEAL STRICTURE: ICD-10-CM

## 2021-03-25 DIAGNOSIS — K85.90 ACUTE PANCREATITIS, UNSPECIFIED COMPLICATION STATUS, UNSPECIFIED PANCREATITIS TYPE: ICD-10-CM

## 2021-03-25 PROCEDURE — 0D718ZZ DILATION OF UPPER ESOPHAGUS, VIA NATURAL OR ARTIFICIAL OPENING ENDOSCOPIC: ICD-10-PCS | Performed by: INTERNAL MEDICINE

## 2021-03-25 PROCEDURE — 0FC98ZZ EXTIRPATION OF MATTER FROM COMMON BILE DUCT, VIA NATURAL OR ARTIFICIAL OPENING ENDOSCOPIC: ICD-10-PCS | Performed by: INTERNAL MEDICINE

## 2021-03-25 PROCEDURE — 74328 X-RAY BILE DUCT ENDOSCOPY: CPT | Performed by: INTERNAL MEDICINE

## 2021-03-25 PROCEDURE — 0FC78ZZ EXTIRPATION OF MATTER FROM COMMON HEPATIC DUCT, VIA NATURAL OR ARTIFICIAL OPENING ENDOSCOPIC: ICD-10-PCS | Performed by: INTERNAL MEDICINE

## 2021-03-25 PROCEDURE — 0F798ZZ DILATION OF COMMON BILE DUCT, VIA NATURAL OR ARTIFICIAL OPENING ENDOSCOPIC: ICD-10-PCS | Performed by: INTERNAL MEDICINE

## 2021-03-25 PROCEDURE — 0D738ZZ DILATION OF LOWER ESOPHAGUS, VIA NATURAL OR ARTIFICIAL OPENING ENDOSCOPIC: ICD-10-PCS | Performed by: INTERNAL MEDICINE

## 2021-03-25 RX ORDER — LIDOCAINE HYDROCHLORIDE 10 MG/ML
INJECTION, SOLUTION EPIDURAL; INFILTRATION; INTRACAUDAL; PERINEURAL AS NEEDED
Status: DISCONTINUED | OUTPATIENT
Start: 2021-03-25 | End: 2021-03-25 | Stop reason: SURG

## 2021-03-25 RX ORDER — SODIUM CHLORIDE, SODIUM LACTATE, POTASSIUM CHLORIDE, CALCIUM CHLORIDE 600; 310; 30; 20 MG/100ML; MG/100ML; MG/100ML; MG/100ML
INJECTION, SOLUTION INTRAVENOUS CONTINUOUS
Status: DISCONTINUED | OUTPATIENT
Start: 2021-03-25 | End: 2021-03-25

## 2021-03-25 RX ADMIN — LIDOCAINE HYDROCHLORIDE 25 MG: 10 INJECTION, SOLUTION EPIDURAL; INFILTRATION; INTRACAUDAL; PERINEURAL at 13:56:00

## 2021-03-25 RX ADMIN — SODIUM CHLORIDE, SODIUM LACTATE, POTASSIUM CHLORIDE, CALCIUM CHLORIDE: 600; 310; 30; 20 INJECTION, SOLUTION INTRAVENOUS at 14:24:00

## 2021-03-25 RX ADMIN — SODIUM CHLORIDE, SODIUM LACTATE, POTASSIUM CHLORIDE, CALCIUM CHLORIDE: 600; 310; 30; 20 INJECTION, SOLUTION INTRAVENOUS at 13:52:00

## 2021-03-25 NOTE — OPERATIVE REPORT
Leoraruma Trever Patient Status:  Hospital Outpatient Surgery    10/9/1940 MRN [id number]   [facility] ENDOSCOPY Attending Laci Pace MD   Date 3/25/2021 PCP Smooth Schaefer     PREOPERATIVE DIAGNOSIS/INDICATION: Abnormal LFT's, d jagwire 260cm in length, an adequate biliary sphincterotomy was performed with standard ERBE settings, there were no immediate complication noted.  Biliary balloon stone extraction was performed the help of [facility] [employer]’s 9-12-15-18 mm triple lumen sto

## 2021-03-25 NOTE — ANESTHESIA PREPROCEDURE EVALUATION
PRE-OP EVALUATION    Patient Name: Terrence Daniels    Admit Diagnosis: Acute pancreatitis, unspecified complication status, unspecified pancreatitis type [K85.90]  Esophageal stricture [K22.2]    Pre-op Diagnosis: Acute pancreatitis, unspecified complicat Aerosol, Inhale 2 puffs into the lungs 2 (two) times daily. , Disp: , Rfl:   Montelukast Sodium 10 MG Oral Tab, Take 10 mg by mouth nightly., Disp: , Rfl:   nystatin [id number] UNIT/GM External Cream, Apply 1 Application topically 2 (two) times daily.  APPLY TO HCT 43.9 01/15/2021    MCV 97.8 01/15/2021    MCH 31.4 01/15/2021    MCHC 32.1 01/15/2021    RDW 12.7 01/15/2021    .0 01/15/2021     Lab Results   Component Value Date     (H) 01/16/2021    K 4.0 01/17/2021     (H) 01/16/2021    CO2

## 2021-03-25 NOTE — H&P
[facility]:  [facility]    10/9/1940 MRN [id number]   [facility] ENDOSCOPY Attending Severo Posey, MD   Date 3/25/2021 PCP Sanket Goode     CC: Esophageal stri surgery to remove cyst in throat 1980's   • UPPER GI ENDOSCOPY,EXAM       History reviewed. No pertinent family history. reports that she has quit smoking. She has never used smokeless tobacco. She reports previous alcohol use.  She reports that she do duct abnormality     Upper abdominal pain     Acute pancreatitis without infection or necrosis, unspecified pancreatitis type     Hallucinations      Esophageal stricture and choledocholithiasis    Plan;  EGD esophageal balloon dilatation and ERCP    Frankie

## 2021-03-25 NOTE — OPERATIVE REPORT
Heriberto Brown Patient Status:  Hospital Outpatient Surgery    10/9/1940 MRN [id number]   [facility] ENDOSCOPY Attending Gumaro Maguire MD   Date 3/25/2021 PCP NAHUM Small     PREOPERATIVE DIAGNOSIS/INDICATION: Dysphagia, choled diameter, no  mucosal break noted, no complications, perforation of bleeding noted  RECOMMENDATIONS:   · Post EGD dilatation precautions, watch for bleeding, infection, perforation, adverse drug reactions   · PPI's daily  · Clear liquid diet x 24 hrs, then

## 2021-03-25 NOTE — ANESTHESIA POSTPROCEDURE EVALUATION
308 [facility] Patient Status:  Hospital Outpatient Surgery   Age/Gender 79year old female MRN [id number]   Location [address] [place] [address]. Attending Jose Cruz Logan MD   Hosp Day # 0 PCP Oliva Cortez       Anesthesia Post-op

## 2021-04-19 ENCOUNTER — HOSPITAL ENCOUNTER (OUTPATIENT)
Facility: HOSPITAL | Age: 81
Setting detail: OBSERVATION
Discharge: HOME | End: 2021-04-20
Attending: EMERGENCY MEDICINE | Admitting: HOSPITALIST
Payer: MEDICARE

## 2021-04-19 ENCOUNTER — APPOINTMENT (OUTPATIENT)
Dept: CT IMAGING | Facility: HOSPITAL | Age: 81
End: 2021-04-19
Attending: EMERGENCY MEDICINE
Payer: MEDICARE

## 2021-04-19 DIAGNOSIS — R63.4 WEIGHT LOSS, UNINTENTIONAL: ICD-10-CM

## 2021-04-19 DIAGNOSIS — K80.50 CHOLEDOCHOLITHIASIS: ICD-10-CM

## 2021-04-19 DIAGNOSIS — R11.15 EMESIS, PERSISTENT: Primary | ICD-10-CM

## 2021-04-19 PROCEDURE — 99220 INITIAL OBSERVATION CARE,LEVL III: CPT | Performed by: HOSPITALIST

## 2021-04-19 PROCEDURE — 74176 CT ABD & PELVIS W/O CONTRAST: CPT | Performed by: EMERGENCY MEDICINE

## 2021-04-19 RX ORDER — MORPHINE SULFATE 2 MG/ML
1 INJECTION, SOLUTION INTRAMUSCULAR; INTRAVENOUS EVERY 4 HOURS PRN
Status: DISCONTINUED | OUTPATIENT
Start: 2021-04-19 | End: 2021-04-20

## 2021-04-19 RX ORDER — ONDANSETRON 4 MG/1
4 TABLET, FILM COATED ORAL EVERY 4 HOURS PRN
COMMUNITY

## 2021-04-19 RX ORDER — ACETAMINOPHEN AND CODEINE PHOSPHATE 120; 12 MG/5ML; MG/5ML
SOLUTION ORAL 3 TIMES DAILY PRN
Status: DISCONTINUED | OUTPATIENT
Start: 2021-04-19 | End: 2021-04-19 | Stop reason: SDUPTHER

## 2021-04-19 RX ORDER — ACETAMINOPHEN 325 MG/1
650 TABLET ORAL EVERY 6 HOURS PRN
Status: DISCONTINUED | OUTPATIENT
Start: 2021-04-19 | End: 2021-04-20

## 2021-04-19 RX ORDER — ENOXAPARIN SODIUM 100 MG/ML
40 INJECTION SUBCUTANEOUS DAILY
Status: DISCONTINUED | OUTPATIENT
Start: 2021-04-19 | End: 2021-04-20

## 2021-04-19 RX ORDER — ACETAMINOPHEN AND CODEINE PHOSPHATE 120; 12 MG/5ML; MG/5ML
5 SOLUTION ORAL 3 TIMES DAILY PRN
Status: DISCONTINUED | OUTPATIENT
Start: 2021-04-19 | End: 2021-04-20

## 2021-04-19 RX ORDER — GEMFIBROZIL 600 MG/1
300 TABLET, FILM COATED ORAL 2 TIMES DAILY
Status: DISCONTINUED | OUTPATIENT
Start: 2021-04-19 | End: 2021-04-20

## 2021-04-19 RX ORDER — DILTIAZEM HYDROCHLORIDE 60 MG/1
60 TABLET, FILM COATED ORAL EVERY 8 HOURS SCHEDULED
Status: DISCONTINUED | OUTPATIENT
Start: 2021-04-19 | End: 2021-04-20

## 2021-04-19 RX ORDER — ONDANSETRON 2 MG/ML
4 INJECTION INTRAMUSCULAR; INTRAVENOUS EVERY 6 HOURS PRN
Status: DISCONTINUED | OUTPATIENT
Start: 2021-04-19 | End: 2021-04-19

## 2021-04-19 RX ORDER — METOPROLOL TARTRATE 50 MG/1
50 TABLET, FILM COATED ORAL 2 TIMES DAILY
Status: DISCONTINUED | OUTPATIENT
Start: 2021-04-19 | End: 2021-04-20

## 2021-04-19 RX ORDER — METOPROLOL TARTRATE 50 MG/1
50 TABLET, FILM COATED ORAL 2 TIMES DAILY
Status: ON HOLD | COMMUNITY
End: 2021-08-14

## 2021-04-19 RX ORDER — AMIODARONE HYDROCHLORIDE 200 MG/1
200 TABLET ORAL 2 TIMES DAILY
Status: DISCONTINUED | OUTPATIENT
Start: 2021-04-19 | End: 2021-04-20

## 2021-04-19 RX ORDER — DILTIAZEM HYDROCHLORIDE 60 MG/1
60 TABLET, FILM COATED ORAL 4 TIMES DAILY
COMMUNITY
Start: 2021-01-06

## 2021-04-19 RX ORDER — ACETAMINOPHEN AND CODEINE PHOSPHATE 120; 12 MG/5ML; MG/5ML
10 SOLUTION ORAL 3 TIMES DAILY PRN
Status: DISCONTINUED | OUTPATIENT
Start: 2021-04-19 | End: 2021-04-20

## 2021-04-19 RX ORDER — MONTELUKAST SODIUM 10 MG/1
10 TABLET ORAL NIGHTLY
Status: DISCONTINUED | OUTPATIENT
Start: 2021-04-19 | End: 2021-04-20

## 2021-04-19 RX ORDER — SODIUM CHLORIDE 9 MG/ML
INJECTION, SOLUTION INTRAVENOUS CONTINUOUS
Status: DISCONTINUED | OUTPATIENT
Start: 2021-04-19 | End: 2021-04-20

## 2021-04-19 NOTE — ED PROVIDER NOTES
Patient Seen in: [facility] Emergency Department      History   Patient presents with:  Nausea/Vomiting/Diarrhea    Stated Complaint: N/V    HPI/Subjective:   HPI    Patient with a history of swallowing problems, G-tube for over 2 decades, esophagea model;[id number];serial#[id number] dual chamber   • IR ANGIOGRAM CEREBRAL CAROTID UNILATERAL  10/26/2020        • IR INTRA ARTERIAL STROKE INTERVENTION  10/26/2020        • LUMPECTOMY RIGHT     • OTHER      surgery to remove cyst in throat 1980's   • UPPER GI END GFR, Non- 45 (*)     GFR, -American 52 (*)     All other components within normal limits   URINALYSIS WITH CULTURE REFLEX - Abnormal; Notable for the following components:    Clarity Urine Hazy (*)     Protein Urine 30  (*)     Sq liquids given through the G-tube.   She will be admitted for IV hydration, supportive care, further work-up by GI      Admission disposition: 4/19/2021  4:23 PM                                  Disposition and Plan     Clinical Impression:  Emesis, persiste

## 2021-04-19 NOTE — H&P
[facility]  History and Physical     Karen Ureña Patient Status:  Emergency    10/9/1940 MRN [id number]   Location [address] [facility] Attending Brenda Boyle, [facility] Se Day # 0 PCP Dilan Jones     Chief Complaint: has never used smokeless tobacco. She reports previous alcohol use. She reports that she does not use drugs. Family History: Denies family history of diabetes. Allergies:   Penicillin G            RASH    Comment:Trouble bleeding.   Statins 125/65   Pulse 60   Temp 97.6 °F (36.4 °C) (Temporal)   Resp 15   Ht 166.4 cm (5' 5.5\")   Wt 120 lb (54.4 kg)   SpO2 99%   BMI 19.67 kg/m²   General: No acute distress. Alert and oriented x 3. HEENT: Normocephalic atraumatic. Moist mucous membranes.    Ne anti-emetics  5. GI consulted  6. CT without acute findings  2. Esophageal stricture s/p G-tube and prior esophageal dilatation   3. Leakage around PEG site  1. Reported by patient, not present at this time  4. Dilated CBD  1.  Noted on CT but improved comp

## 2021-04-19 NOTE — ED INITIAL ASSESSMENT (HPI)
Pt here due to N/V. The nausea has been going on for a few weeks. Pt started vomiting today. Pt has been trying to get hold of Dr. Acosta Carlos but could not get hold of him.

## 2021-04-19 NOTE — CONSULTS
[facility]                       [facility]enter[facility] Consultation-[facility]    [facility] Patient Status:  Emergency    10/9/1940 MRN [id number]   Location [address] [facility] [address] Attending Hipolito Cody, [facility] therapy- using walker but wheelchair if long distances     PSHx:   Past Surgical History:   Procedure Laterality Date   • CARDIAC PACEMAKER PLACEMENT  12/18/2013    [facility] model;[id number];serial#[id number] dual chamber   • IR ANGIOGRAM CEREBRAL CAROTID UNILATER reports no hoarseness of voice, hearing loss, sinus congestion, tinnitus           Neurologic: + CVA  PE: /65   Pulse 60   Temp 97.6 °F (36.4 °C) (Temporal)   Resp 15   Ht 5' 5.5\" (1.664 m)   Wt 120 lb (54.4 kg)   SpO2 99%   BMI 19.67 kg/m²   Gen: A G-tube dependency for 25+ yrs who presented to ER today with nausea and bilious vomiting in setting of recent non-bloody diarrhea. No fevers or chills, only abd pain near G-tube gastrostomy. Labs unremarkable, CT a/p pending.  Will r/o acute infection and c

## 2021-04-20 VITALS
TEMPERATURE: 98 F | OXYGEN SATURATION: 97 % | HEIGHT: 65.5 IN | DIASTOLIC BLOOD PRESSURE: 49 MMHG | HEART RATE: 69 BPM | WEIGHT: 120 LBS | BODY MASS INDEX: 19.75 KG/M2 | RESPIRATION RATE: 18 BRPM | SYSTOLIC BLOOD PRESSURE: 106 MMHG

## 2021-04-20 PROCEDURE — 99217 OBSERVATION CARE DISCHARGE: CPT | Performed by: HOSPITALIST

## 2021-04-20 NOTE — PROGRESS NOTES
[facility]  Progress Note    Maddi Dia Patient Status:  Observation    10/9/1940 MRN [id number]   [facility] 7NE-A Attending Carlos Hoff MD   Date 2021 PCP Flako Gar     Subjective:  Maddi Dia is a(n) 79 y Acute pancreatitis without infection or necrosis, unspecified pancreatitis type     Hallucinations     Emesis, persistent     Esophageal stricture     Cyst of ovary     Cerebrovascular disease     Choledocholithiasis     Weight loss, unintentional      Imp

## 2021-04-20 NOTE — PROGRESS NOTES
[facility]  Progress Note     Raysa Valle Patient Status:  Observation    10/9/1940 MRN [id number]   [facility] 7NE-A Attending Samantha Griffith MD   Hosp Day # 0 PCP Galen Ohara     Chief Complaint: nausea, vomiting    S: Pa 02/23/2021       Pro-Calcitonin  No results for input(s): PCT in the last 168 hours. Cardiac  No results for input(s): TROP, PBNP in the last 168 hours. Creatinine Kinase  No results for input(s): CK in the last 168 hours.     Inflammatory Markers  No

## 2021-04-20 NOTE — SLP NOTE
ADULT SWALLOWING EVALUATION    ASSESSMENT    ASSESSMENT/OVERALL IMPRESSION:  Patient is an 79year-old female who was admitted 4/19/2021 with c/o nausea the last few weeks and bile emesis which began the day prior to admission.  Patient has been PEG tube de on a thin liquid diet (No straw) and PEG tube feedings. Recommend that patient continue receiving dysphagia therapy with weekly home health SLP. Patient at baseline PO diet and politely declined soft solids during patient encounter.  No further SLP treatmen swallow. \"    SWALLOWING HISTORY  Current Diet Consistency: Thin liquids  Dysphagia History: See above  Imaging Results: No speech related imaging completed during current admission.     SUBJECTIVE       OBJECTIVE   ORAL MOTOR EXAMINATION  Dentition: Holley Sy

## 2021-04-20 NOTE — PLAN OF CARE
Assumed care 1900  Patient alert and oriented x4  G tube with small amount yellow drainage  Patient denies nausea  Up to bathroom with assist and walker  AV paced on tele

## 2021-04-20 NOTE — PLAN OF CARE
Assumed care @0730  VSS,   A&Ox4,   RA    A/V paced ,   Pain controlled with tylenol 3 and morphine, Most pain is at the g-tube site, but some chronic pain in her back and head, hip. Up with 1 assist and walker as tolerated.     Tolerating tube feeds at go

## 2021-04-20 NOTE — DIETARY NOTE
[facility]  NUTRITION ASSESSMENT    Pt does not meet malnutrition criteria. NUTRITION INTERVENTION:  1. Enteral Nutrition - Recommend starting Osmolite 1.2 at goal rate of 75 ml/hour.  Okay to start at goal rate.   o This will provide 2160 kcal, 100 per RN documentation     NUTRITION PRESCRIPTION: 56.8 kg IBW  Calories: 6369-2452 calories/day (30-35 calories per kg)  Protein: 68-85 grams protein/day (1.2-1.5 grams protein per kg)  Fluid: ~1 ml/kcal or per MD discretion    NUTRITION DIAGNOSIS/PROBLEM:

## 2021-04-20 NOTE — PROGRESS NOTES
04/20/21 1700   Clinical Encounter Type   Visited With Patient   Routine Visit Introduction   Continue Visiting No   Patient Spiritual Encounters   Spiritual Assessment Completed Yes   Spiritual Needs  provided emotional care   Spiritual Interve

## 2021-04-20 NOTE — PLAN OF CARE
Admission navigator complete. A&Ox4, RA, paced on the monitor. No nausea. Pain reported in abdomen at g-tube site, back, and head. Paged MD for pain medication. Will continue to monitor.

## 2021-04-28 NOTE — DISCHARGE SUMMARY
[facility] HOSPITALIST  DISCHARGE SUMMARY     Sinda Frame Patient Status:  Observation    10/9/1940 MRN [id number]   [facility] 7NE-A Attending Angi Sheth, DO   Hosp Day # 0 PCP Aimee Orozco     Date of Admission: 2021  Date of Tabs  Commonly known as: ELIQUIS      2.5 mg by Per G Tube route 2 (two) times daily. Refills: 0     Budesonide-Formoterol Fumarate 160-4.5 MCG/ACT Aero  Commonly known as: SYMBICORT      Inhale 2 puffs into the lungs 2 (two) times daily.    Refills: 0

## 2021-06-04 PROBLEM — R19.7 DIARRHEA: Status: ACTIVE | Noted: 2021-02-03

## 2021-06-04 PROBLEM — E86.0 DEHYDRATION: Status: ACTIVE | Noted: 2021-02-03

## 2021-06-04 PROBLEM — I48.0 PAROXYSMAL ATRIAL FIBRILLATION (HCC): Status: ACTIVE | Noted: 2021-06-04

## 2021-06-04 PROBLEM — R00.2 PALPITATIONS: Status: ACTIVE | Noted: 2021-06-04

## 2021-06-28 ENCOUNTER — LAB ENCOUNTER (OUTPATIENT)
Dept: LAB | Facility: HOSPITAL | Age: 81
End: 2021-06-28
Attending: INTERNAL MEDICINE
Payer: MEDICARE

## 2021-06-28 DIAGNOSIS — R19.7 DIARRHEA, UNSPECIFIED TYPE: ICD-10-CM

## 2021-06-28 DIAGNOSIS — R19.7 DIARRHEA IN ADULT PATIENT: ICD-10-CM

## 2021-06-29 PROCEDURE — 87046 STOOL CULTR AEROBIC BACT EA: CPT

## 2021-06-29 PROCEDURE — 87329 GIARDIA AG IA: CPT

## 2021-06-29 PROCEDURE — 87493 C DIFF AMPLIFIED PROBE: CPT

## 2021-06-29 PROCEDURE — 87177 OVA AND PARASITES SMEARS: CPT

## 2021-06-29 PROCEDURE — 87045 FECES CULTURE AEROBIC BACT: CPT

## 2021-06-29 PROCEDURE — 87209 SMEAR COMPLEX STAIN: CPT

## 2021-06-29 PROCEDURE — 83993 ASSAY FOR CALPROTECTIN FECAL: CPT

## 2021-06-29 PROCEDURE — 87427 SHIGA-LIKE TOXIN AG IA: CPT

## 2021-06-29 PROCEDURE — 82656 EL-1 FECAL QUAL/SEMIQ: CPT

## 2021-06-29 PROCEDURE — 87272 CRYPTOSPORIDIUM AG IF: CPT

## 2021-06-30 LAB — C DIFF TOX B STL QL: POSITIVE

## 2021-07-01 ENCOUNTER — TELEPHONE (OUTPATIENT)
Dept: NUTRITION | Facility: HOSPITAL | Age: 81
End: 2021-07-01

## 2021-07-01 LAB — PANCREATIC ELASTASE , FECAL: 172 UG/G

## 2021-07-01 NOTE — TELEPHONE ENCOUNTER
This RD spoke with pt's spouse Jim Veras, calling to cancel appt with RD today. Pt is c-diff positive. Offered phone or video visit, however, spouse preferred to call and re-schedule an in-person visit at a later date.  Will await appt to be made through [facility]

## 2021-07-03 LAB — CALPROTECTIN STL-MCNT: >800 ΜG/G (ref ?–50)

## 2021-07-31 ENCOUNTER — LAB ENCOUNTER (OUTPATIENT)
Dept: LAB | Facility: HOSPITAL | Age: 81
End: 2021-07-31
Attending: INTERNAL MEDICINE
Payer: MEDICARE

## 2021-07-31 DIAGNOSIS — K94.23 LEAKING PEG TUBE (HCC): ICD-10-CM

## 2021-08-01 LAB — SARS-COV-2 RNA RESP QL NAA+PROBE: NOT DETECTED

## 2021-08-03 ENCOUNTER — ANESTHESIA EVENT (OUTPATIENT)
Dept: ENDOSCOPY | Facility: HOSPITAL | Age: 81
End: 2021-08-03
Payer: MEDICARE

## 2021-08-03 ENCOUNTER — ANESTHESIA (OUTPATIENT)
Dept: ENDOSCOPY | Facility: HOSPITAL | Age: 81
End: 2021-08-03
Payer: MEDICARE

## 2021-08-03 ENCOUNTER — HOSPITAL ENCOUNTER (OUTPATIENT)
Facility: HOSPITAL | Age: 81
Setting detail: HOSPITAL OUTPATIENT SURGERY
Discharge: HOME | End: 2021-08-03
Attending: INTERNAL MEDICINE | Admitting: INTERNAL MEDICINE
Payer: MEDICARE

## 2021-08-03 VITALS
WEIGHT: 105 LBS | HEIGHT: 65.5 IN | SYSTOLIC BLOOD PRESSURE: 99 MMHG | DIASTOLIC BLOOD PRESSURE: 65 MMHG | OXYGEN SATURATION: 96 % | BODY MASS INDEX: 17.28 KG/M2 | RESPIRATION RATE: 18 BRPM | HEART RATE: 59 BPM | TEMPERATURE: 98 F

## 2021-08-03 DIAGNOSIS — K94.23 LEAKING PERCUTANEOUS ENDOSCOPIC GASTROSTOMY (PEG) TUBE (HCC): ICD-10-CM

## 2021-08-03 DIAGNOSIS — K94.23 LEAKING PEG TUBE (HCC): Primary | ICD-10-CM

## 2021-08-03 PROCEDURE — 0DJ08ZZ INSPECTION OF UPPER INTESTINAL TRACT, VIA NATURAL OR ARTIFICIAL OPENING ENDOSCOPIC: ICD-10-PCS | Performed by: INTERNAL MEDICINE

## 2021-08-03 PROCEDURE — 0DH63UZ INSERTION OF FEEDING DEVICE INTO STOMACH, PERCUTANEOUS APPROACH: ICD-10-PCS | Performed by: INTERNAL MEDICINE

## 2021-08-03 PROCEDURE — 0DP64UZ REMOVAL OF FEEDING DEVICE FROM STOMACH, PERCUTANEOUS ENDOSCOPIC APPROACH: ICD-10-PCS | Performed by: INTERNAL MEDICINE

## 2021-08-03 PROCEDURE — 0D718ZZ DILATION OF UPPER ESOPHAGUS, VIA NATURAL OR ARTIFICIAL OPENING ENDOSCOPIC: ICD-10-PCS | Performed by: INTERNAL MEDICINE

## 2021-08-03 RX ORDER — CIPROFLOXACIN 2 MG/ML
400 INJECTION, SOLUTION INTRAVENOUS ONCE
Status: COMPLETED | OUTPATIENT
Start: 2021-08-03 | End: 2021-08-03

## 2021-08-03 RX ORDER — LIDOCAINE HYDROCHLORIDE 10 MG/ML
INJECTION, SOLUTION EPIDURAL; INFILTRATION; INTRACAUDAL; PERINEURAL AS NEEDED
Status: DISCONTINUED | OUTPATIENT
Start: 2021-08-03 | End: 2021-08-03 | Stop reason: SURG

## 2021-08-03 RX ORDER — HYDROMORPHONE HYDROCHLORIDE 1 MG/ML
0.5 INJECTION, SOLUTION INTRAMUSCULAR; INTRAVENOUS; SUBCUTANEOUS EVERY 5 MIN PRN
Status: DISCONTINUED | OUTPATIENT
Start: 2021-08-03 | End: 2021-08-03

## 2021-08-03 RX ORDER — CIPROFLOXACIN 2 MG/ML
500 INJECTION, SOLUTION INTRAVENOUS EVERY 12 HOURS
Status: DISCONTINUED | OUTPATIENT
Start: 2021-08-03 | End: 2021-08-03

## 2021-08-03 RX ORDER — ONDANSETRON 2 MG/ML
4 INJECTION INTRAMUSCULAR; INTRAVENOUS AS NEEDED
Status: DISCONTINUED | OUTPATIENT
Start: 2021-08-03 | End: 2021-08-03

## 2021-08-03 RX ORDER — CIPROFLOXACIN 2 MG/ML
500 INJECTION, SOLUTION INTRAVENOUS ONCE
Status: DISCONTINUED | OUTPATIENT
Start: 2021-08-03 | End: 2021-08-03

## 2021-08-03 RX ORDER — SODIUM CHLORIDE, SODIUM LACTATE, POTASSIUM CHLORIDE, CALCIUM CHLORIDE 600; 310; 30; 20 MG/100ML; MG/100ML; MG/100ML; MG/100ML
INJECTION, SOLUTION INTRAVENOUS CONTINUOUS
Status: DISCONTINUED | OUTPATIENT
Start: 2021-08-03 | End: 2021-08-03

## 2021-08-03 RX ORDER — EZETIMIBE 10 MG/1
10 TABLET ORAL NIGHTLY
COMMUNITY
Start: 2021-06-22

## 2021-08-03 RX ORDER — CIPROFLOXACIN 2 MG/ML
400 INJECTION, SOLUTION INTRAVENOUS EVERY 12 HOURS
Status: DISCONTINUED | OUTPATIENT
Start: 2021-08-03 | End: 2021-08-03

## 2021-08-03 RX ORDER — NALOXONE HYDROCHLORIDE 0.4 MG/ML
80 INJECTION, SOLUTION INTRAMUSCULAR; INTRAVENOUS; SUBCUTANEOUS AS NEEDED
Status: DISCONTINUED | OUTPATIENT
Start: 2021-08-03 | End: 2021-08-03

## 2021-08-03 RX ORDER — HYDROMORPHONE HYDROCHLORIDE 1 MG/ML
INJECTION, SOLUTION INTRAMUSCULAR; INTRAVENOUS; SUBCUTANEOUS
Status: DISCONTINUED
Start: 2021-08-03 | End: 2021-08-03

## 2021-08-03 RX ADMIN — SODIUM CHLORIDE, SODIUM LACTATE, POTASSIUM CHLORIDE, CALCIUM CHLORIDE: 600; 310; 30; 20 INJECTION, SOLUTION INTRAVENOUS at 14:56:00

## 2021-08-03 RX ADMIN — LIDOCAINE HYDROCHLORIDE 50 MG: 10 INJECTION, SOLUTION EPIDURAL; INFILTRATION; INTRACAUDAL; PERINEURAL at 14:59:00

## 2021-08-03 NOTE — OPERATIVE REPORT
Raysa Leonard Patient Status:  Hospital Outpatient Surgery    10/9/1940 MRN [id number]   Location [address] [place] [address] Attending Silvano Owusu MD   Date 8/3/2021 PCP Galen hOara     PREOPERATIVE DIAGNOSIS/INDICATION: G tube through the trocar and grasped through the scope with the help of a snare, the wire was removed en block with the scope, after this a percutaneous gastrostomy tube, [employer] 20 Fr externally removable with a soft internal bolster was introduced wi

## 2021-08-03 NOTE — H&P
[facility] Patient Status:  Hospital Outpatient Surgery    10/9/1940 MRN [id number]   Location [address] [place] [address] Attending Jose Cruz Logan MD   Date 8/3/2021 PCP Oliva Cortez     CC: PEG Problem Relation Age of Onset   • Hypertension Mother    • Heart Attack Mother       reports that she quit smoking about 26 years ago. She smoked 1.00 pack per day. She has never used smokeless tobacco. She reports previous alcohol use.  She reports that Esophageal stricture     Cyst of ovary     Cerebrovascular disease     Choledocholithiasis     Weight loss, unintentional     Dehydration     Diarrhea     Palpitations     Paroxysmal atrial fibrillation (HCC)      PEG malfunction, leak, pain and dysphagia,

## 2021-08-03 NOTE — ANESTHESIA POSTPROCEDURE EVALUATION
[address] [facility] Patient Status:  Hospital Outpatient Surgery   Age/Gender 79year old female MRN [id number]   Location [id number] [place] [address] Attending Angelito Treadwell MD   Hosp Day # 0 [address]

## 2021-08-03 NOTE — ANESTHESIA PREPROCEDURE EVALUATION
PRE-OP EVALUATION    Patient Name: Dayana Yanez    Admit Diagnosis: Leaking percutaneous endoscopic gastrostomy (PEG) tube ([facility] [address] [K94.23]    Pre-op Diagnosis: Leaking percutaneous endoscopic gastrostomy (PEG) tube ([facility] [address] [K94.23]    Fanny Reich acetaminophen-codeine 120-12 MG/5ML Oral Solution, 5-10 mL by Per G Tube route 3 (three) times daily as needed. , Disp: , Rfl: , 8/2/2021 at Unknown time  nystatin [id number] UNIT/GM External Cream, Apply 1 Application topically 2 (two) times daily.  To affect UPPER GI ENDOSCOPY,EXAM       Social History    Tobacco Use      Smoking status: Former Smoker        Packs/day: 1.00        Quit date:         Years since quittin.6      Smokeless tobacco: Never Used      Tobacco comment: 30 years ago    Alcohol

## 2021-08-08 PROBLEM — A04.72 C. DIFFICILE COLITIS: Status: ACTIVE | Noted: 2021-08-08

## 2021-08-11 ENCOUNTER — HOSPITAL ENCOUNTER (OUTPATIENT)
Facility: HOSPITAL | Age: 81
Setting detail: OBSERVATION
Discharge: HOME HEALTH | End: 2021-08-15
Attending: EMERGENCY MEDICINE | Admitting: INTERNAL MEDICINE
Payer: MEDICARE

## 2021-08-11 ENCOUNTER — APPOINTMENT (OUTPATIENT)
Dept: CT IMAGING | Facility: HOSPITAL | Age: 81
End: 2021-08-11
Attending: INTERNAL MEDICINE
Payer: MEDICARE

## 2021-08-11 DIAGNOSIS — K94.20 GASTROSTOMY COMPLICATION (HCC): ICD-10-CM

## 2021-08-11 DIAGNOSIS — Z43.1 ATTENTION TO G-TUBE (HCC): Primary | ICD-10-CM

## 2021-08-11 PROCEDURE — 74176 CT ABD & PELVIS W/O CONTRAST: CPT | Performed by: INTERNAL MEDICINE

## 2021-08-11 RX ORDER — ALBUTEROL SULFATE 2.5 MG/3ML
2.5 SOLUTION RESPIRATORY (INHALATION) 2 TIMES DAILY
Status: DISCONTINUED | OUTPATIENT
Start: 2021-08-11 | End: 2021-08-12

## 2021-08-11 RX ORDER — MORPHINE SULFATE 2 MG/ML
2 INJECTION, SOLUTION INTRAMUSCULAR; INTRAVENOUS EVERY 2 HOUR PRN
Status: DISCONTINUED | OUTPATIENT
Start: 2021-08-11 | End: 2021-08-15

## 2021-08-11 RX ORDER — ALBUTEROL SULFATE 2.5 MG/3ML
2.5 SOLUTION RESPIRATORY (INHALATION) 2 TIMES DAILY
COMMUNITY

## 2021-08-11 RX ORDER — METOPROLOL TARTRATE 5 MG/5ML
5 INJECTION INTRAVENOUS EVERY 6 HOURS
Status: DISCONTINUED | OUTPATIENT
Start: 2021-08-11 | End: 2021-08-15

## 2021-08-11 RX ORDER — MORPHINE SULFATE 4 MG/ML
4 INJECTION, SOLUTION INTRAMUSCULAR; INTRAVENOUS ONCE
Status: COMPLETED | OUTPATIENT
Start: 2021-08-11 | End: 2021-08-11

## 2021-08-11 RX ORDER — SODIUM CHLORIDE FOR INHALATION 7 %
2 VIAL, NEBULIZER (ML) INHALATION 2 TIMES DAILY
COMMUNITY

## 2021-08-11 RX ORDER — DILTIAZEM HYDROCHLORIDE 5 MG/ML
10 INJECTION INTRAVENOUS EVERY 6 HOURS PRN
Status: DISCONTINUED | OUTPATIENT
Start: 2021-08-11 | End: 2021-08-15

## 2021-08-11 RX ORDER — MORPHINE SULFATE 4 MG/ML
4 INJECTION, SOLUTION INTRAMUSCULAR; INTRAVENOUS EVERY 2 HOUR PRN
Status: DISCONTINUED | OUTPATIENT
Start: 2021-08-11 | End: 2021-08-15

## 2021-08-11 RX ORDER — MORPHINE SULFATE 2 MG/ML
1 INJECTION, SOLUTION INTRAMUSCULAR; INTRAVENOUS EVERY 2 HOUR PRN
Status: DISCONTINUED | OUTPATIENT
Start: 2021-08-11 | End: 2021-08-15

## 2021-08-11 RX ORDER — ACETAMINOPHEN 325 MG/1
650 TABLET ORAL EVERY 6 HOURS PRN
Status: DISCONTINUED | OUTPATIENT
Start: 2021-08-11 | End: 2021-08-15

## 2021-08-11 RX ORDER — DEXTROSE AND SODIUM CHLORIDE 5; .9 G/100ML; G/100ML
INJECTION, SOLUTION INTRAVENOUS CONTINUOUS
Status: DISCONTINUED | OUTPATIENT
Start: 2021-08-11 | End: 2021-08-15

## 2021-08-11 NOTE — H&P (VIEW-ONLY)
[facility]                       [facility]    Carlishelley South Patient Status:  Emergency    10/9/1940 MRN [id number]   Location [address] [facility] Attending Jose Preston MD   [facility] Day • Sputum production    • Stroke [facility])     11/2020/   pt is receving PT and OT therapy- using walker but wheelchair if long distances   • Wears glasses    • Weight loss      PSHx:   Past Surgical History:   Procedure Laterality Date   • CARDIAC PACEMAKER depression, anxiety, suicidal ideation, or homicidal ideation           Oncologic: + breast cancer           ENT: The patient reports no hoarseness of voice, hearing loss, sinus congestion, tinnitus           Neurologic: + CVA  PE: /64   Pulse 59   T indentation were noted the new site at the epigastrium was prepped with Betadine, was the covered with a fenestrated sterile drape, the skin was numbed using a 22 gauge needle and a 5 cc syringe with 1% lidocaine, a total of 2 cc were injected, after this endoscopic closure. Recommendations:     1. Will discuss with interventional GI to determine if current situation is amenable to endoscopic intervention  VS general surgery consultation   2. NPO and hold TF at this time   3.  Please hold Eliquis if the

## 2021-08-11 NOTE — ED PROVIDER NOTES
Patient Seen in: [facility] Emergency Department      History   Patient presents with:   Other    Stated Complaint: old g tube site leaking    HPI/Subjective:   HPI    57-year-old female presents emergency department who had a G-tube site recently re • LUMPECTOMY RIGHT     • OTHER      surgery to remove cyst in throat 1980's   • UPPER GI ENDOSCOPY,EXAM                  Social History    Tobacco Use      Smoking status: Former Smoker        Packs/day: 1.00        Quit date: 1995        Years [facility] crackles no wheezes no accessory muscle use  Abdomen: Patient has a old G-tube site that appears completely patent that is leaking a significant amount of fluid no rebound no guarding  no hepatosplenomegaly bowel sounds are present , no pulsatile mass  Ext Attention to G-tube [facility]) Z43.1 8/11/2021 Unknown

## 2021-08-11 NOTE — CONSULTS
[facility]                       Gastroenterology Consultation-[facility]    Jaye Delcid Patient Status:  Emergency    10/9/1940 MRN [id number]   Location [address] [facility] Attending Judy Alcaraz MD   [facility] Day • Sputum production    • Stroke [facility])     11/2020/   pt is receving PT and OT therapy- using walker but wheelchair if long distances   • Wears glasses    • Weight loss      PSHx:   Past Surgical History:   Procedure Laterality Date   • CARDIAC PACEMAKER depression, anxiety, suicidal ideation, or homicidal ideation           Oncologic: + breast cancer           ENT: The patient reports no hoarseness of voice, hearing loss, sinus congestion, tinnitus           Neurologic: + CVA  PE: /64   Pulse 59   T indentation were noted the new site at the epigastrium was prepped with Betadine, was the covered with a fenestrated sterile drape, the skin was numbed using a 22 gauge needle and a 5 cc syringe with 1% lidocaine, a total of 2 cc were injected, after this endoscopic closure. Recommendations:     1. Will discuss with interventional GI to determine if current situation is amenable to endoscopic intervention  VS general surgery consultation   2. NPO and hold TF at this time   3.  Please hold Eliquis if the

## 2021-08-12 ENCOUNTER — ANESTHESIA EVENT (OUTPATIENT)
Dept: ENDOSCOPY | Facility: HOSPITAL | Age: 81
End: 2021-08-12
Payer: MEDICARE

## 2021-08-12 LAB
ANION GAP SERPL CALC-SCNC: 4 MMOL/L (ref 0–18)
ANION GAP SERPL CALC-SCNC: 5 MMOL/L (ref 0–18)
APTT PPP: 26.7 SECONDS (ref 25.4–36.1)
BASOPHILS # BLD AUTO: 0.05 X10(3) UL (ref 0–0.2)
BASOPHILS NFR BLD AUTO: 0.9 %
BUN BLD-MCNC: 18 MG/DL (ref 7–18)
BUN BLD-MCNC: 23 MG/DL (ref 7–18)
CALCIUM BLD-MCNC: 8.4 MG/DL (ref 8.5–10.1)
CALCIUM BLD-MCNC: 8.4 MG/DL (ref 8.5–10.1)
CHLORIDE SERPL-SCNC: 111 MMOL/L (ref 98–112)
CHLORIDE SERPL-SCNC: 114 MMOL/L (ref 98–112)
CO2 SERPL-SCNC: 26 MMOL/L (ref 21–32)
CO2 SERPL-SCNC: 28 MMOL/L (ref 21–32)
CREAT BLD-MCNC: 0.75 MG/DL
CREAT BLD-MCNC: 0.94 MG/DL
EOSINOPHIL # BLD AUTO: 0.42 X10(3) UL (ref 0–0.7)
EOSINOPHIL NFR BLD AUTO: 7.2 %
ERYTHROCYTE [DISTWIDTH] IN BLOOD BY AUTOMATED COUNT: 13 %
ERYTHROCYTE [DISTWIDTH] IN BLOOD BY AUTOMATED COUNT: 13 %
GLUCOSE BLD-MCNC: 108 MG/DL (ref 70–99)
GLUCOSE BLD-MCNC: 93 MG/DL (ref 70–99)
HCT VFR BLD AUTO: 40.7 %
HCT VFR BLD AUTO: 42.1 %
HGB BLD-MCNC: 12.7 G/DL
HGB BLD-MCNC: 13.1 G/DL
IMM GRANULOCYTES # BLD AUTO: 0.03 X10(3) UL (ref 0–1)
IMM GRANULOCYTES NFR BLD: 0.5 %
INR BLD: 1.18 (ref 0.89–1.11)
LYMPHOCYTES # BLD AUTO: 1.15 X10(3) UL (ref 1–4)
LYMPHOCYTES NFR BLD AUTO: 19.6 %
MCH RBC QN AUTO: 32.5 PG (ref 26–34)
MCH RBC QN AUTO: 32.9 PG (ref 26–34)
MCHC RBC AUTO-ENTMCNC: 30.2 G/DL (ref 31–37)
MCHC RBC AUTO-ENTMCNC: 32.2 G/DL (ref 31–37)
MCV RBC AUTO: 102.3 FL
MCV RBC AUTO: 107.7 FL
MONOCYTES # BLD AUTO: 0.6 X10(3) UL (ref 0.1–1)
MONOCYTES NFR BLD AUTO: 10.2 %
NEUTROPHILS # BLD AUTO: 3.62 X10 (3) UL (ref 1.5–7.7)
NEUTROPHILS # BLD AUTO: 3.62 X10(3) UL (ref 1.5–7.7)
NEUTROPHILS NFR BLD AUTO: 61.6 %
OSMOLALITY SERPL CALC.SUM OF ELEC: 300 MOSM/KG (ref 275–295)
OSMOLALITY SERPL CALC.SUM OF ELEC: 302 MOSM/KG (ref 275–295)
PLATELET # BLD AUTO: 169 10(3)UL (ref 150–450)
PLATELET # BLD AUTO: 186 10(3)UL (ref 150–450)
POTASSIUM SERPL-SCNC: 3.6 MMOL/L (ref 3.5–5.1)
POTASSIUM SERPL-SCNC: 3.8 MMOL/L (ref 3.5–5.1)
PSA SERPL DL<=0.01 NG/ML-MCNC: 15.3 SECONDS (ref 12.2–14.5)
RBC # BLD AUTO: 3.91 X10(6)UL
RBC # BLD AUTO: 3.98 X10(6)UL
SODIUM SERPL-SCNC: 143 MMOL/L (ref 136–145)
SODIUM SERPL-SCNC: 145 MMOL/L (ref 136–145)
WBC # BLD AUTO: 5.2 X10(3) UL (ref 4–11)
WBC # BLD AUTO: 5.9 X10(3) UL (ref 4–11)

## 2021-08-12 PROCEDURE — 99203 OFFICE O/P NEW LOW 30 MIN: CPT | Performed by: SURGERY

## 2021-08-12 RX ORDER — HEPARIN SODIUM 5000 [USP'U]/ML
60 INJECTION INTRAVENOUS; SUBCUTANEOUS ONCE
Status: COMPLETED | OUTPATIENT
Start: 2021-08-12 | End: 2021-08-12

## 2021-08-12 RX ORDER — HEPARIN SODIUM AND DEXTROSE 10000; 5 [USP'U]/100ML; G/100ML
INJECTION INTRAVENOUS CONTINUOUS
Status: DISCONTINUED | OUTPATIENT
Start: 2021-08-12 | End: 2021-08-13

## 2021-08-12 RX ORDER — ALBUTEROL SULFATE 2.5 MG/3ML
2.5 SOLUTION RESPIRATORY (INHALATION) EVERY 6 HOURS PRN
Status: DISCONTINUED | OUTPATIENT
Start: 2021-08-12 | End: 2021-08-15

## 2021-08-12 RX ORDER — HEPARIN SODIUM AND DEXTROSE 10000; 5 [USP'U]/100ML; G/100ML
12 INJECTION INTRAVENOUS ONCE
Status: COMPLETED | OUTPATIENT
Start: 2021-08-12 | End: 2021-08-13

## 2021-08-12 NOTE — PROGRESS NOTES
[facility] Gastroenterology Progress Note    CC: G tube site leakage     S: Less leakage today as pt is npo     O: /41 (BP Location: Right arm)   Pulse 61   Temp 98.3 °F (36.8 °C) (Oral)   Resp 18   Ht 5' 4\" (1.626 m)   Wt 96 lb 1.6 oz (43.6 kg

## 2021-08-12 NOTE — DIETARY NOTE
[facility]  NUTRITION ASSESSMENT    Pt does not meet malnutrition criteria. NUTRITION INTERVENTION:  1. Enteral Nutrition - Recommend starting Osmolite 1.2 at goal rate of 75 ml/hour.  Okay to start at goal rate.   o This will provide 2160 kcal, 100 Chronic G-tube    NUTRITION RELATED PHYSICAL FINDINGS:  1. Body Fat/Muscle Mass: mild depletion muscle mass   2.  Fluid Accumulation: none per RN documentation     NUTRITION PRESCRIPTION: 56.8 kg IBW  Calories: 3061-1827 calories/day (30-35 calories per kg)

## 2021-08-12 NOTE — SLP NOTE
ADULT SWALLOWING EVALUATION    ASSESSMENT    ASSESSMENT/OVERALL IMPRESSION:  Patient is an 78 y/o female know to this service from previous admissions. Patient is PEG tube dependent d/t prolonged history of esophageal dysphagia.  She consumes small amounts swallowing     since 1995- due to scar tissue resulted from surgery  for Cyst removal from throat per pt   • Renal disorder    • Scar tissue     of the Esophagus-    Has Gastrostomy tube   • Sputum production    • Stroke [facility])     11/2020/   pt is receving

## 2021-08-12 NOTE — CONSULTS
[facility]  Report of Consultation    Moiz Rodríguez Patient Status:  Observation    10/9/1940 MRN [id number]   [facility] 3NE-A Attending Jessie Alexander, DO   Hosp Day # 0 PCP Nima Jenkins     Reason for Consultation:  Komal Pandya placed approximately 15 years ago. She had a CVA with TPA in 10/2020 and has COPD.         History:  Past Medical History:   Diagnosis Date   • Abdominal pain    • Acute, but ill-defined, cerebrovascular disease    • Arrhythmia     a-fib   • Bad breath    • COMMENTS)    Comment:Myalgias  Sulfa Antibiotics       RASH  Warfarin                OTHER (SEE COMMENTS)    Comment:Known PAF             She has recurrent bleeding issues w this product    Medications:  Current Discharge Medication List    CONTINUE these heartbeat/palpitations  Constitutional:  Negative for decreased activity, fever, irritability and lethargy  Endocrine:  Negative for abnormal sleep patterns, increased activity, polydipsia and polyphagia  Gastrointestinal:  Positive for for abdominal pain. lesion, fluid collection, ductal dilatation, or atrophy.     SPLEEN:  No enlargement or focal lesion.     KIDNEYS:  No mass, obstruction, or calcification.     ADRENALS:  Thickening of the adrenal glands is noted.    AORTA/VASCULAR:    Vascular calcificatio complication (HCC)     Bile duct abnormality     Upper abdominal pain     Acute pancreatitis without infection or necrosis, unspecified pancreatitis type     Hallucinations     Emesis, persistent     Esophageal stricture     Cyst of ovary     Cerebrovascul has been doing three 4 ounce feedings per day. On exam:    In general the patient is awake and comfortable in no acute distress. Head and neck: Without scleral icterus; no JVD. Chest: Symmetrically clear. Heart: Normal S1 and S2.   Abdomen: Soft, nonten

## 2021-08-12 NOTE — ANESTHESIA PREPROCEDURE EVALUATION
PRE-OP EVALUATION    Patient Name: Marlen Woody    Admit Diagnosis: Leaking percutaneous endoscopic gastrostomy (PEG) tube ([facility] [address] [K94.23]    Pre-op Diagnosis: Leaking percutaneous endoscopic gastrostomy (PEG) tube ([facility] [address] [K94.23]    Jim Shaw hypertension   (+) hyperlipidemia        (+) pacemaker/AICD       (+) dysrhythmias and atrial fibrillation                  Endo/Other  Comment: R breast ca                         (+) arthritis       Pulmonary  Comment: Former smoker      (+) COPD and sev and patient meets guidelines. Patient has taken beta blockers in last 24 hours. Comment: MAC with GA backup discussed. MAC risks, benefits and alternatives discussed.   Discussion included potential for recall of intraop events based on the nature of 25 mL, Oral, ONCE PRN        Outpatient Medications:   albuterol sulfate (2.5 MG/3ML) 0.083% Inhalation Nebu Soln, Take 2.5 mg by nebulization 2 (two) times a day., Disp: , Rfl: , Past Week at Unknown time  sodium chloride 7 % Inhalation Nebu Soln, Take 2 Oral Tab, 10 mg by Per G Tube route nightly.  , Disp: , Rfl: , 8/10/2021 at 2100        Allergies: Penicillin G, Statins, Sulfa Antibiotics, and Warfarin      Anesthesia Evaluation    Patient summary reviewed.     Anesthetic Complications  (-) history of an Adjustment reaction with anxiety     Bronchiectasis without complication (HCC)     Cardiac pacemaker in situ     Cigarette nicotine dependence in remission     COPD, severe (HCC)     Essential hypertension, benign     Mixed hyperlipidemia     Post-tussive Component Value Date    INR 1.18 (H) 08/12/2021         Airway      Mallampati: II  Mouth opening: 3 FB  TM distance: 4 - 6 cm  Neck ROM: full Cardiovascular      Rhythm: irregular  Rate: normal     Dental  Comment: Multiple missing teeth             Pulmo

## 2021-08-12 NOTE — CONSULTS
[facility]  Consultation    Jody Mcmahon Patient Status:  Observation    10/9/1940 MRN [id number]   [facility] 3NE-A Attending Loren Reddy DO   Hosp Day # 0 PCP [address]     Reason for Consultation:  A to organism    • Problems with swallowing     since 1995- due to scar tissue resulted from surgery  for Cyst removal from throat per pt   • Renal disorder    • Scar tissue     of the Esophagus-    Has Gastrostomy tube   • Sputum production    • Stroke [facility] sulfate (PF) 2 MG/ML injection 1 mg, 1 mg, Intravenous, Q2H PRN **OR** morphINE sulfate (PF) 2 MG/ML injection 2 mg, 2 mg, Intravenous, Q2H PRN **OR** morphINE sulfate (PF) 4 MG/ML injection 4 mg, 4 mg, Intravenous, Q2H PRN  •  Diatrizoate Meglumine & Sodi segments are normal.            RIGHT VENTRICLE:    The right ventricle is normal in size. Right ventricular systolic function is normal.    The estimated right ventricular systolic pressure is 22 mmHg. The right atrial pressure is 3 mmHg.  Finding is consi to beat variation due to A-Fib Left ventricular diastolic function was not evaluated due to atrial fibrillation.     2) The right ventricle is normal in size.  Right ventricular systolic function is normal. The estimated right ventricular systolic pressure i Boo Tomlinson MD on 8/11/2021 at 11:03 PM     Finalized by (CST): Boo Tomlinson MD on 8/11/2021 at 11:08 PM            Assessment:  · G tube site leakage - patient NPO for plans for EGD with G tube closure and plans to convert G tube to J tube  ·

## 2021-08-12 NOTE — PLAN OF CARE
NURSING ADMISSION NOTE      Patient admitted via Cart  Oriented to room. Safety precautions initiated. Bed in low position. Call light in reach. Assumed care of patient @ 2000. A/O x 4. VSS.  Burning, \"stinging\" pain noted in area between new PE behaviors that affect risk of falls.   - [place] fall precautions as indicated by assessment.  - Educate pt/family on patient safety including physical limitations  - Instruct pt to call for assistance with activity based on assessment  - Modify environme balance  Outcome: Progressing  Goal: Maintains or returns to baseline bowel function  Description: INTERVENTIONS:  - Assess bowel function  - Maintain adequate hydration with IV or PO as ordered and tolerated  - Evaluate effectiveness of GI medications  - surrounding tissue  - Implement wound care per orders  - Initiate isolation precautions as appropriate  - Initiate Pressure Ulcer prevention bundle as indicated  Outcome: Progressing  Goal: Oral mucous membranes remain intact  Description: INTERVENTIONS  -

## 2021-08-12 NOTE — PROGRESS NOTES
08/12/21 1559   Clinical Encounter Type   Visited With Patient and family together   Routine Visit Introduction   Continue Visiting No   Crisis Visit   (Advance Directive)    found that a  HCPOA was already created.  The family had not known unti

## 2021-08-12 NOTE — H&P
[facility] Hospitalist History and Physical      Patient presents with:   Other       PCP: Geovanny Duncan      History of Present Illness: Patient is a 79year old female with PMH sig for esophageal strictures s/p PEG, a-fib s/p PPM, CVA, and COPD presented to  dual chamber   • EGD     • IR ANGIOGRAM CEREBRAL CAROTID UNILATERAL  10/26/2020        • IR INTRA ARTERIAL STROKE INTERVENTION  10/26/2020        • LUMPECTOMY RIGHT     • OTHER      surgery to remove cyst in throat 1980's   • UPPER GI ENDOSCOPY,EXAM affected area , Disp: , Rfl:   gemfibrozil 600 MG Oral Tab, 300 mg by Per G Tube route 2 (two) times a day.  , Disp: , Rfl:   Budesonide-Formoterol Fumarate 160-4.5 MCG/ACT Inhalation Aerosol, Inhale 2 puffs into the lungs 2 (two) times daily. , Disp: , Rfl Dose reduction techniques were used. Dose information is transmitted to the ACR [facility] [employer] Rad[facility]) NRDR ([address]) which includes the Dose Index Registry.   PATIENT STATED HISTORY: (As transcribed by Technologist)  Trae Villarreal COPD presented to the ED yesterday afternoon for further evaluation of malfunctioning G-tube.     # Malfunction G-tube with leakage   # Esophageal strictures s/p PEG  - appreciate GI evaluation   - await gen surg eval   - keep NPO, hold eliquis     # Chroni

## 2021-08-12 NOTE — PLAN OF CARE
Pt is A&O x 4 with  at the bedside. Pt is NPO and continues to have pain at the old gtube site for which she takes prn morphine iv. Pt is on room air. Eliquis is being held. Pt signed informed consent for egd.   Consult to [place] heart cardiolo Identify cognitive and physical deficits and behaviors that affect risk of falls.   - [place] fall precautions as indicated by assessment.  - Educate pt/family on patient safety including physical limitations  - Instruct pt to call for assistance with act nutritional consult as needed  - Evaluate fluid balance  Outcome: Progressing  Goal: Maintains or returns to baseline bowel function  Description: INTERVENTIONS:  - Assess bowel function  - Maintain adequate hydration with IV or PO as ordered and tolerated dressing/incision, wound bed, drain sites and surrounding tissue  - Implement wound care per orders  - Initiate isolation precautions as appropriate  - Initiate Pressure Ulcer prevention bundle as indicated  Outcome: Progressing  Goal: Oral mucous membrane

## 2021-08-13 ENCOUNTER — ANESTHESIA (OUTPATIENT)
Dept: ENDOSCOPY | Facility: HOSPITAL | Age: 81
End: 2021-08-13
Payer: MEDICARE

## 2021-08-13 LAB
ANION GAP SERPL CALC-SCNC: 2 MMOL/L (ref 0–18)
APTT PPP: 26.5 SECONDS (ref 25.4–36.1)
APTT PPP: 27 SECONDS (ref 25.4–36.1)
APTT PPP: 67.2 SECONDS (ref 25.4–36.1)
ATRIAL RATE: 59 BPM
ATRIAL RATE: 60 BPM
BASOPHILS # BLD AUTO: 0.03 X10(3) UL (ref 0–0.2)
BASOPHILS NFR BLD AUTO: 0.5 %
BUN BLD-MCNC: 13 MG/DL (ref 7–18)
C DIFF TOX B STL QL: NEGATIVE
CALCIUM BLD-MCNC: 8.2 MG/DL (ref 8.5–10.1)
CHLORIDE SERPL-SCNC: 117 MMOL/L (ref 98–112)
CO2 SERPL-SCNC: 26 MMOL/L (ref 21–32)
CREAT BLD-MCNC: 0.78 MG/DL
EOSINOPHIL # BLD AUTO: 0.21 X10(3) UL (ref 0–0.7)
EOSINOPHIL NFR BLD AUTO: 3.7 %
ERYTHROCYTE [DISTWIDTH] IN BLOOD BY AUTOMATED COUNT: 13 %
GLUCOSE BLD-MCNC: 104 MG/DL (ref 70–99)
GLUCOSE BLD-MCNC: 77 MG/DL (ref 70–99)
GLUCOSE BLD-MCNC: 85 MG/DL (ref 70–99)
HAV IGM SER QL: 2 MG/DL (ref 1.6–2.6)
HCT VFR BLD AUTO: 39.1 %
HGB BLD-MCNC: 12.2 G/DL
IMM GRANULOCYTES # BLD AUTO: 0.02 X10(3) UL (ref 0–1)
IMM GRANULOCYTES NFR BLD: 0.3 %
LYMPHOCYTES # BLD AUTO: 1.27 X10(3) UL (ref 1–4)
LYMPHOCYTES NFR BLD AUTO: 22.1 %
MCH RBC QN AUTO: 32.1 PG (ref 26–34)
MCHC RBC AUTO-ENTMCNC: 31.2 G/DL (ref 31–37)
MCV RBC AUTO: 102.9 FL
MONOCYTES # BLD AUTO: 0.61 X10(3) UL (ref 0.1–1)
MONOCYTES NFR BLD AUTO: 10.6 %
NEUTROPHILS # BLD AUTO: 3.61 X10 (3) UL (ref 1.5–7.7)
NEUTROPHILS # BLD AUTO: 3.61 X10(3) UL (ref 1.5–7.7)
NEUTROPHILS NFR BLD AUTO: 62.8 %
OSMOLALITY SERPL CALC.SUM OF ELEC: 299 MOSM/KG (ref 275–295)
P AXIS: 8 DEGREES
P-R INTERVAL: 248 MS
P-R INTERVAL: 256 MS
PLATELET # BLD AUTO: 172 10(3)UL (ref 150–450)
POTASSIUM SERPL-SCNC: 3.4 MMOL/L (ref 3.5–5.1)
Q-T INTERVAL: 446 MS
Q-T INTERVAL: 462 MS
QRS DURATION: 82 MS
QRS DURATION: 86 MS
QTC CALCULATION (BEZET): 446 MS
QTC CALCULATION (BEZET): 462 MS
R AXIS: -5 DEGREES
R AXIS: 5 DEGREES
RBC # BLD AUTO: 3.8 X10(6)UL
SODIUM SERPL-SCNC: 145 MMOL/L (ref 136–145)
T AXIS: -2 DEGREES
T AXIS: -6 DEGREES
VENTRICULAR RATE: 60 BPM
VENTRICULAR RATE: 60 BPM
WBC # BLD AUTO: 5.8 X10(3) UL (ref 4–11)

## 2021-08-13 PROCEDURE — 99225 SUBSEQUENT OBSERVATION CARE: CPT | Performed by: SURGERY

## 2021-08-13 PROCEDURE — 0DJ08ZZ INSPECTION OF UPPER INTESTINAL TRACT, VIA NATURAL OR ARTIFICIAL OPENING ENDOSCOPIC: ICD-10-PCS | Performed by: INTERNAL MEDICINE

## 2021-08-13 DEVICE — 3-PORT THROUGH-THE-PEG J-TUBE KIT
Type: IMPLANTABLE DEVICE | Site: ABDOMEN | Status: FUNCTIONAL
Brand: ENDOVIVE JEJUNAL FEEDING TUBE

## 2021-08-13 RX ORDER — HEPARIN SODIUM 5000 [USP'U]/ML
30 INJECTION INTRAVENOUS; SUBCUTANEOUS ONCE
Status: COMPLETED | OUTPATIENT
Start: 2021-08-13 | End: 2021-08-13

## 2021-08-13 RX ORDER — ACETAMINOPHEN AND CODEINE PHOSPHATE 120; 12 MG/5ML; MG/5ML
SOLUTION ORAL EVERY 8 HOURS PRN
Status: DISCONTINUED | OUTPATIENT
Start: 2021-08-13 | End: 2021-08-15

## 2021-08-13 RX ORDER — AMIODARONE HYDROCHLORIDE 200 MG/1
200 TABLET ORAL DAILY
Status: DISCONTINUED | OUTPATIENT
Start: 2021-08-13 | End: 2021-08-15

## 2021-08-13 RX ORDER — NALOXONE HYDROCHLORIDE 0.4 MG/ML
80 INJECTION, SOLUTION INTRAMUSCULAR; INTRAVENOUS; SUBCUTANEOUS AS NEEDED
Status: DISCONTINUED | OUTPATIENT
Start: 2021-08-13 | End: 2021-08-13 | Stop reason: HOSPADM

## 2021-08-13 RX ORDER — SODIUM CHLORIDE, SODIUM LACTATE, POTASSIUM CHLORIDE, CALCIUM CHLORIDE 600; 310; 30; 20 MG/100ML; MG/100ML; MG/100ML; MG/100ML
INJECTION, SOLUTION INTRAVENOUS CONTINUOUS
Status: DISCONTINUED | OUTPATIENT
Start: 2021-08-13 | End: 2021-08-15

## 2021-08-13 RX ORDER — ONDANSETRON HYDROCHLORIDE 4 MG/5ML
4 SOLUTION ORAL EVERY 4 HOURS PRN
Status: DISCONTINUED | OUTPATIENT
Start: 2021-08-13 | End: 2021-08-15

## 2021-08-13 RX ORDER — AMIODARONE HYDROCHLORIDE 200 MG/1
200 TABLET ORAL 2 TIMES DAILY WITH MEALS
Status: DISCONTINUED | OUTPATIENT
Start: 2021-08-13 | End: 2021-08-13

## 2021-08-13 NOTE — PROGRESS NOTES
[facility] Hospitalist Progress Note                                                                   [address] [facility]  10/9/1940    SUBJECTIVE:  Pt seen and examined.   Back from EGD with conversi sulfate, dilTIAZem HCl, acetaminophen, morphINE sulfate **OR** morphINE sulfate **OR** morphINE sulfate, Diatrizoate Meglumine & Sodium    Assessment/Plan:  Principal Problem:    Attention to G-tube [facility])    79 yr old female with PMH sig for esophageal str

## 2021-08-13 NOTE — CONSULTS
[facility]  Report of Inpatient Wound Care Consultation    Kurtis Romero [facility] Patient Status:  Observation    10/9/1940 MRN [id number]   Location [address] [place] [address] Attending Stephanie Mcmillan DO   Hosp Day # 0 PCP Noni Escobra 1980's   • UPPER GI ENDOSCOPY,EXAM        reports that she quit smoking about 26 years ago. She smoked 1.00 pack per day. She has never used smokeless tobacco. She reports previous alcohol use. She reports that she does not use drugs.     Allergies:  @ALLER

## 2021-08-13 NOTE — PROGRESS NOTES
[facility]     Cardiology Progress Note    Moiz Rodríguez Patient Status:  Observation    10/9/1940 MRN [id number]   [facility] 3NE-A Attending Jessie Alexander, DO   Hosp Day # 0 PCP Nima Jenkins       SUBJECTIVE:  Denies  Any vomiting or change in bowel habits. : Denies any difficulty with urination. Neuro:  Denies any headaches, focal weakness or paresthesia. All other systems reviewed and are negative.           Labs:     Recent Labs   Lab 08/12/21  6431 08/12/21  8390 (VENTOLIN) (2.5 MG/3ML) 0.083% nebulizer solution 2.5 mg, 2.5 mg, Nebulization, Q6H PRN  heparin (PORCINE) drip [id number]/250mL infusion CONTINUOUS, 200-3,000 Units/hr, Intravenous, Continuous  Fluticasone Furoate-Vilanterol (BREO ELLIPTA) 200-25 MCG/INH

## 2021-08-13 NOTE — PROGRESS NOTES
[facility]  Progress Note    Marlen Annalise Patient Status:  Observation    10/9/1940 MRN [id number]   [facility] 3NE-A Attending Kendra Gotti,    Hosp Day # 0 PCP Camille Eli     Subjective:   The patient is resting comfo patient and reviewed all relevant labs and reports. I agree with her physical exam and the data listed in the report, and I have made any relevant changes in editing her note.     I agree with the above listed assessment and plan and again have modified the

## 2021-08-13 NOTE — INTERVAL H&P NOTE
Pre-op Diagnosis: Gastrostomy complication ([facility] [address] [X98.21]    The above referenced H&P was reviewed by Fatimah Belle DO on 8/13/2021, the patient was examined and no significant changes have occurred in the patient's condition since the H&P was performed

## 2021-08-13 NOTE — CM/SW NOTE
08/13/21 1100   CM/SW Referral Data   Referral Source Physician   Reason for Referral Discharge planning   Informant Spouse/Significant Other;EMR   Patient Info   Patient's Home Environment Condo/Apt with elevator   Number of Levels in Home 1   Number o

## 2021-08-13 NOTE — OPERATIVE REPORT
Operative Report-Esophagogastroduodenoscopy      PREOPERATIVE DIAGNOSIS/INDICATION: gastrostomy tube site leakage     POSTOPERTATIVE DIAGNOSIS: Gastrostomy tube conversion to GJ tube     PROCEDURE PERFORMED: EGD    INFORMED CONSENT distal duodenum/proximal jejunum by use of rat-tooth forceps and then clipped into place with a [place] resolution clip. - DUODENUM: Normal to examined extent. THERAPEUTICS: J-tube extension placement status post clip.     RECOMMENDATIONS:     - Post

## 2021-08-13 NOTE — DIETARY NOTE
Clinical Nutrition    S/p GJ tube placement with GI this AM.  Pt will need continuous feeds via pump for home. Recommend for home:  Osmolite 1.5 @55 ml/hr x 24h.  Cycle as able to goal of Osmolite 1.5 @ 110ml/hr x 12h    If pt has Osmolite 1.2 at home

## 2021-08-13 NOTE — PLAN OF CARE
Patient alert and oriented x4. On Ra, . Apaced on tele. HR 60s. Patient complains of headache. PRN tylenol. Jtube in place remains cdi. Old incision site seen by wound care. Dressing remains cdi. Tube feeds running.   osmolite 1.2 running at 75m

## 2021-08-13 NOTE — PROGRESS NOTES
Pt had complained of some new onset chest pain about 0010, radiating to her back, possibly aggravated by the tele lead. Vital signs stable- pt stating her pain 8/10, EKG completed- A paced with prolonged AV Conduction.  Scheduled metoprolol and morphine IV

## 2021-08-13 NOTE — CM/SW NOTE
Dietician alerted sw that pt will now need pump for her TF due to [address] tube. Dietician recs and orders sent to [facility] and to [facility]. Pt will need same day RN visit on day of dc and [facility] need to be called with dc date so they can deliver pump.

## 2021-08-13 NOTE — ANESTHESIA POSTPROCEDURE EVALUATION
[address] [facility] Patient Status:  Observation   Age/Gender 79year old female MRN [id number]   Location [address] [place] [address] Attending Kaveh Dominguez 1397 Day # 0 PCP Oswaldo Argueta       Anesthesia Post-op Not

## 2021-08-14 LAB
GLUCOSE BLD-MCNC: 108 MG/DL (ref 70–99)
GLUCOSE BLD-MCNC: 110 MG/DL (ref 70–99)
GLUCOSE BLD-MCNC: 111 MG/DL (ref 70–99)
GLUCOSE BLD-MCNC: 122 MG/DL (ref 70–99)
PLATELET # BLD AUTO: 153 10(3)UL (ref 150–450)

## 2021-08-14 NOTE — PROGRESS NOTES
Assumed care at 1, tube feeding was off  Per patient request and MD was notified by previous RN. Alert,  anxious to go home.  C/o pain to new G/[employer] site, prn pain med given, requested  To get her feeding running for only one hour, explained to her the

## 2021-08-14 NOTE — PROGRESS NOTES
[facility] Hospitalist Progress Note                                                                   [address] [facility]  10/9/1940    SUBJECTIVE:  No issues.       OBJECTIVE:  Temp:  [97.6 °F (36.4 **OR** morphINE sulfate, Diatrizoate Meglumine & Sodium    Assessment/Plan:  Principal Problem:    Attention to G-tube [facility])    79 yr old female with PMH sig for esophageal strictures s/p PEG, a-fib s/p PPM, CVA, and COPD presented to the ED yesterday afte

## 2021-08-14 NOTE — PLAN OF CARE
Assumed care of patient this AM at 0730. Alert and oriented. No acute distress noted. VSS. C/o of generalized pain this AM, 9/10. PRN pain med given per [facility]. Tube feedings running at 40 ml/hr, patient tolerating well. D5 with 0.9 running at 100 ml/hr.   Saf SAFETY ADULT - FALL  Goal: Free from fall injury  Description: INTERVENTIONS:  - Assess pt frequently for physical needs  - Identify cognitive and physical deficits and behaviors that affect risk of falls.   - Lavinia fall precautions as indicated by asse medications  - Provide nonpharmacologic comfort measures as appropriate  - Advance diet as tolerated, if ordered  - Obtain nutritional consult as needed  - Evaluate fluid balance  Outcome: Progressing  Goal: Maintains or returns to baseline bowel function and document risk factors for pressure ulcer development  - Assess and document skin integrity  - Assess and document dressing/incision, wound bed, drain sites and surrounding tissue  - Implement wound care per orders  - Initiate isolation precautions as a

## 2021-08-14 NOTE — PLAN OF CARE
Assumed care of patient at 2200. Monitor on telemetry-paced, continuous pulse ox. IV fluids infusing. Patient complaining of nausea and generalized pain. MD notified. PRN meds provided.  Tube feeding restarted at 40ml/hr per patient request. Fall precaution

## 2021-08-14 NOTE — PROGRESS NOTES
[address] [facility] Gastroenterology     [address] MEDICAL GROUP - [facility] Patient Status:  Observation    10/9/1940 MRN [id number]   [facility] 3NE-A Attending Mia Fan MD   Hosp Day # 0 PCP [facility] kg/m². Gen: NAD, AOx3  HEENT: no scleral icterus, MMM; neck supple  CV: RRR, extremities in lower extremity are warm b/l  Resp: CTAB, no respiratory distress  Abd: Soft, non-tender, non-distended.  Gtube site intact, with good mobility, no pus or exudate 8/13.  Pt with recent complex closure of prior Gtube site    EGD 8/13: As above, her new G tube had extension to GJ, with J portion of tube clipped to proximal small intestine  Given this, feedings will continue and be delivered post-pyloric region, with m

## 2021-08-14 NOTE — PROGRESS NOTES
[facility] Cardiology Progress Note    Patient seen and examined.  Chart reviewed.     No chest pain or shortness of breath. Expecting to go home tomorrow - waiting for home health delivery.   Appears comfortable    BP 97/54   Pulse 59   Temp 97.9 °F (36.6 °C) (O NaCl infusion, , Intravenous, Continuous  acetaminophen (TYLENOL) tab 650 mg, 650 mg, Oral, Q6H PRN  morphINE sulfate (PF) 2 MG/ML injection 1 mg, 1 mg, Intravenous, Q2H PRN   Or  morphINE sulfate (PF) 2 MG/ML injection 2 mg, 2 mg, Intravenous, Q2H PRN   O

## 2021-08-14 NOTE — CM/SW NOTE
MSW spoke with RN. Patient is ready to be discharged today. MSW called Mook from [facility] at [phone]. They will deliver the pump today to the home. MSW spoke with Mauricio Goldberg at [facility]. They are unable to provide a nurse at the home today.

## 2021-08-15 VITALS
SYSTOLIC BLOOD PRESSURE: 102 MMHG | TEMPERATURE: 98 F | OXYGEN SATURATION: 98 % | WEIGHT: 99.5 LBS | HEIGHT: 64 IN | BODY MASS INDEX: 16.99 KG/M2 | DIASTOLIC BLOOD PRESSURE: 68 MMHG | HEART RATE: 63 BPM | RESPIRATION RATE: 18 BRPM

## 2021-08-15 LAB — GLUCOSE BLD-MCNC: 89 MG/DL (ref 70–99)

## 2021-08-15 NOTE — CM/SW NOTE
08/15/21 0900   Discharge disposition   Expected discharge disposition [address] [facility],[address] Floor Provider Jeffery Door   DME/Infusion Providers [address] [place] [address] Dr Uribe   Discharge transportation Private car

## 2021-08-15 NOTE — PLAN OF CARE
NURSING DISCHARGE NOTE    Discharged Home via Wheelchair. Accompanied by Spouse  Belongings Taken by patient/family. Patient given discharge paperwork. Discussed with . Dressing changed. Both verbalize understanding.   Supplies sent home w

## 2021-08-15 NOTE — PLAN OF CARE
Patient alert and oriented x4. Apaced on tele, hx pacemaker. Afebrile. VSS. Maintaining o2 sats on RA. Briefed/incontinent at times. C/o pain, prn morphine given with relief. Patient requested tylenol for headache.  Gtube-tolerating feedings well, no residu INTERVENTIONS  - Monitor WBC  - Administer growth factors as ordered  - Implement neutropenic guidelines  Outcome: Progressing     Problem: SAFETY ADULT - FALL  Goal: Free from fall injury  Description: INTERVENTIONS:  - Assess pt frequently for physical n PO as ordered and tolerated  - Nasogastric tube to low intermittent suction as ordered  - Evaluate effectiveness of ordered antiemetic medications  - Provide nonpharmacologic comfort measures as appropriate  - Advance diet as tolerated, if ordered  - Obtai needed  Outcome: Progressing  Goal: Incision(s), wounds(s) or drain site(s) healing without S/S of infection  Description: INTERVENTIONS:  - Assess and document risk factors for pressure ulcer development  - Assess and document skin integrity  - Assess and

## 2021-08-15 NOTE — CM/SW NOTE
Pt leaving shortly - waiting for transport. Called [facility] to confirm that an RN was coming out at 11am today. [facility] also delivered the TF to pt's home.

## 2021-08-15 NOTE — DISCHARGE SUMMARY
General Medicine Discharge Summary     Patient ID:  Daysi Lo  79year old  10/9/1940    Admit date: 8/11/2021    Discharge date and time:8/15/2021    Attending Physician: Yaneth Hale MD WORK  IP CONSULT TO SOCIAL WORK  NURSING CONSULT TO DIETITIAN  IP CONSULT TO SOCIAL WORK  IP CONSULT TO PHARMACY    Operative Procedures: Procedure(s) (LRB):  ESOPHAGOGASTRODUODENOSCOPY with J-tube placement and clips x2 (N/A)       Patient instructions: diet  Wound Care: as directed  Code Status: Full Code      Exam on day of discharge:      08/15/21  0740   BP: 102/68   Pulse: 63   Resp:    Temp: 98.4 °F (36.9 °C)       General: no acute distress, alert and oriented x 3  Heart: RRR  Lungs: clear bilatera

## 2021-08-17 ENCOUNTER — TELEPHONE (OUTPATIENT)
Dept: CARDIOLOGY | Age: 81
End: 2021-08-17

## 2021-08-17 ENCOUNTER — APPOINTMENT (OUTPATIENT)
Dept: GENERAL RADIOLOGY | Facility: HOSPITAL | Age: 81
End: 2021-08-17
Attending: EMERGENCY MEDICINE
Payer: MEDICARE

## 2021-08-17 ENCOUNTER — APPOINTMENT (OUTPATIENT)
Dept: CT IMAGING | Facility: HOSPITAL | Age: 81
End: 2021-08-17
Attending: EMERGENCY MEDICINE
Payer: MEDICARE

## 2021-08-17 ENCOUNTER — HOSPITAL ENCOUNTER (OUTPATIENT)
Facility: HOSPITAL | Age: 81
Setting detail: OBSERVATION
LOS: 1 days | Discharge: HOME HEALTH | End: 2021-08-18
Attending: EMERGENCY MEDICINE | Admitting: HOSPITALIST
Payer: MEDICARE

## 2021-08-17 DIAGNOSIS — K56.0 PARALYTIC ILEUS (HCC): ICD-10-CM

## 2021-08-17 DIAGNOSIS — K94.23 MALFUNCTION OF GASTROSTOMY TUBE (HCC): Primary | ICD-10-CM

## 2021-08-17 LAB
ALBUMIN SERPL-MCNC: 2.6 G/DL (ref 3.4–5)
ALBUMIN/GLOB SERPL: 0.8 {RATIO} (ref 1–2)
ALP LIVER SERPL-CCNC: 61 U/L
ALT SERPL-CCNC: 31 U/L
ANION GAP SERPL CALC-SCNC: 0 MMOL/L (ref 0–18)
BASOPHILS # BLD AUTO: 0.04 X10(3) UL (ref 0–0.2)
BASOPHILS NFR BLD AUTO: 0.8 %
BILIRUB SERPL-MCNC: 0.4 MG/DL (ref 0.1–2)
BUN BLD-MCNC: 11 MG/DL (ref 7–18)
CALCIUM BLD-MCNC: 8.5 MG/DL (ref 8.5–10.1)
CHLORIDE SERPL-SCNC: 112 MMOL/L (ref 98–112)
CO2 SERPL-SCNC: 29 MMOL/L (ref 21–32)
CREAT BLD-MCNC: 0.75 MG/DL
EOSINOPHIL # BLD AUTO: 0.17 X10(3) UL (ref 0–0.7)
EOSINOPHIL NFR BLD AUTO: 3.4 %
ERYTHROCYTE [DISTWIDTH] IN BLOOD BY AUTOMATED COUNT: 13.4 %
GLOBULIN PLAS-MCNC: 3.3 G/DL (ref 2.8–4.4)
GLUCOSE BLD-MCNC: 76 MG/DL (ref 70–99)
HCT VFR BLD AUTO: 41.2 %
HGB BLD-MCNC: 13.1 G/DL
IMM GRANULOCYTES # BLD AUTO: 0.02 X10(3) UL (ref 0–1)
IMM GRANULOCYTES NFR BLD: 0.4 %
LYMPHOCYTES # BLD AUTO: 1.08 X10(3) UL (ref 1–4)
LYMPHOCYTES NFR BLD AUTO: 21.9 %
M PROTEIN MFR SERPL ELPH: 5.9 G/DL (ref 6.4–8.2)
MCH RBC QN AUTO: 32.3 PG (ref 26–34)
MCHC RBC AUTO-ENTMCNC: 31.8 G/DL (ref 31–37)
MCV RBC AUTO: 101.5 FL
MONOCYTES # BLD AUTO: 0.48 X10(3) UL (ref 0.1–1)
MONOCYTES NFR BLD AUTO: 9.7 %
NEUTROPHILS # BLD AUTO: 3.14 X10 (3) UL (ref 1.5–7.7)
NEUTROPHILS # BLD AUTO: 3.14 X10(3) UL (ref 1.5–7.7)
NEUTROPHILS NFR BLD AUTO: 63.8 %
OSMOLALITY SERPL CALC.SUM OF ELEC: 290 MOSM/KG (ref 275–295)
PLATELET # BLD AUTO: 188 10(3)UL (ref 150–450)
RBC # BLD AUTO: 4.06 X10(6)UL
SARS-COV-2 RNA RESP QL NAA+PROBE: NOT DETECTED
SODIUM SERPL-SCNC: 141 MMOL/L (ref 136–145)
WBC # BLD AUTO: 4.9 X10(3) UL (ref 4–11)

## 2021-08-17 PROCEDURE — 99220 INITIAL OBSERVATION CARE,LEVL III: CPT | Performed by: INTERNAL MEDICINE

## 2021-08-17 PROCEDURE — 74177 CT ABD & PELVIS W/CONTRAST: CPT | Performed by: EMERGENCY MEDICINE

## 2021-08-17 PROCEDURE — 74019 RADEX ABDOMEN 2 VIEWS: CPT | Performed by: EMERGENCY MEDICINE

## 2021-08-17 RX ORDER — SODIUM CHLORIDE 9 MG/ML
INJECTION, SOLUTION INTRAVENOUS CONTINUOUS
Status: CANCELLED | OUTPATIENT
Start: 2021-08-17 | End: 2021-08-17

## 2021-08-17 RX ORDER — ONDANSETRON 2 MG/ML
4 INJECTION INTRAMUSCULAR; INTRAVENOUS EVERY 6 HOURS PRN
Status: DISCONTINUED | OUTPATIENT
Start: 2021-08-17 | End: 2021-08-18

## 2021-08-17 RX ORDER — ALBUTEROL SULFATE 2.5 MG/3ML
2.5 SOLUTION RESPIRATORY (INHALATION) 2 TIMES DAILY
Status: DISCONTINUED | OUTPATIENT
Start: 2021-08-17 | End: 2021-08-18

## 2021-08-17 RX ORDER — METOCLOPRAMIDE HYDROCHLORIDE 5 MG/ML
10 INJECTION INTRAMUSCULAR; INTRAVENOUS EVERY 8 HOURS PRN
Status: DISCONTINUED | OUTPATIENT
Start: 2021-08-17 | End: 2021-08-18

## 2021-08-17 RX ORDER — SODIUM CHLORIDE 9 MG/ML
INJECTION, SOLUTION INTRAVENOUS CONTINUOUS
Status: DISCONTINUED | OUTPATIENT
Start: 2021-08-17 | End: 2021-08-18

## 2021-08-17 RX ORDER — ACETAMINOPHEN AND CODEINE PHOSPHATE 120; 12 MG/5ML; MG/5ML
5 SOLUTION ORAL ONCE
Status: COMPLETED | OUTPATIENT
Start: 2021-08-17 | End: 2021-08-17

## 2021-08-17 NOTE — ED INITIAL ASSESSMENT (HPI)
Pt complains of feeding tube not flushing this morning, was working well last night but today unable to flush tube.

## 2021-08-17 NOTE — ED PROVIDER NOTES
Patient Seen in: [facility] Emergency Department      History   Patient presents with:  Cath Tube Problem    Stated Complaint: feeding tube clogged    HPI/Subjective:   HPI    The patient is an 23-year-old female who is feeding tube dependent, and w [employer] model;[id number];serial#[id number] dual chamber   • EGD     • IR ANGIOGRAM CEREBRAL CAROTID UNILATERAL  10/26/2020        • IR INTRA ARTERIAL STROKE INTERVENTION  10/26/2020        • LUMPECTOMY RIGHT     • OTHER      surgery to remove cyst in throat 1980' any surrounding signs of infection or drainage. Normoactive bowel sounds. Soft, nondistended. No HSM or masses. Nontender throughout abdomen to superficial and deep palpation throughout all 4 quadrants, epigastrium and suprapubic regions.  No CVA tendernes the dome of the diaphragm to     the pubic symphysis with non-ionic intravenous contrast material. Post     contrast coronal MPR imaging was performed. Dose reduction techniques     were used.  Dose information is     transmitted to the [facility] [employer] assessed with CT.    BONES:  Stable appearance of a compression deformity noted at L4.     LUNG BASES:  Limited views of the chest demonstrate interval development     of small left and small to moderate right pleural effusions with     associated basil Trice Rogel MD on 8/17/2021 at 1:54 PM        XR ABDOMEN OBSTRUCTIVE SERIES ROUTINE(2 VW)(CPT=74019)   Final Result    PROCEDURE:  XR ABDOMEN OBSTRUCTIVE SERIES ROUTINE(2 VW)(CPT=74019)         TECHNIQUE:  2 view obstructive series of the abdomen an known surgical history is necessary. Blunting right costophrenic angle is consistent with right effusion. Diffuse air-filled distention of small and large bowel is consistent with     ileus.                    Dictated by (CST): Dee Dee Hale

## 2021-08-17 NOTE — ED QUICK NOTES
First contact with patient, care assumed. I spoke with pharmacy and explained the difficulty with dissolving the Zenpep particles. She will prepare another dose with bicarbonate and will send me another dose.

## 2021-08-17 NOTE — ED QUICK NOTES
Abl;e to flush 20 ml of sodium bicarb through gtube without issue.  Will let sit and then try another flush and pulling back

## 2021-08-17 NOTE — CONSULTS
[facility] Gastroenterology     [address] Patient Status:  Emergency    10/9/1940 MRN [id number]   Location [facility] Attending Gumaro Palomares MD   [facility]     11/2020/   pt is receving PT and OT therapy- using walker but wheelchair if long distances   • Wears glasses    • Weight loss        Past Surgical History:  Past Surgical History:   Procedure Laterality Date   • CARDIAC PACEMAKER PLACEMENT intact, no leakage  Skin: No rashes. Jaundice absent. Neuro: No focal deficits. Alert and oriented to name, location and time.      Diagnostic Data:      Labs:  Recent Labs   Lab 08/12/21  0704 08/12/21  1732 08/13/21  0843 08/14/21  0741 08/17/21  1128 appropriately    However based on imaging it appears that the J portion of the tube has now migrated back into the stomach. I discussed the case with surgery service Dr. Tabby Maloney. She has a history of prior G-tube site leakage.   Therefore based on her discu

## 2021-08-17 NOTE — H&P
[facility]  History and Physical     Luz Marina Darlingkade Patient Status:  Emergency    10/9/1940 MRN [id number]   Location [address] Attending Shawnee Palomares MD   Hosp Day # 0 PCP Gucci Snyder     Chief Complaint: UNILATERAL  10/26/2020        • IR INTRA ARTERIAL STROKE INTERVENTION  10/26/2020        • LUMPECTOMY RIGHT     • OTHER      surgery to remove cyst in throat 1980's   • UPPER GI ENDOSCOPY,EXAM         Social History:  reports that she quit smoking about 26 Oral Solution, 5-10 mL by Per G Tube route 3 (three) times daily as needed. , Disp: , Rfl:   nystatin 199046 UNIT/GM External Cream, Apply 1 Application topically 2 (two) times daily.  To affected area , Disp: , Rfl:   gemfibrozil 600 MG Oral Tab, 300 mg b < > 93 77 76   BUN 23*   < > 18 13 11   CREATSERUM 0.94   < > 0.75 0.78 0.75   GFRAA 66   < > 86 82 86   GFRNAA 57*   < > 75 72 75   CA 8.4*   < > 8.4* 8.2* 8.5   ALB  --   --   --   --  2.6*      < > 145 145 141   K 3.8  --  3.6 3.4*  --

## 2021-08-18 ENCOUNTER — ANESTHESIA EVENT (OUTPATIENT)
Dept: ENDOSCOPY | Facility: HOSPITAL | Age: 81
End: 2021-08-18
Payer: MEDICARE

## 2021-08-18 ENCOUNTER — ANESTHESIA (OUTPATIENT)
Dept: ENDOSCOPY | Facility: HOSPITAL | Age: 81
End: 2021-08-18
Payer: MEDICARE

## 2021-08-18 VITALS
HEART RATE: 60 BPM | TEMPERATURE: 98 F | BODY MASS INDEX: 19.52 KG/M2 | WEIGHT: 99.44 LBS | SYSTOLIC BLOOD PRESSURE: 137 MMHG | RESPIRATION RATE: 14 BRPM | DIASTOLIC BLOOD PRESSURE: 58 MMHG | OXYGEN SATURATION: 96 % | HEIGHT: 60 IN

## 2021-08-18 LAB
ANION GAP SERPL CALC-SCNC: 5 MMOL/L (ref 0–18)
BASOPHILS # BLD AUTO: 0.04 X10(3) UL (ref 0–0.2)
BASOPHILS NFR BLD AUTO: 0.7 %
BUN BLD-MCNC: 9 MG/DL (ref 7–18)
CALCIUM BLD-MCNC: 8 MG/DL (ref 8.5–10.1)
CHLORIDE SERPL-SCNC: 112 MMOL/L (ref 98–112)
CO2 SERPL-SCNC: 26 MMOL/L (ref 21–32)
CREAT BLD-MCNC: 0.64 MG/DL
EOSINOPHIL # BLD AUTO: 0.24 X10(3) UL (ref 0–0.7)
EOSINOPHIL NFR BLD AUTO: 4 %
ERYTHROCYTE [DISTWIDTH] IN BLOOD BY AUTOMATED COUNT: 13.2 %
GLUCOSE BLD-MCNC: 125 MG/DL (ref 70–99)
GLUCOSE BLD-MCNC: 217 MG/DL (ref 70–99)
GLUCOSE BLD-MCNC: 57 MG/DL (ref 70–99)
HCT VFR BLD AUTO: 37.5 %
HGB BLD-MCNC: 11.7 G/DL
IMM GRANULOCYTES # BLD AUTO: 0.02 X10(3) UL (ref 0–1)
IMM GRANULOCYTES NFR BLD: 0.3 %
INR BLD: 1.17 (ref 0.89–1.11)
LYMPHOCYTES # BLD AUTO: 1.11 X10(3) UL (ref 1–4)
LYMPHOCYTES NFR BLD AUTO: 18.4 %
MCH RBC QN AUTO: 32.1 PG (ref 26–34)
MCHC RBC AUTO-ENTMCNC: 31.2 G/DL (ref 31–37)
MCV RBC AUTO: 103 FL
MONOCYTES # BLD AUTO: 0.64 X10(3) UL (ref 0.1–1)
MONOCYTES NFR BLD AUTO: 10.6 %
NEUTROPHILS # BLD AUTO: 3.98 X10 (3) UL (ref 1.5–7.7)
NEUTROPHILS # BLD AUTO: 3.98 X10(3) UL (ref 1.5–7.7)
NEUTROPHILS NFR BLD AUTO: 66 %
OSMOLALITY SERPL CALC.SUM OF ELEC: 292 MOSM/KG (ref 275–295)
PLATELET # BLD AUTO: 189 10(3)UL (ref 150–450)
POTASSIUM SERPL-SCNC: 3.3 MMOL/L (ref 3.5–5.1)
PSA SERPL DL<=0.01 NG/ML-MCNC: 15.2 SECONDS (ref 12.2–14.5)
RBC # BLD AUTO: 3.64 X10(6)UL
SODIUM SERPL-SCNC: 143 MMOL/L (ref 136–145)
WBC # BLD AUTO: 6 X10(3) UL (ref 4–11)

## 2021-08-18 PROCEDURE — 99217 OBSERVATION CARE DISCHARGE: CPT | Performed by: INTERNAL MEDICINE

## 2021-08-18 PROCEDURE — 0DH63UZ INSERTION OF FEEDING DEVICE INTO STOMACH, PERCUTANEOUS APPROACH: ICD-10-PCS | Performed by: STUDENT IN AN ORGANIZED HEALTH CARE EDUCATION/TRAINING PROGRAM

## 2021-08-18 RX ORDER — LANSOPRAZOLE 30 MG/1
30 TABLET, ORALLY DISINTEGRATING, DELAYED RELEASE ORAL 2 TIMES DAILY
Qty: 60 TABLET | Refills: 1 | Status: SHIPPED | OUTPATIENT
Start: 2021-08-18 | End: 2021-09-17

## 2021-08-18 RX ORDER — AMIODARONE HYDROCHLORIDE 200 MG/1
200 TABLET ORAL DAILY
Refills: 0 | Status: SHIPPED | COMMUNITY
Start: 2021-08-18

## 2021-08-18 RX ORDER — KETOROLAC TROMETHAMINE 15 MG/ML
15 INJECTION, SOLUTION INTRAMUSCULAR; INTRAVENOUS EVERY 6 HOURS PRN
Status: DISCONTINUED | OUTPATIENT
Start: 2021-08-18 | End: 2021-08-18

## 2021-08-18 RX ORDER — LIDOCAINE HYDROCHLORIDE 10 MG/ML
INJECTION, SOLUTION EPIDURAL; INFILTRATION; INTRACAUDAL; PERINEURAL AS NEEDED
Status: DISCONTINUED | OUTPATIENT
Start: 2021-08-18 | End: 2021-08-18 | Stop reason: SURG

## 2021-08-18 RX ORDER — DEXTROSE MONOHYDRATE 25 G/50ML
50 INJECTION, SOLUTION INTRAVENOUS AS NEEDED
Status: DISCONTINUED | OUTPATIENT
Start: 2021-08-18 | End: 2021-08-18

## 2021-08-18 RX ADMIN — LIDOCAINE HYDROCHLORIDE 50 MG: 10 INJECTION, SOLUTION EPIDURAL; INFILTRATION; INTRACAUDAL; PERINEURAL at 15:50:00

## 2021-08-18 NOTE — DIETARY NOTE
[facility]  NUTRITION ASSESSMENT    Pt meets moderate malnutrition criteria.     CRITERIA FOR MALNUTRITION DIAGNOSIS:  Criteria for non-severe malnutrition diagnosis: chronic illness related to wt loss 10% in 6 months and energy intake less than75% for lb)  03/19/21 : 54.4 kg (120 lb)  03/10/21 : 54.9 kg (121 lb)  02/25/21 : 54.9 kg (121 lb)  02/11/21 : 56.2 kg (124 lb)    NUTRITION:  Diet: Orders Placed This Encounter      NPO    Percent Meals Eaten (last 3 days)     Date/Time Percent Meals Eaten (%)

## 2021-08-18 NOTE — DISCHARGE SUMMARY
[facility]PITAL[facility]  DISCHARGE SUMMARY     Bessy Batch Patient Status:  Inpatient    10/9/1940 MRN [id number]   [facility] 3SW-A Attending Ramonita Houston, [facility] Day # 1 PCP Betzy Jones     Date of Admission: 2021  Date of Sierra Black Refills: 0     Lansoprazole 30 MG Tbdd  Commonly known as: Prevacid SoluTab  What changed:   · medication strength  · how much to take  · how to take this  · when to take this  · additional instructions      30 mg by Gastric Tube route 2 (two) times a day. [address] [employer], [phone], [phone]  [address] [id number]    Phone: [phone]   · Lansoprazole 30 MG Tbdd         [place]    Follow-up appointment:   Elidia Thomas,

## 2021-08-18 NOTE — ANESTHESIA POSTPROCEDURE EVALUATION
[facility] Patient Status:  Observation   Age/Gender 79year old female MRN [id number]   Location [id number] [place] [address] Attending Minerva Puentes, [address] [facility] [address] Day # 1 PCP Gucci Snyder       Anesthesia Post-op Note

## 2021-08-18 NOTE — CM/SW NOTE
08/18/21 1200   CM/SW Referral Data   Referral Source Social Work (self-referral)   Reason for Referral Discharge planning   Informant Patient   Patient Info   Patient's Home Environment Condo/Apt with elevator   Number of Levels in Home 1   Patient julio

## 2021-08-18 NOTE — PROGRESS NOTES
NURSING ADMISSION NOTE      Patient admitted via Cart alice ER, accompanied by her . Oriented to room. Safety precautions initiated. Bed in low position. Call light in reach.

## 2021-08-18 NOTE — ANESTHESIA PREPROCEDURE EVALUATION
PRE-OP EVALUATION    Patient Name: Trygve Felty    Admit Diagnosis: Paralytic ileus ([facility] [address] [K56.0]  Malfunction of gastrostomy tube ([facility] [address] [K94.23]    Pre-op Diagnosis: Abdominal pain [R10.9]    ESOPHAGOGASTRODUODENOSCOPY (EGD)WITH GASTROJEJUNOSTOMY REPO 4 (four) times daily. , Disp: , Rfl:   Ondansetron HCl (ZOFRAN) 4 mg tablet, 4 mg by Per G Tube route every 4 (four) hours as needed. , Disp: , Rfl:   Lansoprazole (PREVACID SOLUTAB) 15 MG Oral Tablet Delayed Release Dispersible, See Admin Instructions.  Zahraa Carmona Laterality Date   • CARDIAC PACEMAKER PLACEMENT  12/18/2013    [employer] model;[id number];serial#[id number] dual chamber   • EGD     • IR ANGIOGRAM CEREBRAL CAROTID UNILATERAL  10/26/2020        • IR INTRA ARTERIAL STROKE INTERVENTION  10/26/2020        • Timothy Moreau given multiple opportunities to ask questions, and expressed understanding of our plan and agreement.      Plan/risks discussed with: patient                Present on Admission:  **None**

## 2021-08-18 NOTE — CM/SW NOTE
Spoke with Helen Zhang from [address] [place] [address] who spoke with pt's  and caregiver. She expressed concern that she does not believe pt's family was using the TF pump correctly at home and pt was not receiving all of her prescribed caloric intake.   She will need

## 2021-08-18 NOTE — CM/SW NOTE
Patient failed inpatient criteria. Second level of review completed and supports observation. UR committee in agreement. Discussed with Dr. Andrew Mendoza  who approves observation status. MOON given to the patient and order written.

## 2021-08-18 NOTE — OPERATIVE REPORT
EGD Operative Report  Patient Name: Zena Iglesias  YOB: 1940  MRN: [id number]  Procedure: Esophagogastroduodenoscopy (EGD)  With GJ tube manipulation  Pre-operative Diagnosis & Indication:   1. Malnutrition  2.  Kinked GJ tube extension, PE Once adequate sedation was achieved, a bite block was placed and a lubricated tip of an Olympus video upper endoscope was inserted through the oropharynx and gently manipulated through the esophagus into the stomach and the second portion of the duodenum. intestine.      Recommendations:  - Post EGD precautions, watch for bleeding, infection, perforation, adverse drug reactions   - Okay to use GJ tube for feedings; if patient with persistent leakage from tube, would need to consider discontinuation of tube f

## 2021-08-18 NOTE — PROGRESS NOTES
[facility]  Progress Note     Doc [place] Patient Status:  Inpatient    10/9/1940 MRN [id number]   [facility] 3SW-A Attending Lazarus Handy, [address] Day # 1 PCP Meena Harkins     Chief Complaint: malfunctioning [facility] tube    S 145 141 143   K 3.6  --  3.4*  --  3.3*   *   < > 117* 112 112   CO2 26.0   < > 26.0 29.0 26.0   ALKPHO  --   --   --  61  --    ALT  --   --   --  31  --    BILT  --   --   --  0.4  --    TP  --   --   --  5.9*  --     < > = values in this interval Initial Certification    Patient will require inpatient services that will reasonably be expected to span two midnight's based on the clinical documentation in H+P.    Based on patients current state of illness, I anticipate that, after discharge, patient w

## 2021-08-18 NOTE — PLAN OF CARE
Patient returned from EGD, doing well, denies pain. Dressing around G-tube dry and intact. K-rider infusing. Home today,  at bedside.

## 2021-08-18 NOTE — PLAN OF CARE
Pt remains NPO prior to EGD. Pt states she wants to go home after procedure. Medicated for migraine with relief.  at bedside. Will continue to monitor.

## 2021-08-18 NOTE — PLAN OF CARE
Pt alert and oriented. VSS on Ra, , IS. IVFS infusing per orders. No complaints of pain. Voiding freely. Plan to remain NPO to prepare for EGD in AM. Consent signed and on chart. Safety precautions in place, alarm on, call light in reach.  Providing educ

## 2021-08-22 ENCOUNTER — APPOINTMENT (OUTPATIENT)
Dept: GENERAL RADIOLOGY | Facility: HOSPITAL | Age: 81
DRG: 516 | End: 2021-08-22
Payer: MEDICARE

## 2021-08-22 ENCOUNTER — HOSPITAL ENCOUNTER (INPATIENT)
Facility: HOSPITAL | Age: 81
LOS: 12 days | Discharge: NURSING, REHAB OR LONG-TERM CARE | DRG: 516 | End: 2021-09-03
Attending: EMERGENCY MEDICINE | Admitting: HOSPITALIST
Payer: MEDICARE

## 2021-08-22 DIAGNOSIS — S32.020A COMPRESSION FRACTURE OF L2 VERTEBRA, INITIAL ENCOUNTER (HCC): Primary | ICD-10-CM

## 2021-08-22 DIAGNOSIS — R26.2 INABILITY TO WALK: ICD-10-CM

## 2021-08-22 LAB
ALBUMIN SERPL-MCNC: 2.9 G/DL (ref 3.4–5)
ALBUMIN/GLOB SERPL: 0.9 {RATIO} (ref 1–2)
ALP LIVER SERPL-CCNC: 65 U/L
ALT SERPL-CCNC: 36 U/L
ANION GAP SERPL CALC-SCNC: 0 MMOL/L (ref 0–18)
AST SERPL-CCNC: 29 U/L (ref 15–37)
BASOPHILS # BLD AUTO: 0.02 X10(3) UL (ref 0–0.2)
BASOPHILS NFR BLD AUTO: 0.3 %
BILIRUB SERPL-MCNC: 0.5 MG/DL (ref 0.1–2)
BUN BLD-MCNC: 20 MG/DL (ref 7–18)
CALCIUM BLD-MCNC: 8.6 MG/DL (ref 8.5–10.1)
CHLORIDE SERPL-SCNC: 110 MMOL/L (ref 98–112)
CO2 SERPL-SCNC: 29 MMOL/L (ref 21–32)
CREAT BLD-MCNC: 0.86 MG/DL
EOSINOPHIL # BLD AUTO: 0.02 X10(3) UL (ref 0–0.7)
EOSINOPHIL NFR BLD AUTO: 0.3 %
ERYTHROCYTE [DISTWIDTH] IN BLOOD BY AUTOMATED COUNT: 13.5 %
GLOBULIN PLAS-MCNC: 3.2 G/DL (ref 2.8–4.4)
GLUCOSE BLD-MCNC: 75 MG/DL (ref 70–99)
HCT VFR BLD AUTO: 41.6 %
HGB BLD-MCNC: 13.1 G/DL
IMM GRANULOCYTES # BLD AUTO: 0.04 X10(3) UL (ref 0–1)
IMM GRANULOCYTES NFR BLD: 0.6 %
LYMPHOCYTES # BLD AUTO: 0.76 X10(3) UL (ref 1–4)
LYMPHOCYTES NFR BLD AUTO: 11.1 %
M PROTEIN MFR SERPL ELPH: 6.1 G/DL (ref 6.4–8.2)
MCH RBC QN AUTO: 31.8 PG (ref 26–34)
MCHC RBC AUTO-ENTMCNC: 31.5 G/DL (ref 31–37)
MCV RBC AUTO: 101 FL
MONOCYTES # BLD AUTO: 0.48 X10(3) UL (ref 0.1–1)
MONOCYTES NFR BLD AUTO: 7 %
NEUTROPHILS # BLD AUTO: 5.53 X10 (3) UL (ref 1.5–7.7)
NEUTROPHILS # BLD AUTO: 5.53 X10(3) UL (ref 1.5–7.7)
NEUTROPHILS NFR BLD AUTO: 80.7 %
OSMOLALITY SERPL CALC.SUM OF ELEC: 289 MOSM/KG (ref 275–295)
PLATELET # BLD AUTO: 218 10(3)UL (ref 150–450)
POTASSIUM SERPL-SCNC: 4.3 MMOL/L (ref 3.5–5.1)
RBC # BLD AUTO: 4.12 X10(6)UL
SARS-COV-2 RNA RESP QL NAA+PROBE: NOT DETECTED
SODIUM SERPL-SCNC: 139 MMOL/L (ref 136–145)
WBC # BLD AUTO: 6.9 X10(3) UL (ref 4–11)

## 2021-08-22 PROCEDURE — 72100 X-RAY EXAM L-S SPINE 2/3 VWS: CPT

## 2021-08-22 PROCEDURE — 99223 1ST HOSP IP/OBS HIGH 75: CPT | Performed by: HOSPITALIST

## 2021-08-22 PROCEDURE — 72072 X-RAY EXAM THORAC SPINE 3VWS: CPT

## 2021-08-22 RX ORDER — ONDANSETRON 2 MG/ML
4 INJECTION INTRAMUSCULAR; INTRAVENOUS EVERY 6 HOURS PRN
Status: DISCONTINUED | OUTPATIENT
Start: 2021-08-22 | End: 2021-09-03

## 2021-08-22 RX ORDER — MORPHINE SULFATE 2 MG/ML
2 INJECTION, SOLUTION INTRAMUSCULAR; INTRAVENOUS EVERY 30 MIN PRN
Status: CANCELLED | OUTPATIENT
Start: 2021-08-22 | End: 2021-08-22

## 2021-08-22 RX ORDER — MORPHINE SULFATE 4 MG/ML
4 INJECTION, SOLUTION INTRAMUSCULAR; INTRAVENOUS EVERY 2 HOUR PRN
Status: DISCONTINUED | OUTPATIENT
Start: 2021-08-22 | End: 2021-09-03

## 2021-08-22 RX ORDER — ONDANSETRON 2 MG/ML
4 INJECTION INTRAMUSCULAR; INTRAVENOUS ONCE
Status: COMPLETED | OUTPATIENT
Start: 2021-08-22 | End: 2021-08-22

## 2021-08-22 RX ORDER — MORPHINE SULFATE 2 MG/ML
2 INJECTION, SOLUTION INTRAMUSCULAR; INTRAVENOUS EVERY 2 HOUR PRN
Status: DISCONTINUED | OUTPATIENT
Start: 2021-08-22 | End: 2021-09-03

## 2021-08-22 RX ORDER — ACETAMINOPHEN 325 MG/1
650 TABLET ORAL EVERY 4 HOURS PRN
Status: DISCONTINUED | OUTPATIENT
Start: 2021-08-22 | End: 2021-09-03

## 2021-08-22 RX ORDER — MORPHINE SULFATE 2 MG/ML
2 INJECTION, SOLUTION INTRAMUSCULAR; INTRAVENOUS ONCE
Status: COMPLETED | OUTPATIENT
Start: 2021-08-22 | End: 2021-08-22

## 2021-08-22 RX ORDER — ONDANSETRON 2 MG/ML
4 INJECTION INTRAMUSCULAR; INTRAVENOUS EVERY 4 HOURS PRN
Status: CANCELLED | OUTPATIENT
Start: 2021-08-22 | End: 2021-08-22

## 2021-08-22 RX ORDER — METOCLOPRAMIDE HYDROCHLORIDE 5 MG/ML
5 INJECTION INTRAMUSCULAR; INTRAVENOUS EVERY 8 HOURS PRN
Status: DISCONTINUED | OUTPATIENT
Start: 2021-08-22 | End: 2021-09-03

## 2021-08-22 RX ORDER — MORPHINE SULFATE 2 MG/ML
1 INJECTION, SOLUTION INTRAMUSCULAR; INTRAVENOUS EVERY 2 HOUR PRN
Status: DISCONTINUED | OUTPATIENT
Start: 2021-08-22 | End: 2021-09-03

## 2021-08-22 RX ORDER — HYDROCODONE BITARTRATE AND ACETAMINOPHEN 5; 325 MG/1; MG/1
2 TABLET ORAL EVERY 4 HOURS PRN
Status: DISCONTINUED | OUTPATIENT
Start: 2021-08-22 | End: 2021-08-24

## 2021-08-22 RX ORDER — MORPHINE SULFATE 4 MG/ML
4 INJECTION, SOLUTION INTRAMUSCULAR; INTRAVENOUS ONCE
Status: COMPLETED | OUTPATIENT
Start: 2021-08-22 | End: 2021-08-22

## 2021-08-22 RX ORDER — HYDROCODONE BITARTRATE AND ACETAMINOPHEN 5; 325 MG/1; MG/1
1 TABLET ORAL EVERY 4 HOURS PRN
Status: DISCONTINUED | OUTPATIENT
Start: 2021-08-22 | End: 2021-08-24

## 2021-08-22 NOTE — ED PROVIDER NOTES
Patient Seen in: [facility] Emergency Department      History   Patient presents with:  Fall    Stated Complaint: witnessed fall. no LOC +back pain tylenol 2 hours PTA.      HPI/Subjective:   HPI    The patient is an 29-year-old female with multiple resulted from surgery  for Cyst removal from throat per pt   • Renal disorder    • Scar tissue     of the Esophagus-    Has Gastrostomy tube   • Sputum production    • Stroke ([facility] [address]     11/2020/   pt is receving PT and OT therapy- using walker but wheelchai Atraumatic exam.  No lymphadenopathy, no hemotympanum. Supple neck. No midline neck tenderness, no pain with flexion, extension, rotation. Cardiovascular: No anterior chest wall tenderness.  regular rhythm without murmurs rubs or gallops, no peripheral CBC W/ DIFFERENTIAL[[id number]]          Abnormal            Final result                 Please view results for these tests on the individual orders.    REDRAW AST (SGOT) (P)   REDRAW POTASSIUM (P)        Blood was drawn peripheral IV access was e Levoscoliosis of lumbar spine is noted. Stable L4 compression deformity. There is a new moderate superior     endplate L2 compression deformity. Disc spaces are maintained throughout the lumbar spine. Facet hypertrophic changes are noted.     IMPR

## 2021-08-22 NOTE — H&P
[facility]  History and Physical     Pipe Ingram Patient Status:  Emergency    10/9/1940 MRN [id number]   Location [facility] Attending Ashley Palomares MD   Hosp Day # 0 PCP Minus [place]     Chief Complaint: s History:   Procedure Laterality Date   • CARDIAC PACEMAKER PLACEMENT  12/18/2013    [employer] model;[id number];serial#[id number] dual chamber   • EGD     • IR ANGIOGRAM CEREBRAL CAROTID UNILATERAL  10/26/2020        • IR INTRA ARTERIAL STROKE INTERVENTION  10/26/20 ezetimibe 10 MG Oral Tab, Take 10 mg by mouth nightly.  , Disp: , Rfl:   dilTIAZem HCl 60 MG Oral Tab, 60 mg by Per G Tube route 4 (four) times daily.   , Disp: , Rfl:   Ondansetron HCl (ZOFRAN) 4 mg tablet, 4 mg by Per G Tube route every 4 (four) hours a Psychiatric: Appropriate mood and affect.       Diagnostic Data:      Labs:  Recent Labs   Lab 08/17/21  1128 08/18/21  0455 08/22/21  1354   WBC 4.9 6.0 6.9   HGB 13.1 11.7* 13.1   .5* 103.0* 101.0*   .0 189.0 218.0   INR  --  1.17*  --

## 2021-08-23 LAB — GLUCOSE BLD-MCNC: 177 MG/DL (ref 70–99)

## 2021-08-23 PROCEDURE — 99221 1ST HOSP IP/OBS SF/LOW 40: CPT | Performed by: NEUROLOGICAL SURGERY

## 2021-08-23 PROCEDURE — 99232 SBSQ HOSP IP/OBS MODERATE 35: CPT | Performed by: HOSPITALIST

## 2021-08-23 RX ORDER — AMIODARONE HYDROCHLORIDE 200 MG/1
200 TABLET ORAL DAILY
Status: DISCONTINUED | OUTPATIENT
Start: 2021-08-23 | End: 2021-09-03

## 2021-08-23 RX ORDER — EZETIMIBE 10 MG/1
10 TABLET ORAL NIGHTLY
Status: DISCONTINUED | OUTPATIENT
Start: 2021-08-23 | End: 2021-09-03

## 2021-08-23 RX ORDER — GEMFIBROZIL 600 MG/1
300 TABLET, FILM COATED ORAL 2 TIMES DAILY
Status: DISCONTINUED | OUTPATIENT
Start: 2021-08-23 | End: 2021-09-03

## 2021-08-23 RX ORDER — MONTELUKAST SODIUM 10 MG/1
10 TABLET ORAL NIGHTLY
Status: DISCONTINUED | OUTPATIENT
Start: 2021-08-23 | End: 2021-09-03

## 2021-08-23 RX ORDER — DILTIAZEM HYDROCHLORIDE 60 MG/1
60 TABLET, FILM COATED ORAL 4 TIMES DAILY
Status: DISCONTINUED | OUTPATIENT
Start: 2021-08-23 | End: 2021-09-03

## 2021-08-23 NOTE — PROGRESS NOTES
[facility]  Progress Note     Zena Iglesias Patient Status:  Inpatient    10/9/1940 MRN [id number]   [facility] 3SW-A Attending Jose Bailey MD   Hosp Day # 1 PCP Thompson Wells     Chief Complaint: s/p fall    S: Patient stat mg/dL).     Recent Labs   Lab 08/18/21  0455   PTP 15.2*   INR 1.17*            COVID-19 Lab Results    COVID-19  Lab Results   Component Value Date    COVID19 Not Detected 08/22/2021    COVID19 Not Detected 08/17/2021    COVID19 Not Detected 07/31/2021 two midnight's based on the clinical documentation in H+P. Based on patients current state of illness, I anticipate that, after discharge, patient will require TBD.

## 2021-08-23 NOTE — PHYSICAL THERAPY NOTE
PT order received, chart reviewed, pt with new fracture noted, Hospitalist note stating possible brace, PT will await full POC for new fracture and clarification on need for brace or not. RN aware and in agreement.

## 2021-08-23 NOTE — PROGRESS NOTES
Pt is admitted for compression back fracture. Contact plus isolation for Hx of C diff. Aspiration precaution. Pt is AOx4. VSS, afebrile and c/o severe back pain. SpO2 maintained on 4L. Congested cough. . Denies any SOB. Tele-NSR. Cardiac control diet.

## 2021-08-23 NOTE — OCCUPATIONAL THERAPY NOTE
OT order received, chart reviewed, pt with new fracture noted, Hospitalist note stating possible brace, OT will await full POC for new fracture and clarification on need for brace or not. RN aware and in agreement.

## 2021-08-23 NOTE — PROGRESS NOTES
Pain Service    Discussed with patient, nurse, and Dr. Aric Slaughter, await CT/bone scan if patient unable to ambulate with brace. Consult Interventional Radiology if kyphoplasty needed.   Will sign off    Brittaney Parada RN

## 2021-08-23 NOTE — PLAN OF CARE
A/o x4. 2L//Is encouraged. Pt has chronic productive cough. SCD's/ankle pumps. G-tube in place. Abdomen soft and nontender. LBM 8/21. Voiding without difficulty. Pt had emesis this evening zofran given this evening. Pain managed with IV pain medication.

## 2021-08-23 NOTE — CONSULTS
[facility]  Neurosurgery Consult    [address] Patient Status:  Inpatient    10/9/1940 MRN [id number]   [facility] 3SW-A Attending Carlos Hoff MD   Hosp Day # 1 PCP NAHUM ONEAL     REASON FOR CONSULTATION:  L2 compression • Stroke [facility])     11/2020/   pt is receving PT and OT therapy- using walker but wheelchair if long distances   • Wears glasses    • Weight loss        PAST SURGICAL HISTORY:  Past Surgical History:   Procedure Laterality Date   • CARDIAC PACEMAKER PLACE Take 2 mL by nebulization 2 (two) times a day., Disp: , Rfl: , 8/21/2021 at Unknown time  ezetimibe 10 MG Oral Tab, Take 10 mg by mouth nightly.  , Disp: , Rfl: , 8/21/2021 at 2100  dilTIAZem HCl 60 MG Oral Tab, 60 mg by Per G Tube route 4 (four) times joshua PRN  morphINE sulfate (PF) 2 MG/ML injection 1 mg, 1 mg, Intravenous, Q2H PRN   Or  morphINE sulfate (PF) 2 MG/ML injection 2 mg, 2 mg, Intravenous, Q2H PRN   Or  morphINE sulfate (PF) 4 MG/ML injection 4 mg, 4 mg, Intravenous, Q2H PRN  ondansetron (Karyn Began body heights are maintained throughout the thoracic spine.       Disc spaces are maintained throughout the thoracic spine.       The upper thoracic vertebral bodies are not well visualized on lateral view.       IMPRESSION:       A definite compression def

## 2021-08-23 NOTE — PLAN OF CARE
NURSING ADMISSION NOTE      Patient admitted via Cart  Oriented to room. Safety precautions initiated. Bed in low position. Call light in reach.   Problem: Patient/Family Goals  Goal: Patient/Family Long Term Goal  Description: Patient's Long Term Go etc)  - Arrange for interpreters to assist at discharge as needed  - Consider post-discharge preferences of patient/family/discharge partner  - Complete POLST form as appropriate  - Assess patient's ability to be responsible for managing their own health

## 2021-08-23 NOTE — DIETARY NOTE
[facility]  NUTRITION ASSESSMENT    Pt meets moderate malnutrition criteria.     CRITERIA FOR MALNUTRITION DIAGNOSIS:  Criteria for non-severe malnutrition diagnosis: chronic illness related to wt loss 10% in 6 months and energy intake less than75% for from Last 20 Encounters:  08/22/21 : 45.5 kg (100 lb 5 oz)  08/20/21 : 45.5 kg (100 lb 4.8 oz)  08/17/21 : 45.1 kg (99 lb 6.8 oz)  08/12/21 : 45.1 kg (99 lb 8 oz)  08/03/21 : 47.6 kg (105 lb)  06/04/21 : 46.4 kg (102 lb 3.2 oz)  04/19/21 : 54.4 kg (120 lb)

## 2021-08-23 NOTE — PLAN OF CARE
Patient rates pain 9 out of 10. Managed with oral and iv pain medications. Patient prefers iv over g-tube meds, says that they make her nauseated.  While waiting for tube feeding pump (which came at 1700) I gave her bolus tube feeding per syringe of 60 ml,

## 2021-08-23 NOTE — SLP NOTE
ADULT SWALLOWING EVALUATION    ASSESSMENT    ASSESSMENT/OVERALL IMPRESSION:   Patient is an 80 y/o female know to this service from previous admissions. Patient is PEG tube dependent d/t prolonged history of esophageal dysphagia.  She consumes small amounts fracture of L2 lumbar vertebra (HCC)    Inability to walk      Past Medical History  Past Medical History:   Diagnosis Date   • Abdominal pain    • Acute, but ill-defined, cerebrovascular disease    • Arrhythmia     a-fib   • Bad breath    • Cancer ([facility] [address] Swallow: Within Functional Limits    Pharyngeal Phase of Swallow: Within Functional Limits           (Please note: Silent aspiration cannot be evaluated clinically.  Videofluoroscopic Swallow Study is required to rule-out silent aspiration.)    Esophageal P

## 2021-08-23 NOTE — CM/SW NOTE
08/23/21 1400   CM/SW Referral Data   Referral Source Social Work (self-referral)   Reason for Referral Discharge planning   Informant Patient   Patient Info   Patient's Current Mental Status at Time of Assessment Alert;Oriented   Patient's Home Environ

## 2021-08-24 ENCOUNTER — APPOINTMENT (OUTPATIENT)
Dept: GENERAL RADIOLOGY | Facility: HOSPITAL | Age: 81
DRG: 516 | End: 2021-08-24
Attending: INTERNAL MEDICINE
Payer: MEDICARE

## 2021-08-24 ENCOUNTER — APPOINTMENT (OUTPATIENT)
Dept: GENERAL RADIOLOGY | Facility: HOSPITAL | Age: 81
DRG: 516 | End: 2021-08-24
Attending: PODIATRIST
Payer: MEDICARE

## 2021-08-24 ENCOUNTER — APPOINTMENT (OUTPATIENT)
Dept: GENERAL RADIOLOGY | Facility: HOSPITAL | Age: 81
DRG: 516 | End: 2021-08-24
Attending: NURSE PRACTITIONER
Payer: MEDICARE

## 2021-08-24 LAB — GLUCOSE BLD-MCNC: 160 MG/DL (ref 70–99)

## 2021-08-24 PROCEDURE — 73630 X-RAY EXAM OF FOOT: CPT | Performed by: PODIATRIST

## 2021-08-24 PROCEDURE — 99221 1ST HOSP IP/OBS SF/LOW 40: CPT | Performed by: PODIATRIST

## 2021-08-24 PROCEDURE — 71045 X-RAY EXAM CHEST 1 VIEW: CPT | Performed by: INTERNAL MEDICINE

## 2021-08-24 PROCEDURE — 99232 SBSQ HOSP IP/OBS MODERATE 35: CPT | Performed by: HOSPITALIST

## 2021-08-24 PROCEDURE — 11721 DEBRIDE NAIL 6 OR MORE: CPT | Performed by: PODIATRIST

## 2021-08-24 PROCEDURE — 74019 RADEX ABDOMEN 2 VIEWS: CPT | Performed by: NURSE PRACTITIONER

## 2021-08-24 RX ORDER — DOCUSATE SODIUM 100 MG/1
100 CAPSULE, LIQUID FILLED ORAL 2 TIMES DAILY
Status: DISCONTINUED | OUTPATIENT
Start: 2021-08-24 | End: 2021-08-28

## 2021-08-24 RX ORDER — HYDROCODONE BITARTRATE AND ACETAMINOPHEN 5; 325 MG/1; MG/1
1 TABLET ORAL EVERY 6 HOURS PRN
Status: DISCONTINUED | OUTPATIENT
Start: 2021-08-24 | End: 2021-09-03

## 2021-08-24 RX ORDER — SODIUM PHOSPHATE, DIBASIC AND SODIUM PHOSPHATE, MONOBASIC 7; 19 G/133ML; G/133ML
1 ENEMA RECTAL ONCE AS NEEDED
Status: DISCONTINUED | OUTPATIENT
Start: 2021-08-24 | End: 2021-09-03

## 2021-08-24 RX ORDER — TRAMADOL HYDROCHLORIDE 50 MG/1
50 TABLET ORAL EVERY 6 HOURS PRN
Status: DISCONTINUED | OUTPATIENT
Start: 2021-08-24 | End: 2021-09-03

## 2021-08-24 RX ORDER — POLYETHYLENE GLYCOL 3350 17 G/17G
17 POWDER, FOR SOLUTION ORAL DAILY PRN
Status: DISCONTINUED | OUTPATIENT
Start: 2021-08-24 | End: 2021-09-03

## 2021-08-24 RX ORDER — BISACODYL 10 MG
10 SUPPOSITORY, RECTAL RECTAL
Status: DISCONTINUED | OUTPATIENT
Start: 2021-08-24 | End: 2021-09-03

## 2021-08-24 NOTE — OCCUPATIONAL THERAPY NOTE
OT order received, chart reviewed, attempted to see pt this AM, pt awaiting brace for use with G-tube, OT will follow up as schedule permits.

## 2021-08-24 NOTE — CONSULTS
[facility]    Report of Consultation    Raysa Valle Patient Status:  Inpatient    10/9/1940 MRN [id number]   [facility] ([place], [place]) 3SW-A Attending Samantha Griffith MD   Hosp Day # 2 DIYA Ohara     Date of Admission:  2021  Date o model;[id number];serial#[id number] dual chamber   • EGD     • IR ANGIOGRAM CEREBRAL CAROTID UNILATERAL  10/26/2020        • IR INTRA ARTERIAL STROKE INTERVENTION  10/26/2020        • LUMPECTOMY RIGHT     • OTHER      surgery to remove cyst in throat 1980's   • UP Daily  •  acetaminophen (TYLENOL) tab 650 mg, 650 mg, Oral, Q4H PRN **OR** [DISCONTINUED] HYDROcodone-acetaminophen (NORCO) 5-325 MG per tab 1 tablet, 1 tablet, Oral, Q4H PRN **OR** [DISCONTINUED] HYDROcodone-acetaminophen (NORCO) 5-325 MG per tab 2 tablet Technologist)  Pain to mid and lower back after fall today. Findings:  Levoscoliosis of the upper thoracic spine is noted. Vertebral body heights are maintained throughout the thoracic spine. Disc spaces are maintained throughout the thoracic spine.   Donita Jean Marya Sandhu MD on 8/11/2021 at 11:08 PM       CT ABDOMEN PELVIS IV CONTRAST, NO ORAL (ER)    Result Date: 8/17/2021  CONCLUSION:  A gastrojejunostomy tube is present which appears coiled within the stomach.   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 Today ordered x-rays 3 views of the left foot to assess whether or not there could be a fracture of the metatarsals on her left foot after her injury. We will await those results.     Kareem De La O DPM   8/24/2021  6:

## 2021-08-24 NOTE — PROGRESS NOTES
[facility]  Neurosurgery Progress Note    Daysi Lo Patient Status:  Inpatient    10/9/1940 MRN [id number]   [facility] 3SW-A Attending Corey Keen MD   Hosp Day # 2 PCP Cyndee Ruiz     Chief Complaint:  Patient presents

## 2021-08-24 NOTE — PLAN OF CARE
A/o x4. 2L//Is encouraged. Pt has chronic productive cough small amount of thin sputum. SCD's/ankle pumps.  G-tube in place and patent Osmolite 1.2 at 65mL/hr infusing pt had one episode of small amount of emesis pt states she has been having episodes of

## 2021-08-24 NOTE — PROGRESS NOTES
[facility]  Progress Note     Marycarmen Ilir Patient Status:  Inpatient    10/9/1940 MRN [id number]   [facility] 3SW-A Attending Daniel Renee MD   Hosp Day # 2 PCP Dominic Osgood     Chief Complaint: s/p fall    S: Patient with Labs   Lab 08/18/21  0455   PTP 15.2*   INR 1.17*            COVID-19 Lab Results    COVID-19  Lab Results   Component Value Date    COVID19 Not Detected 08/22/2021    COVID19 Not Detected 08/17/2021    COVID19 Not Detected 07/31/2021       Pro-Calcitonin

## 2021-08-24 NOTE — PHYSICAL THERAPY NOTE
PHYSICAL THERAPY EVALUATION - INPATIENT     Room Number: 368/368-A  Evaluation Date: 8/24/2021  Type of Evaluation: Initial  Physician Order: PT Eval and Treat    Presenting Problem: dx L2 compression fracture s/p fall at home; possible SBO   Reason fo cancer- right 2010   • Cataract    • COPD (chronic obstructive pulmonary disease) ([facility])     no O2   • Diarrhea, unspecified    • Difficult intubation     cannot identify why this is noted-see anesthesia record 1/28,1/11 no issues noted   • Esophageal reflu SUBJECTIVE  \"I'm supposed to wake my  up when I need to get up at night, but I wanted to let him sleep. \"     Patient self-stated goal is reduce the pain. OBJECTIVE  Precautions: Lumbar brace;Spine; Other (Comment) (CAREFUL G-TUBE!!!)  Fall - BASIC MOBILITY  How much difficulty does the patient currently have. ..  -   Turning over in bed (including adjusting bedclothes, sheets and blankets)?: A Lot   -   Sitting down on and standing up from a chair with arms (e.g., wheelchair, bedside commode, precautions. Pt able to stand about 30 sec with max assist and t/f to b/s chair, and then becomes more total assist. This is likely not functional for standing imaging, and alerted rn to this.      Staff communications: in person with rnShimon benefit from skilled inpatient PT to address the above deficits to assist patient in returning to prior level of function. DISCHARGE RECOMMENDATIONS  PT Discharge Recommendations: Sub-acute rehabilitation      PLAN  PT Treatment Plan: Bed mobility; Body

## 2021-08-24 NOTE — CONSULTS
[facility]                       Gastroenterology Consultation-[facility]    Jesenia Car Patient Status:  Inpatient    10/9/1940 MRN [id number]   [facility] 3SW-A Attending Galina Alvarado MD   Hosp Day # 2 PCP NII Pneumonia due to organism    • Problems with swallowing     since 1995- due to scar tissue resulted from surgery  for Cyst removal from throat per pt   • Renal disorder    • Scar tissue     of the Esophagus-    Has Gastrostomy tube   • Sputum production Once  [COMPLETED] morphINE sulfate (PF) 4 MG/ML injection 4 mg, 4 mg, Intravenous, Once  acetaminophen (TYLENOL) tab 650 mg, 650 mg, Oral, Q4H PRN  morphINE sulfate (PF) 2 MG/ML injection 1 mg, 1 mg, Intravenous, Q2H PRN   Or  morphINE sulfate (PF) 2 MG/ML tinnitus           Neurologic: + CVA  PE: /70 (BP Location: Right arm)   Pulse 96   Temp 98.5 °F (36.9 °C) (Oral)   Resp 18   Wt 100 lb 5 oz (45.5 kg)   SpO2 91%   BMI 16.95 kg/m²   Gen: AAO x 3, able to speak in complete sentences  HENT: EOMI, PERRL infection (pt with hx of C Diff and recently required multiple admissions), ileus, obstruction     Recommendations:     1. Stool for C Diff and GI PCR panel; enteric isolation   2. Obstructive series to r/o ileus, obstruction, assess stool burden  3.  [place]

## 2021-08-25 ENCOUNTER — APPOINTMENT (OUTPATIENT)
Dept: CT IMAGING | Facility: HOSPITAL | Age: 81
DRG: 516 | End: 2021-08-25
Attending: HOSPITALIST
Payer: MEDICARE

## 2021-08-25 LAB
ADENOVIRUS F 40/41 PCR: NEGATIVE
ASTROVIRUS PCR: NEGATIVE
C CAYETANENSIS DNA SPEC QL NAA+PROBE: NEGATIVE
C DIFF TOX B STL QL: NEGATIVE
CAMPY SP DNA.DIARRHEA STL QL NAA+PROBE: NEGATIVE
CRYPTOSP DNA SPEC QL NAA+PROBE: NEGATIVE
EAEC PAA PLAS AGGR+AATA ST NAA+NON-PRB: NEGATIVE
EC STX1+STX2 + H7 FLIC SPEC NAA+PROBE: NEGATIVE
ENTAMOEBA HISTOLYTICA PCR: NEGATIVE
EPEC EAE GENE STL QL NAA+NON-PROBE: NEGATIVE
ETEC LTA+ST1A+ST1B TOX ST NAA+NON-PROBE: NEGATIVE
GIARDIA LAMBLIA PCR: NEGATIVE
NOROVIRUS GI/GII PCR: NEGATIVE
P SHIGELLOIDES DNA STL QL NAA+PROBE: NEGATIVE
ROTAVIRUS A PCR: NEGATIVE
SALMONELLA DNA SPEC QL NAA+PROBE: NEGATIVE
SAPOVIRUS PCR: NEGATIVE
SHIGELLA SP+EIEC IPAH ST NAA+NON-PROBE: NEGATIVE
V CHOLERAE DNA SPEC QL NAA+PROBE: NEGATIVE
VIBRIO DNA SPEC NAA+PROBE: NEGATIVE
YERSINIA DNA SPEC NAA+PROBE: NEGATIVE

## 2021-08-25 PROCEDURE — 72131 CT LUMBAR SPINE W/O DYE: CPT | Performed by: HOSPITALIST

## 2021-08-25 PROCEDURE — 99232 SBSQ HOSP IP/OBS MODERATE 35: CPT | Performed by: HOSPITALIST

## 2021-08-25 NOTE — PLAN OF CARE
A/o x4. 2L//Is encouraged. Pt has chronic productive cough small amount of thin sputum. SCD's/ankle pumps.  G-tube in place unable to flush or get residual. Xray abdomen results paged oncall GI  notified about PEG tube and abdomen results advised

## 2021-08-25 NOTE — PROGRESS NOTES
GJ tube flushing without difficulty. Spoke with Dr Pee Koenig and received orders to use GJ tube today and resume tube feds. 1430 - Pt has an episode of emesis from tube feedings. Tube feeding stopped. Dr Pee Koenig paged. Spoke with Dr Sierra Hernandez.  Received ord

## 2021-08-25 NOTE — PLAN OF CARE
A&O x4. VSS on 2L O2, chronic cough with clear sputum production. IS encouraged. Verbalizes understanding of IS completion 10 times per hour. SCD's on bilaterally. G-tube feeds paused since this AM. No further episodes of emesis.  Pt drinking small sips of

## 2021-08-25 NOTE — PROGRESS NOTES
[facility]  Progress Note     Geoff Bianca Patient Status:  Inpatient    10/9/1940 MRN [id number]   [facility] 3SW-A Attending Stefano Rodriguez MD   Hosp Day # 3 PCP Sanket Goode     Chief Complaint: s/p fall    S: Patient with g Pro-Calcitonin  No results for input(s): PCT in the last 168 hours. Cardiac  No results for input(s): TROP, PBNP in the last 168 hours. Creatinine Kinase  No results for input(s): CK in the last 168 hours.     Inflammatory Markers  No results fo

## 2021-08-25 NOTE — PROGRESS NOTES
Received call from IR stating pt will need MRI prior to kyphoplasty. Dr Marizol Disla at bedside. MRI ordered. Spoke with pt who has pacemaker that is not MRI compatable.  Spoke with IR and they will need CT lumbar spine and bone scan of lumbar spine before pre

## 2021-08-25 NOTE — CM/SW NOTE
MSW met with the patient at bedside to discuss recommendation for [facility]. She is aware and requested MJ. MSW informed her that [facility]J is Acute rehab and MSW would have to discuss with MD if they feel PMR consult would be beneficial.  MSW paged Dr. Willa Morris.   In th

## 2021-08-25 NOTE — PROGRESS NOTES
Order received to place NG per  with GI. NG placed tube at 54. Pt tolerated fairly well. Checked residual after NG placed. Xray to verify placement ordered and notified.      2211 Xray at bedside to verify placement  2211 CNGILBERTU RN at bedside to shahida

## 2021-08-25 NOTE — PROGRESS NOTES
Dr Justus Stratton paged with x-ray results of L foot. Spoke with Dr Justus Stratton. Received orders for surgical shoe to L foot. Pt to be WBAT only to bathroom with surgical shoe. 30-40 steps/day per Dr Justus Stratton.

## 2021-08-25 NOTE — PLAN OF CARE
Pt A&Ox4. On 3L O2 via nasal cannula. VSS. Pt with GJ tube. Tube feedings resumed today - pt had an episode of emesis. Tube feedings stopped per MD. Pt had BM today. C-diff negative. Brief in place. Voiding via purewick. Kyphoplasty ordered.  CT lumbar spin

## 2021-08-25 NOTE — PROGRESS NOTES
Occupational Therapy    Orders received and chart review completed. Spoke with RN this morning who is awaiting clarification from MD re: L foot xray and spinal xray and how to proceed with weight-bearing status and mobility.  Will hold eval for now and re-a

## 2021-08-26 ENCOUNTER — APPOINTMENT (OUTPATIENT)
Dept: GENERAL RADIOLOGY | Facility: HOSPITAL | Age: 81
DRG: 516 | End: 2021-08-26
Attending: INTERNAL MEDICINE
Payer: MEDICARE

## 2021-08-26 ENCOUNTER — APPOINTMENT (OUTPATIENT)
Dept: GENERAL RADIOLOGY | Facility: HOSPITAL | Age: 81
DRG: 516 | End: 2021-08-26
Attending: NURSE PRACTITIONER
Payer: MEDICARE

## 2021-08-26 PROCEDURE — 72100 X-RAY EXAM L-S SPINE 2/3 VWS: CPT | Performed by: NURSE PRACTITIONER

## 2021-08-26 PROCEDURE — 99233 SBSQ HOSP IP/OBS HIGH 50: CPT | Performed by: HOSPITALIST

## 2021-08-26 PROCEDURE — 74019 RADEX ABDOMEN 2 VIEWS: CPT | Performed by: INTERNAL MEDICINE

## 2021-08-26 NOTE — PLAN OF CARE
Pt A&Ox4. On 3L O2 via nasal cannula. VSS. Dr Dayday Keller at bedside this AM and removed J tubing from [address] tube. G tube feedings re-started at 1430 at 10mL/hr per new order. Voiding via purewick. Brief in place. Kyphoplasty ordered.  CT lumbar spine completed y

## 2021-08-26 NOTE — PROGRESS NOTES
[facility]  Progress Note    Pipe Ingram Patient Status:  Inpatient    10/9/1940 MRN [id number]   [facility] 3SW-A Attending Alicia Deng MD   Date 2021 PCP Collette Banister Danya Maria     Subjective:  Pipe Ingram is a(n) 79 year Choledocholithiasis     Weight loss, unintentional     Dehydration     Diarrhea     Palpitations     Paroxysmal atrial fibrillation (HCC)     C. difficile colitis     Attention to G-tube [facility])     Malfunction of gastrostomy tube ([facility] [address]     Paralytic ileus (

## 2021-08-26 NOTE — PROGRESS NOTES
[facility]  Progress Note    Raysa Valle Patient Status:  Inpatient    10/9/1940 MRN [id number]   [facility] 3SW-A Attending Iain Taylor MD   Date 2021 PCP Arnav Mejia     Subjective:  Raysa Valle is a(n) 79 year Dehydration     Diarrhea     Palpitations     Paroxysmal atrial fibrillation (HCC)     C. difficile colitis     Attention to G-tube [facility])     Malfunction of gastrostomy tube (HCC)     Paralytic ileus (HCC)     Compression fracture of L2 lumbar vertebra (HC

## 2021-08-26 NOTE — OCCUPATIONAL THERAPY NOTE
OCCUPATIONAL THERAPY ORTHO EVALUATION - INPATIENT     Room Number: 368/368-A  Evaluation Date: 8/26/2021  Type of Evaluation: Initial  Presenting Problem: fall    Physician Order: IP Consult to Occupational Therapy  Reason for Therapy: ADL/IADL Dysfunction Recommendations: Sub-acute rehabilitation       Recommendations for nursing staff:    Transfers: one assist with RW, gait belt, and TLSO   Toileting location: bedside commode    OCCUPATIONAL PROFILE    HOME SITUATION  Type of Home: Condo  Home Layout: One Role of OT, safety with ADL and transfers, precautions, brace management. Patient educated on donning and doffing brace, able to demonstrate with total (A).  Patient also educated on caring for brace and importance of maintaining skin integrity with goo

## 2021-08-26 NOTE — PLAN OF CARE
Assumed care for this pt at 2100. Pt alert oriented soft spoken, pt vss refusing all medication to be placed to gtube. Denies nausea or vomiting does have increased secretions in mouth that she is spitting into a tissue.  Hypoactive bowel sounds, passing ga

## 2021-08-26 NOTE — CM/SW NOTE
Reviewed chart and noted pt interested in [facility]. [facility] reviewed pt's chart and does not feel pt has a qualifying diagnosis for acute rehab at this time. Pt can be re-evaluated if she has kyphoplasty.     Met with pt and pt's  at

## 2021-08-26 NOTE — PHYSICAL THERAPY NOTE
PHYSICAL THERAPY TREATMENT NOTE - INPATIENT    Room Number: 368/368-A     Session: 1   Number of Visits to Meet Established Goals: 5    Presenting Problem: dx L2 compression fracture s/p fall at home; possible SBO     Problem List  Principal Problem:    C CEREBRAL CAROTID UNILATERAL  10/26/2020        • IR INTRA ARTERIAL STROKE INTERVENTION  10/26/2020        • LUMPECTOMY RIGHT     • OTHER      surgery to remove cyst in throat 1980's   • UPPER GI ENDOSCOPY,EXAM         SUBJECTIVE  \"I am usually a better ro willing to participate in session, working towards increased functional mobility and independence. Discussed GOC, and short term/long term goals for optimal functional independence and safety.     Transfers    Rolling R: CGA  Rolling L:  Supine<>Sit: Min training;Strengthening;Transfer training;Balance training  Rehab Potential : Fair  Frequency (Obs): 3-5x/week    CURRENT GOALS       Goal #1 Patient is able to demonstrate supine - sit EOB @ level: minimum assistance - met 8/26/2021     Upgrade to LINH dykes

## 2021-08-27 ENCOUNTER — APPOINTMENT (OUTPATIENT)
Dept: NUCLEAR MEDICINE | Facility: HOSPITAL | Age: 81
DRG: 516 | End: 2021-08-27
Attending: HOSPITALIST
Payer: MEDICARE

## 2021-08-27 LAB — SARS-COV-2 RNA RESP QL NAA+PROBE: NOT DETECTED

## 2021-08-27 PROCEDURE — 99231 SBSQ HOSP IP/OBS SF/LOW 25: CPT | Performed by: NEUROLOGICAL SURGERY

## 2021-08-27 PROCEDURE — 78306 BONE IMAGING WHOLE BODY: CPT | Performed by: HOSPITALIST

## 2021-08-27 PROCEDURE — 78832 RP LOCLZJ TUM SPECT W/CT 2: CPT | Performed by: HOSPITALIST

## 2021-08-27 PROCEDURE — 99232 SBSQ HOSP IP/OBS MODERATE 35: CPT | Performed by: HOSPITALIST

## 2021-08-27 NOTE — DIETARY NOTE
[facility]  NUTRITION ASSESSMENT    Pt meets moderate malnutrition criteria.     CRITERIA FOR MALNUTRITION DIAGNOSIS:  Criteria for non-severe malnutrition diagnosis: chronic illness related to wt loss 10% in 6 months and energy intake less than75% for provided pump as she able to \"infuse it better\" be herself and her GI doctor is aware. Pt informed that current feeds will be administered via pump. Noted 21#/17% wt loss over the last 6 months, which is significant per standards.  Fairly stable this tori intake less than estimated needs     Underweight for advanced age related to inadequate oral intake as evidenced by BMI of 16.95 kg/m2 as compared to normal BMI range of 23-27 kg/m2 for adults >65 years.     MONITOR AND EVALUATE/NUTRITION GOALS:  1. Gradual

## 2021-08-27 NOTE — PROGRESS NOTES
[facility]  Neurosurgery Progress Note    Dayana Narcisarui Patient Status:  Inpatient    10/9/1940 MRN [id number]   [facility] 3SW-A Attending Oswaldo Cunha MD   Hosp Day # 5 PCP Christy Dillon     Chief Complaint:  Patient presents wi with np  Case discussed  Feels good in brace  X rays looked good  If gets kypho, no octavio f/u needed  If no kypho can f/u in 4 weeks with x rays  Brace instructions given

## 2021-08-27 NOTE — CM/SW NOTE
Met with pt's  to discuss DC planning. Pt off floor for bone scan. Provided updated [facility] list and discuss facilities that have indicated they are in-network with pt's insurance plan that is effective 9/1.   Also informed pt that [facility] has i

## 2021-08-27 NOTE — DIETARY NOTE
[facility]  NUTRITION ASSESSMENT    Pt meets moderate malnutrition criteria.     CRITERIA FOR MALNUTRITION DIAGNOSIS:  Criteria for non-severe malnutrition diagnosis: chronic illness related to wt loss 10% in 6 months and energy intake less than75% for months, which is significant per standards. Fairly stable this month. No family at bedside. UBW ~120#. Will monitor for POC; TF recommendations above. ANTHROPOMETRICS:  Ht: 166.4 cm (5' 4.5\")  Wt: 45.5 kg (100 lb 5 oz).  This is 82% of IBW  BMI: Body ma adults >65 years. MONITOR AND EVALUATE/NUTRITION GOALS:  1. Gradual weight gain of at least .5 lbs per week - Not met, Continues  2. Start alternative nutrition in 24-48 hrs if diet is not able to advance- Resolved  3.  Tolerate alternative nutrition at

## 2021-08-27 NOTE — PROGRESS NOTES
[facility]  Progress Note     Pipe Ingram Patient Status:  Inpatient    10/9/1940 MRN [id number]   [facility] 3SW-A Attending Alicia Deng MD   Hosp Day # 5 PCP Minus Olympia     Chief Complaint: back pain    S: Patient has m Not Detected 08/17/2021       Pro-Calcitonin  No results for input(s): PCT in the last 168 hours. Cardiac  No results for input(s): TROP, PBNP in the last 168 hours. Creatinine Kinase  No results for input(s): CK in the last 168 hours.     Inflammator

## 2021-08-27 NOTE — PLAN OF CARE
Patient A&O X4 on 3L O2. VSS, /IS. SCDs, eliquis on hold. Voiding freely via [address], [facility] 8/26. On trickle tube feeds at 10mL/hr.  Orders to increase tube feeds by 10mL/hr Q8hr as patient tolerates- patient refusing for RN to increase feeds and keeps re

## 2021-08-27 NOTE — PROGRESS NOTES
[facility]  Progress Note    Ez Daviesr Patient Status:  Inpatient    10/9/1940 MRN [id number]   [facility] 3SW-A Attending Sahara Davila MD   Date 2021 PCP Janelle George     Subjective:  Ez Daviesr is a(n) 79 year Cerebrovascular disease     Choledocholithiasis     Weight loss, unintentional     Dehydration     Diarrhea     Palpitations     Paroxysmal atrial fibrillation (HCC)     C. difficile colitis     Attention to G-tube [facility])     Malfunction of gastrostomy tube

## 2021-08-27 NOTE — PLAN OF CARE
A&Ox 4, states pain to Lt side of back, bone scan ordered, Morphine given prior to test, down at 1245, G-Tube feeding on hold, Eliquis on hold, weaned to room air w/ O2 sats =94%, IS encouraged, caregiver at bedside, updated on plan of car    1500- Tube fe

## 2021-08-28 PROCEDURE — 99232 SBSQ HOSP IP/OBS MODERATE 35: CPT | Performed by: HOSPITALIST

## 2021-08-28 NOTE — OCCUPATIONAL THERAPY NOTE
OCCUPATIONAL THERAPY TREATMENT NOTE - INPATIENT     Room Number: 368/368-A  Session: 1   Number of Visits to Meet Established Goals: 5    Presenting Problem: fall    History related to current admission: Pt admitted 8/22/2021 for Inability to walk [R26.2] Has Gastrostomy tube   • Sputum production    • Stroke [facility])     11/2020/   pt is receving PT and OT therapy- using walker but wheelchair if long distances   • Wears glasses    • Weight loss        Past Surgical History  Past Surgical History:   Procedure present. Pt politely but firmly declines OOB activity secondary to pain. Transitioned to chair position in bed and engaged in BUE AROM HEP.  Pt able to complete 2 sets, 15 reps fist pumps, forearm supination, elbow flexion, forward punch, shoulder flexion

## 2021-08-28 NOTE — PLAN OF CARE
Alert and oriented,V/S stable,G-Tube feeding infusing,at 20 ml/hr,denies nausea and vomiting. Voiding per purewick. Taking pain medication as needed. Had a good bowel movement today. Will continue to monitor,O2 in use. Scattered cough on and on. Safety measures

## 2021-08-28 NOTE — PROGRESS NOTES
[facility]  Progress Note     Jesenia Car Patient Status:  Inpatient    10/9/1940 MRN [id number]   [facility] 3SW-A Attending Radha Nolen MD   Hosp Day # 6 PCP Forestine Brake     Chief Complaint: back pain falls    S: Patient 08/17/2021       Pro-Calcitonin  No results for input(s): PCT in the last 168 hours. Cardiac  No results for input(s): TROP, PBNP in the last 168 hours. Creatinine Kinase  No results for input(s): CK in the last 168 hours.     Inflammatory Markers  No

## 2021-08-28 NOTE — PLAN OF CARE
Complaint of lower back pain related to L2 compression fracture. Patient asked for IV Morphine this morning and Norco per G-tube at noon. On G-tube feeding with Osmolite 1.2, received at 20 ml an hour then increased to 30 ml at 0800.   Denies having nause

## 2021-08-29 PROCEDURE — 99232 SBSQ HOSP IP/OBS MODERATE 35: CPT | Performed by: HOSPITALIST

## 2021-08-29 NOTE — PLAN OF CARE
TF per G-tube increased at 40 ml/hr at 8pm,tolerating so far. Denies nausea and vomiting. Norco and Morphine for back pain given as requested. V/S stable,voiding per purewick,adequate amount. Reinforced IS use,reposition for comfort. Safety measures reinforced,

## 2021-08-29 NOTE — PLAN OF CARE
Assumed care of patient at 0700. A/o x3   Denies Chest pain, sob, Lightheadedness, dizziness. Osmolite feeding. Flush and feed per orders. Feeding slowly being advanced. Currently @ 40ml/hr and tolerating.    Morphine given as ordered per pt request. with activity based on assessment  - Modify environment to reduce risk of injury  - Provide assistive devices as appropriate  - Consider OT/PT consult to assist with strengthening/mobility  - Encourage toileting schedule  Outcome: Progressing     Problem:

## 2021-08-29 NOTE — CM/SW NOTE
Sw spoke to Francisca Villalta at [facility]- they will need to get auth from pt's [facility] will not be in effect until 9/1.     Abbey Said, LCSW  /Discharge Planner  [phone]

## 2021-08-29 NOTE — PROGRESS NOTES
[facility]  Progress Note     Marycarmen Ilir Patient Status:  Inpatient    10/9/1940 MRN [id number]   [facility] 3SW-A Attending Ying Farley MD   Hosp Day # 7 PCP Dominic Osgood     Chief Complaint: s/p falls and L2 fx    S: Pat last 168 hours. Cardiac  No results for input(s): TROP, PBNP in the last 168 hours. Creatinine Kinase  No results for input(s): CK in the last 168 hours. Inflammatory Markers  No results for input(s): CRP, ARIAS, LDH, DDIMER in the last 168 hours.

## 2021-08-30 PROCEDURE — 99232 SBSQ HOSP IP/OBS MODERATE 35: CPT | Performed by: HOSPITALIST

## 2021-08-30 NOTE — PLAN OF CARE
Aox4, VVS, kypho not done today per IR possibly done tomorrow, osomolite 1.2 through G tube @ 60mL/hr tolerating well no N/V, previous J tube site draining and 4x4 w/paper tape applied, wound care consulted for draining site, safety maintained, bed alarm o

## 2021-08-30 NOTE — PROGRESS NOTES
[facility]  Progress Note     Valentine Kunz Patient Status:  Inpatient    10/9/1940 MRN [id number]   [facility] 3SW-A Attending Cecilio Vizcaino MD   Hosp Day # 8 PCP Eloy Camarena     Chief Complaint: s/p falls, L2 acute compressi hours. Cardiac  No results for input(s): TROP, PBNP in the last 168 hours. Creatinine Kinase  No results for input(s): CK in the last 168 hours. Inflammatory Markers  No results for input(s): CRP, ARIAS, LDH, DDIMER in the last 168 hours.     Imaging

## 2021-08-30 NOTE — PHYSICAL THERAPY NOTE
IP PT attempted, per RN, hold therapy. Pt may have kyphoplasty today. Will re-attempt as schedule permits. Will require new orders /activity orders to resume therapy after procedure.

## 2021-08-30 NOTE — PLAN OF CARE
Patient alert and oriented. VSS on RA. , IS. Osmolite 1.2 tube feeding running. Tolerating well, no nausea. Residual 20 ML. Increased to 60 ML/HR at 2000. Flushed. Loose BM tonight. Taking medications per Gtube.  Tramadol given for back pain with good r

## 2021-08-30 NOTE — DIETARY MALNUTRITION NOTE
[facility]  NUTRITION ASSESSMENT    Pt meets moderate malnutrition criteria.     CRITERIA FOR MALNUTRITION DIAGNOSIS:  Criteria for non-severe malnutrition diagnosis: chronic illness related to wt loss 10% in 6 months and energy intake less than75% for recently to 3 cartons daily. During this admission, pt admits to only given 1/2 carton at times with 20-30 ml flush, pt also states she stopped using provided pump as she able to \"infuse it better\" be herself and her GI doctor is aware.  Pt informed that muscle mass   2.  Fluid Accumulation: none per RN documentation     NUTRITION PRESCRIPTION: 55.6 kg IBW  Calories: 1668-1946calories/day (30-35 calories per kg)  Protein: 66-83 grams protein/day (1.2-1.5 grams protein per kg)  Fluid: ~1 ml/kcal or per MD [facility]

## 2021-08-31 LAB — SARS-COV-2 RNA RESP QL NAA+PROBE: NOT DETECTED

## 2021-08-31 PROCEDURE — 99232 SBSQ HOSP IP/OBS MODERATE 35: CPT | Performed by: HOSPITALIST

## 2021-08-31 NOTE — PROGRESS NOTES
Pt refusing tube feeds. Dr Georgina Franklin paged. [address] [place] with Dr Marilou Landry. Received orders to keep tube feeds on hold and resume in 3 hours. 1600 - Pt restarted on tube feeds. Pt tolerating well.

## 2021-08-31 NOTE — OCCUPATIONAL THERAPY NOTE
OT attempted to see pt this PM, pt with Kypho pending for tomorrow at 10am, OT will follow up after kypho.

## 2021-08-31 NOTE — PHYSICAL THERAPY NOTE
Pt scheduled to have kyphoplasty 9/1/21 10AM.  Will see pt when appropriate following procedure. Current recommendation per previous PT notes is AMADOU.

## 2021-08-31 NOTE — CDS QUERY
Potential for Impaired Nutritional Status  Jason Sánchez  Dear Dr. Yumiko Pina information documented by the Clinical Dietician suggests potential for impaired nutritional status.  For accurate ICD-10-CM code assignment to reflect se

## 2021-08-31 NOTE — CM/SW NOTE
Spoke with Dr Sumit Rangel who stated plan for kyphoplasty 9/1 at [facility] 98 for AMADOU at [facility]. Pt with [facility] Medicare HMO plan effective 9/1. Insurance Jodell  will be needed and cannot be started until 9/1.   Updates sent to [facility] via [facility]

## 2021-08-31 NOTE — PLAN OF CARE
A&O x4. VSS. Room air, . Generalized pain moderately  Controlled with Tramadol PRN. Osomolite 1.2 per G tube @ 60mL/hr, tolerating fairly well. Pt reports N/V/D this evening, antiemetics given PRN. LSO brace when OOB. Voids per mary.  Eliquis on hold

## 2021-09-01 ENCOUNTER — APPOINTMENT (OUTPATIENT)
Dept: INTERVENTIONAL RADIOLOGY/VASCULAR | Facility: HOSPITAL | Age: 81
DRG: 516 | End: 2021-09-01
Attending: NURSE PRACTITIONER
Payer: MEDICARE

## 2021-09-01 PROCEDURE — 99232 SBSQ HOSP IP/OBS MODERATE 35: CPT | Performed by: STUDENT IN AN ORGANIZED HEALTH CARE EDUCATION/TRAINING PROGRAM

## 2021-09-01 PROCEDURE — 0QS03ZZ REPOSITION LUMBAR VERTEBRA, PERCUTANEOUS APPROACH: ICD-10-PCS | Performed by: RADIOLOGY

## 2021-09-01 PROCEDURE — 0QU03JZ SUPPLEMENT LUMBAR VERTEBRA WITH SYNTHETIC SUBSTITUTE, PERCUTANEOUS APPROACH: ICD-10-PCS | Performed by: RADIOLOGY

## 2021-09-01 RX ORDER — CLINDAMYCIN PHOSPHATE 150 MG/ML
INJECTION, SOLUTION INTRAVENOUS
Status: COMPLETED
Start: 2021-09-01 | End: 2021-09-01

## 2021-09-01 RX ORDER — MIDAZOLAM HYDROCHLORIDE 1 MG/ML
INJECTION INTRAMUSCULAR; INTRAVENOUS
Status: COMPLETED
Start: 2021-09-01 | End: 2021-09-01

## 2021-09-01 RX ORDER — LIDOCAINE HYDROCHLORIDE 10 MG/ML
INJECTION, SOLUTION INFILTRATION; PERINEURAL
Status: COMPLETED
Start: 2021-09-01 | End: 2021-09-01

## 2021-09-01 RX ORDER — ONDANSETRON 2 MG/ML
INJECTION INTRAMUSCULAR; INTRAVENOUS
Status: COMPLETED
Start: 2021-09-01 | End: 2021-09-01

## 2021-09-01 NOTE — PROGRESS NOTES
[facility]  Progress Note     Teetee Stout Patient Status:  Inpatient    10/9/1940 MRN [id number]   [facility] 3SW-A Attending Josephine Hawk MD   Hosp Day # 10 PCP Irma Navarro     Chief Complaint: Back pain     S: Patient results for input(s): TROP, PBNP in the last 168 hours. Creatinine Kinase  No results for input(s): CK in the last 168 hours. Inflammatory Markers  No results for input(s): CRP, ARIAS, LDH, DDIMER in the last 168 hours.     Imaging: Imaging data reviewe

## 2021-09-01 NOTE — PROGRESS NOTES
[facility]  Progress Note     Raysa Valle Patient Status:  Inpatient    10/9/1940 MRN [id number]   [facility] 3SW-A Attending Iain Taylor MD   Hosp Day # 9 PCP Galen Ohara     Chief Complaint: back pain and s/p falls    S: last 168 hours. Cardiac  No results for input(s): TROP, PBNP in the last 168 hours. Creatinine Kinase  No results for input(s): CK in the last 168 hours. Inflammatory Markers  No results for input(s): CRP, ARIAS, LDH, DDIMER in the last 168 hours.

## 2021-09-01 NOTE — PHYSICAL THERAPY NOTE
PHYSICAL THERAPY TREATMENT NOTE - INPATIENT    Room Number: 368/368-A     Session: 2  (orders to resume PT s/p kyphoplasty 9/1/21)  Number of Visits to Meet Established Goals: 5    Presenting Problem: L2 compression fracture s/p fall, s/p kyphoplasty 9/1/ disease    • Arrhythmia     a-fib   • Bad breath    • Cancer [facility])     breast cancer- right 2010   • Cataract    • COPD (chronic obstructive pulmonary disease) (HCC)     no O2   • Diarrhea, unspecified    • Difficult intubation     cannot identify why this Static Sitting: Poor  Dynamic Sitting: Dependent           Static Standing: Poor  Dynamic Standing: Poor -    ACTIVITY TOLERANCE           BP: 114/62  BP Location: Right arm  BP Method: Automatic  Patient Pos in brace. Sit to stand with mod assist of 2 with cueing. Shuffle steps to chair with mod/max assist of 2. Seated with mod assist of 2. Positioned for comfort in chair.  Instructed in LE ex for ankle pumps, seated knee extension and seated marching, pt with - met 8/26/2021      Upgrade to 50ft CGA with use of RW   9/1/21: downgrade to 10 ft with r/w and min assist   Goal #4     Goal #5     Goal #6     Goal Comments: Goals established on 8/24/2021 - all goals ongoing as of 8/26/2021, goals downgraded 9/1/21 du

## 2021-09-01 NOTE — CONSULTS
[facility]  Inpatient Wound Care Contact Note    Heriberto Brown Patient Status:  Inpatient    10/9/1940 MRN [id number]   Location [address] [facility] Attending Michaela Sanchez MD   Hosp Day # 10 PCP NAHUM Small     Attempted t

## 2021-09-01 NOTE — PLAN OF CARE
Pt A&Ox4. On 2L O2 via nasal cannula PRN. VSS. SCDs to BLE. Pt on tube feeds. Pt refusing tube feeds this afternoon for mild cramping, MD aware. Feedings restarted per order at 1600. Voiding via purewick. Brief in place. Plan for kyphoplasty tomorrow.  Pt t

## 2021-09-01 NOTE — CM/SW NOTE
Updates sent via [employer] to [facility]. Will need to send additional PT/OT notes as the notes that have been sent are before procedure. SW will follow.     Zenia Tabares LCSW

## 2021-09-01 NOTE — OCCUPATIONAL THERAPY NOTE
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 Encouragement needed for OOB activity secondary to pain. Max (A) log roll supine to sit. Min (A) static sitting balance with posterior lean, unable to correct with cueing.   Total (A) to don LSO and L foot post-op shoe, with second person for a Transfer Goals  Patient will perform bed mobility supine to sit with mod assist  Patient will perform bed mobility sit to supine with mod assist  Patient will perform bedside commode transfer with mod assist     Additional Goals:  Pt will state precautions

## 2021-09-01 NOTE — PLAN OF CARE
S/p Kyphoplasty, admitted by bed, vital signs & dsg checked as ordered, spoke w/ Dr. Fox james to resume Eliquis in am, Dr. Tala Shah updated , Osmolite to resume- feeding ordered from  dietary, PT/OT resumed, LSO brace when up, Tramadol given for pain pr

## 2021-09-01 NOTE — PLAN OF CARE
A&O x4. VSS. 2L O2 per nc PRN. Pain moderately controlled with Tramadol and IV morphine PRN. NPO at midnight. Pt reports N/V/D overnight, antiemetics given PRN. LSO brace and post op shoe when OOB.  WBAT 30-40 steps per day. Voids per purewick without diffi

## 2021-09-01 NOTE — PROCEDURES
[facility]  Procedure Note    Terrence Daniels Patient Status:  Inpatient    10/9/1940 MRN [id number]   Location [facility] [address] [facility] Attending Des Reid MD   Hosp Day # 10 PCP Peter Zamora     Procedure: L2 Kyphoplasty    P

## 2021-09-02 LAB
GLUCOSE BLD-MCNC: 118 MG/DL (ref 70–99)
GLUCOSE BLD-MCNC: 139 MG/DL (ref 70–99)

## 2021-09-02 PROCEDURE — 99232 SBSQ HOSP IP/OBS MODERATE 35: CPT | Performed by: STUDENT IN AN ORGANIZED HEALTH CARE EDUCATION/TRAINING PROGRAM

## 2021-09-02 RX ORDER — BACLOFEN 5 MG/1
5 TABLET ORAL ONCE
Status: COMPLETED | OUTPATIENT
Start: 2021-09-02 | End: 2021-09-02

## 2021-09-02 NOTE — CONSULTS
[facility]  Report of Inpatient Wound Care Consultation    Sowmya Cat [facility] Patient Status:  Inpatient    10/9/1940 MRN [id number]   [facility] 3SW-A Attending Kathy Kline MD   Hosp Day # 11 PCP Rachell Franklin     Reason for [facility] she quit smoking about 26 years ago. She smoked 1.00 pack per day. She has never used smokeless tobacco. She reports previous alcohol use. She reports that she does not use drugs.       Allergies:  @ALLERGY    Laboratory Data:    Recent Labs   Lab 09/02/21

## 2021-09-02 NOTE — PLAN OF CARE
Patient rating pain 8 this morning but now rating pain higher. O2 desating to 88 percent on RA, patient states that it is because of the tube feedings (running full strength at 65 ml per hour) not pain medication making her drowsy.  Old G/J tube site did no

## 2021-09-02 NOTE — CM/SW NOTE
Updates sent to [facility] [place] via [address]. Await insurance auth for AMADOU and medical clearance for discharge. / to remain available for support and/or discharge planning.      Miguel Magdaleno, [facility]  Discharge Planner  [phone]

## 2021-09-02 NOTE — PROGRESS NOTES
[facility]  Progress Note     Nonnie Longest Patient Status:  Inpatient    10/9/1940 MRN [id number]   [facility] 3SW-A Attending Kathy Kline MD   Hosp Day # 6 PCP Rachell Franklin     Chief Complaint: Back pain     S: Patient r hours. Cardiac  No results for input(s): TROP, PBNP in the last 168 hours. Creatinine Kinase  No results for input(s): CK in the last 168 hours. Inflammatory Markers  No results for input(s): CRP, ARIAS, LDH, DDIMER in the last 168 hours.     Imaging

## 2021-09-02 NOTE — PLAN OF CARE
A&O x4. VSS. 2L O2 per nc PRN. Pain moderately controlled with Tramadol and IV morphine PRN. Osmolite 1.2 running @ 65mL/hr, pt tolerating well. Old G tube site dressing saturated w/yellow drainage, new dressing applied.  LSO brace and post op shoe when OOB

## 2021-09-02 NOTE — CM/SW NOTE
Received update from [facility] that they have received insurance auth and can accept pt for admission. Met with pt and  at bedside to discuss DC planning. All questions/concerns addressed. Updated pt's RN. Await medical clearance for DC.   Wendy Palumbo

## 2021-09-03 VITALS
BODY MASS INDEX: 17 KG/M2 | WEIGHT: 100.31 LBS | HEART RATE: 62 BPM | RESPIRATION RATE: 18 BRPM | TEMPERATURE: 98 F | SYSTOLIC BLOOD PRESSURE: 119 MMHG | OXYGEN SATURATION: 93 % | DIASTOLIC BLOOD PRESSURE: 49 MMHG

## 2021-09-03 LAB — GLUCOSE BLD-MCNC: 132 MG/DL (ref 70–99)

## 2021-09-03 PROCEDURE — 99238 HOSP IP/OBS DSCHRG MGMT 30/<: CPT | Performed by: STUDENT IN AN ORGANIZED HEALTH CARE EDUCATION/TRAINING PROGRAM

## 2021-09-03 RX ORDER — NALOXONE HYDROCHLORIDE 4 MG/.1ML
4 SPRAY, METERED NASAL AS NEEDED
Qty: 1 KIT | Refills: 0 | Status: SHIPPED | OUTPATIENT
Start: 2021-09-03

## 2021-09-03 RX ORDER — MUSCLE RUB CREAM 100; 150 MG/G; MG/G
CREAM TOPICAL 3 TIMES DAILY PRN
Status: DISCONTINUED | OUTPATIENT
Start: 2021-09-03 | End: 2021-09-03

## 2021-09-03 RX ORDER — TRAMADOL HYDROCHLORIDE 50 MG/1
50 TABLET ORAL EVERY 6 HOURS PRN
Qty: 12 TABLET | Refills: 0 | Status: SHIPPED | OUTPATIENT
Start: 2021-09-03

## 2021-09-03 RX ORDER — TRAMADOL HYDROCHLORIDE 50 MG/1
100 TABLET ORAL EVERY 6 HOURS PRN
Status: DISCONTINUED | OUTPATIENT
Start: 2021-09-03 | End: 2021-09-03

## 2021-09-03 RX ORDER — ACETAMINOPHEN AND CODEINE PHOSPHATE 120; 12 MG/5ML; MG/5ML
SOLUTION ORAL EVERY 8 HOURS PRN
Qty: 90 ML | Refills: 0 | Status: SHIPPED | OUTPATIENT
Start: 2021-09-03

## 2021-09-03 NOTE — PLAN OF CARE
Report called to [facility]. Patient discharged via ambulance in stable condition. Left G/J tube site dressing changed, moderate amount of yellow drainage noted, new foam dressing applied. Tube feeding stopped prior to discharge and flushed with H2O.

## 2021-09-03 NOTE — CM/SW NOTE
Spoke with pt's RN who stated pt can discharge to [facility] today. Confirmed with [facility] that pt can be accepted for admission today. Ambulance transport scheduled for 1pm.  PCS form completed and available for RN to print.   Updated pt/spouse at bedside

## 2021-09-03 NOTE — DISCHARGE SUMMARY
[facility]  DISCHARGE SUMMARY     Luz Marina Figueroa Patient Status:  Inpatient    10/9/1940 MRN [id number]   [facility] 3SW-A Attending Jesusita Segovia MD   Hosp Day # 12 PCP Gucci Snyder     Date of Admission: 2021  Date of D needed. If patient remains unresponsive, repeat dose in other nostril 2-5 minutes after first dose. Quantity: 1 kit  Refills: 0     traMADol 50 MG Tabs  Commonly known as: ULTRAM      Take 1 tablet (50 mg total) by mouth every 6 (six) hours as needed. by Per G Tube route nightly. Refills: 0     nystatin [id number] UNIT/GM Crea  Commonly known as: MYCOSTATIN      Apply 1 Application topically 2 (two) times daily.  To affected area   Refills: 0     ondansetron 4 mg tablet  Commonly known as: ZOFRAN      4 mg 60  Resp:  [16-18] 17  BP: (100-134)/(47-73) 111/61    Physical Exam:    General: No acute distress. Respiratory: Clear to auscultation bilaterally. No wheezes. No rhonchi. Cardiovascular: S1, S2. Regular rate and rhythm. No murmurs, rubs or gallops.

## 2021-09-03 NOTE — PLAN OF CARE
V/S stable,TF of OSMOLITE 1.2 strength running at 65 ml/mr,with 90 ml flushes q 4 hours infusing,tolerating well. Denies nausea or vomiting. Abdomen soft,non-tender. G-tube with dressing dry/intact. Tearful at the beginning of the shift. states her  was

## 2021-09-15 ENCOUNTER — APPOINTMENT (OUTPATIENT)
Dept: CT IMAGING | Facility: HOSPITAL | Age: 81
End: 2021-09-15
Attending: EMERGENCY MEDICINE
Payer: MEDICARE

## 2021-09-15 ENCOUNTER — HOSPITAL ENCOUNTER (EMERGENCY)
Facility: HOSPITAL | Age: 81
Discharge: HOME | End: 2021-09-15
Attending: EMERGENCY MEDICINE
Payer: MEDICARE

## 2021-09-15 VITALS
WEIGHT: 96 LBS | RESPIRATION RATE: 16 BRPM | HEIGHT: 64 IN | BODY MASS INDEX: 16.39 KG/M2 | OXYGEN SATURATION: 99 % | TEMPERATURE: 98 F | DIASTOLIC BLOOD PRESSURE: 61 MMHG | SYSTOLIC BLOOD PRESSURE: 122 MMHG | HEART RATE: 72 BPM

## 2021-09-15 DIAGNOSIS — R19.7 DIARRHEA, UNSPECIFIED TYPE: Primary | ICD-10-CM

## 2021-09-15 DIAGNOSIS — R11.2 NAUSEA AND VOMITING, INTRACTABILITY OF VOMITING NOT SPECIFIED, UNSPECIFIED VOMITING TYPE: ICD-10-CM

## 2021-09-15 DIAGNOSIS — E86.0 DEHYDRATION: ICD-10-CM

## 2021-09-15 LAB
ALBUMIN SERPL-MCNC: 3.5 G/DL (ref 3.4–5)
ALBUMIN/GLOB SERPL: 0.8 {RATIO} (ref 1–2)
ALP LIVER SERPL-CCNC: 140 U/L
ALT SERPL-CCNC: 97 U/L
ANION GAP SERPL CALC-SCNC: 5 MMOL/L (ref 0–18)
AST SERPL-CCNC: 65 U/L (ref 15–37)
BASOPHILS # BLD AUTO: 0.03 X10(3) UL (ref 0–0.2)
BASOPHILS NFR BLD AUTO: 0.3 %
BILIRUB SERPL-MCNC: 0.6 MG/DL (ref 0.1–2)
BUN BLD-MCNC: 40 MG/DL (ref 7–18)
CALCIUM BLD-MCNC: 10.1 MG/DL (ref 8.5–10.1)
CHLORIDE SERPL-SCNC: 106 MMOL/L (ref 98–112)
CO2 SERPL-SCNC: 28 MMOL/L (ref 21–32)
CREAT BLD-MCNC: 1.2 MG/DL
EOSINOPHIL # BLD AUTO: 0.1 X10(3) UL (ref 0–0.7)
EOSINOPHIL NFR BLD AUTO: 1 %
ERYTHROCYTE [DISTWIDTH] IN BLOOD BY AUTOMATED COUNT: 13.2 %
GLOBULIN PLAS-MCNC: 4.3 G/DL (ref 2.8–4.4)
GLUCOSE BLD-MCNC: 93 MG/DL (ref 70–99)
HCT VFR BLD AUTO: 51.5 %
HGB BLD-MCNC: 16.9 G/DL
IMM GRANULOCYTES # BLD AUTO: 0.04 X10(3) UL (ref 0–1)
IMM GRANULOCYTES NFR BLD: 0.4 %
LIPASE SERPL-CCNC: 94 U/L (ref 73–393)
LYMPHOCYTES # BLD AUTO: 1.25 X10(3) UL (ref 1–4)
LYMPHOCYTES NFR BLD AUTO: 12.6 %
MCH RBC QN AUTO: 31.7 PG (ref 26–34)
MCHC RBC AUTO-ENTMCNC: 32.8 G/DL (ref 31–37)
MCV RBC AUTO: 96.6 FL
MONOCYTES # BLD AUTO: 0.82 X10(3) UL (ref 0.1–1)
MONOCYTES NFR BLD AUTO: 8.3 %
NEUTROPHILS # BLD AUTO: 7.65 X10 (3) UL (ref 1.5–7.7)
NEUTROPHILS # BLD AUTO: 7.65 X10(3) UL (ref 1.5–7.7)
NEUTROPHILS NFR BLD AUTO: 77.4 %
OSMOLALITY SERPL CALC.SUM OF ELEC: 297 MOSM/KG (ref 275–295)
PLATELET # BLD AUTO: 422 10(3)UL (ref 150–450)
POTASSIUM SERPL-SCNC: 3.9 MMOL/L (ref 3.5–5.1)
PROT SERPL-MCNC: 7.8 G/DL (ref 6.4–8.2)
RBC # BLD AUTO: 5.33 X10(6)UL
SODIUM SERPL-SCNC: 139 MMOL/L (ref 136–145)
WBC # BLD AUTO: 9.9 X10(3) UL (ref 4–11)

## 2021-09-15 PROCEDURE — 85025 COMPLETE CBC W/AUTO DIFF WBC: CPT | Performed by: EMERGENCY MEDICINE

## 2021-09-15 PROCEDURE — 80053 COMPREHEN METABOLIC PANEL: CPT | Performed by: EMERGENCY MEDICINE

## 2021-09-15 PROCEDURE — 96360 HYDRATION IV INFUSION INIT: CPT

## 2021-09-15 PROCEDURE — 74176 CT ABD & PELVIS W/O CONTRAST: CPT | Performed by: EMERGENCY MEDICINE

## 2021-09-15 PROCEDURE — 93010 ELECTROCARDIOGRAM REPORT: CPT

## 2021-09-15 PROCEDURE — 99284 EMERGENCY DEPT VISIT MOD MDM: CPT

## 2021-09-15 PROCEDURE — 96361 HYDRATE IV INFUSION ADD-ON: CPT

## 2021-09-15 PROCEDURE — 99285 EMERGENCY DEPT VISIT HI MDM: CPT

## 2021-09-15 PROCEDURE — 83690 ASSAY OF LIPASE: CPT | Performed by: EMERGENCY MEDICINE

## 2021-09-15 PROCEDURE — 93005 ELECTROCARDIOGRAM TRACING: CPT

## 2021-09-15 RX ORDER — SODIUM CHLORIDE 9 MG/ML
125 INJECTION, SOLUTION INTRAVENOUS CONTINUOUS
Status: DISCONTINUED | OUTPATIENT
Start: 2021-09-15 | End: 2021-09-16

## 2021-09-15 RX ORDER — LOPERAMIDE HYDROCHLORIDE 2 MG/1
2 TABLET ORAL
COMMUNITY

## 2021-09-15 RX ORDER — LIDOCAINE 4 G/G
1 PATCH TOPICAL DAILY
COMMUNITY

## 2021-09-15 NOTE — ED PROVIDER NOTES
Patient Seen in: [facility] Emergency Department      History   Patient presents with:  Nausea/Vomiting/Diarrhea    Stated Complaint: diarrhea and vomiting. recent discharge from [address] [place] [address].     Subjective:   HPI    This is a 14-year-old female who arri glasses    • Weight loss               Past Surgical History:   Procedure Laterality Date   • CARDIAC PACEMAKER PLACEMENT  12/18/2013    [employer] model;[id number];serial#[id number] dual chamber   • EGD     • IR ANGIOGRAM CEREBRAL CAROTID UNILATERAL  10/26/2020 rebound. There is no guarding. EXT: There is good pulses bilaterally. There is no calf tenderness. There is no rash noted. There is no edema    NEURO: Alert and oriented x3.   Muscle strength and sensory exam is grossly normal.  And the patient is ne fluids.                 CT ABDOMEN+PELVIS(CPT=74176)    Result Date: 9/15/2021  PROCEDURE:  CT ABDOMEN+PELVIS (CPT=74176)  COMPARISON:  [facility] , CT, CT SPINE LUMBAR (CPT=72131), 8/25/2021, 6:12 PM.  [facility] , CT, CT ABDOMEN PELVIS IV CONTRAST, NO ORAL (ER), PELVIC ORGANS:  Cystic mass in the right adnexa shows increase in size 6.0 x 4.6 cm, previously 5.9 x 4.3 cm. LUNG BASES:  No acute process. BONES:  No acute abnormality. Vertebral augmentation L2. Stable mild loss of height upper endplate L4. encounter diagnosis)  Nausea and vomiting, intractability of vomiting not specified, unspecified vomiting type  Dehydration     Disposition:  Discharge  9/15/2021  9:41 pm    Follow-up:  Dejah Diaz  [address] [address]     In

## 2021-09-15 NOTE — ED INITIAL ASSESSMENT (HPI)
Diarrhea for several days per patient. Discharged from [address] [place] [address] yesterday. Vomiting today. C/O generalized body pain everywhere now.

## 2021-09-16 NOTE — ED QUICK NOTES
Pt received 240ml water. Pt remains resting comfortably with adult family at bs. No n/v/d since arrival to ed.

## 2021-09-17 LAB
ATRIAL RATE: 71 BPM
P-R INTERVAL: 154 MS
Q-T INTERVAL: 542 MS
QRS DURATION: 76 MS
QTC CALCULATION (BEZET): 588 MS
R AXIS: 47 DEGREES
T AXIS: 78 DEGREES
VENTRICULAR RATE: 71 BPM

## 2021-12-29 ENCOUNTER — LAB REQUISITION (OUTPATIENT)
Dept: LAB | Facility: HOSPITAL | Age: 81
End: 2021-12-29
Payer: MEDICARE

## 2021-12-29 DIAGNOSIS — K94.23 GASTROSTOMY MALFUNCTION (HCC): ICD-10-CM

## 2021-12-29 LAB
ANION GAP SERPL CALC-SCNC: 8 MMOL/L (ref 0–18)
BUN BLD-MCNC: 18 MG/DL (ref 7–18)
CALCIUM BLD-MCNC: 8.8 MG/DL (ref 8.5–10.1)
CHLORIDE SERPL-SCNC: 109 MMOL/L (ref 98–112)
CO2 SERPL-SCNC: 25 MMOL/L (ref 21–32)
CREAT BLD-MCNC: 0.79 MG/DL
GLUCOSE BLD-MCNC: 83 MG/DL (ref 70–99)
OSMOLALITY SERPL CALC.SUM OF ELEC: 295 MOSM/KG (ref 275–295)
POTASSIUM SERPL-SCNC: 2.9 MMOL/L (ref 3.5–5.1)
SODIUM SERPL-SCNC: 142 MMOL/L (ref 136–145)

## 2021-12-29 PROCEDURE — 80048 BASIC METABOLIC PNL TOTAL CA: CPT | Performed by: INTERNAL MEDICINE

## 2025-05-10 NOTE — PLAN OF CARE
Assumed care at 0700. Pt A/O x4, answers all questions correctly but can be forgetful at times. Pt having hallucinations. Neuro and Neuro surgery both aware. Afib on tele. Pt converted to Afib overnight. EKG completed this AM per protocol.  HR in the 120-13 CC:   Chief Complaint   Patient presents with   • UTI     Possible UTI, dark urine, confusion x2 weeks        New Patient     SUBJECTIVE  HPI:Mildred Floyd is a 89 year old female who presents today with      Symptoms   This is a new problem. The current episode started 1 to 4 weeks ago. The problem has been unchanged. Associated symptoms include frequency and urgency. She has tried nothing for the symptoms.       His past medical history is significant for:  Past Medical History:   Diagnosis Date   • CAD (coronary artery disease)    • Congestive heart failure (CHF)  (CMD)    • COVID-19 vaccine administered    • Diverticulitis    • Esophageal reflux    • Family history of malignant hyperthermia    • Family history of reaction to anesthesia     malignant hyperthermia   • Hemorrhoid    • HLD (hyperlipidemia)    • HTN (hypertension)    • Hypercalcemia 10/2016   • Hypothyroidism     hypo   • IFG (impaired fasting glucose)    • Myocardial infarction  (CMD) 2016   • Osteoarthritis    • Right leg swelling    • Spinal stenosis, lumbar    • TIA (transient ischemic attack)     2 years ago,dropping little left side of face at that time, resolved   • Vertigo        Allergies:   ALLERGIES:   Allergen Reactions   • Adhesive   (Environmental) RASH   • Celecoxib RASH, SWELLING and Other (See Comments)     Feet & ankles had a rash & swelled.  Had red blotches on thighs.   • Simvastatin MYALGIA          • Succinylcholine Other (See Comments)     Family history of malignant hyperthermia   • Lidocaine Hcl Other (See Comments)   • Lactose   (Food Or Med) DIARRHEA   • Penicillins HIVES and RASH     Happened as a child       Current Outpatient Medications   Medication Sig Dispense Refill   • calcium carbonate (OS-DANIELITO) 1250 (500 Ca) MG tablet Take 500 mg by mouth. (Patient not taking: Reported on 5/10/2025)     • Cholecalciferol (Vitamin D) 50 mcg (2,000 units) tablet [None received]     • hydroCORTisone (CORTIZONE) 2.5 % cream  Place rectally two times daily Use as directed. Application Site: rectum     • calcium, oyster shell, 500 MG tablet [None received]     • pantoprazole (PROTONIX) 40 MG tablet Take 40 mg by mouth.     • predniSONE (DELTASONE) 5 MG tablet Take 15 mg by mouth.     • predniSONE (DELTASONE) 2.5 MG tablet TAKE 1 TABLET BY MOUTH EVERY MORNING WITH BREAKFAST. TAKE WITH OR AFTER A MEAL. TAKE IN ADDITION TO 15 MG FOR A TOTAL OF 17.5 MG (Patient not taking: Reported on 5/10/2025)     • predniSONE (DELTASONE) 20 MG tablet Take 1 tablet (20 mg) by mouth every morning with breakfast. Take with or after a meal. (Patient not taking: Reported on 5/10/2025)     • acetaminophen (TYLENOL) 500 MG tablet Take 500 mg by mouth. (Patient not taking: Reported on 5/10/2025)     • levothyroxine 100 MCG tablet Take 100 mcg by mouth. (Patient not taking: Reported on 5/10/2025)     • polyethylene glycol (MIRALAX) 17 g packet Take 17 g by mouth.     • pyridostigmine (MESTINON) 60 MG tablet Take 60 mg by mouth.     • sulfamethoxazole-trimethoprim (BACTRIM DS) 800-160 MG per tablet Take 1 tablet by mouth in the morning and 1 tablet in the evening. Do all this for 7 days. 14 tablet 0   • Aspirin Low Dose 81 MG EC tablet TAKE 1 TABLET BY MOUTH TWICE DAILY AT 9AM AND 9PM 60 tablet 0   • albuterol 108 (90 Base) MCG/ACT inhaler Inhale 2 puffs into the lungs every 6 hours as needed for Shortness of Breath or Wheezing.     • GuaiFENesin 1200 MG 12 hr tablet Take 1,200 mg by mouth every 12 hours as needed (Congestion).     • levothyroxine 100 MCG tablet Take 1 tablet by mouth daily. 90 tablet 1   • pyRIDostigmine Bromide 30 MG Tab Take 30 mg by mouth in the morning and 30 mg at noon and 30 mg in the evening. (Patient not taking: Reported on 5/10/2025) 9 tablet 0   • lisinopril (ZESTRIL) 10 MG tablet Take 1 tablet by mouth daily. 90 tablet 3   • isosorbide mononitrate (IMDUR) 30 MG 24 hr tablet Take 1 tablet by mouth daily. 90 tablet 3   • carvedilol (COREG)  12.5 MG tablet Take 1 tablet by mouth in the morning and 1 tablet in the evening. Take with meals. 180 tablet 3   • polyethylene glycol (MIRALAX) 17 GM/SCOOP powder Take 17 g by mouth daily as needed (constipation).     • lidocaine (Pain Relieving Lidocaine) 4 % patch Apply 1 patch topically daily. Apply 1 patch to affected area, Use one patch up to 12 hours as needed.     • acetaminophen (TYLENOL) 500 MG tablet Take 2 tablets by mouth 3 times daily as needed for Pain or Fever.     • docusate sodium (COLACE) 100 MG capsule Take 100 mg by mouth 2 times daily as needed for Constipation.       No current facility-administered medications for this visit.         Review of Systems:  Review of Systems   Genitourinary:  Positive for dysuria, frequency and urgency.   All other systems reviewed and are negative.      All other systems reviewed are negative    OBJECTIVE  Vitals: Visit Vitals  /72   Pulse (!) 55   Temp 97.9 °F (36.6 °C) (Temporal)   Resp 20   SpO2 96%       Physical Exam:  Physical Exam  Constitutional:       General: She is not in acute distress.     Appearance: She is well-developed.   HENT:      Right Ear: External ear normal.      Left Ear: External ear normal.      Neck: Neck supple.   Eyes:      Conjunctiva/sclera: Conjunctivae normal.   Cardiovascular:      Rate and Rhythm: Normal rate and regular rhythm.      Heart sounds: Normal heart sounds.   Pulmonary:      Effort: Pulmonary effort is normal.      Breath sounds: Normal breath sounds.   Abdominal:      General: There is no distension.      Palpations: Abdomen is soft.      Tenderness: There is no abdominal tenderness.      Comments: No flank tenderness.   Lymphadenopathy:      Cervical: No cervical adenopathy.       Results for orders placed or performed in visit on 05/10/25   Urinalysis With Microscopy & Culture If Indicated    Specimen: Urine clean catch   Result Value    COLOR, URINALYSIS Straw    APPEARANCE, URINALYSIS Clear    GLUCOSE,  URINALYSIS Negative    BILIRUBIN, URINALYSIS Negative    KETONES, URINALYSIS Negative    SPECIFIC GRAVITY, URINALYSIS 1.018     Comment: Measured by refractometry    OCCULT BLOOD, URINALYSIS Negative    PH, URINALYSIS 6.0    PROTEIN, URINALYSIS Negative    UROBILINOGEN, URINALYSIS 0.2    NITRITE, URINALYSIS Positive (A)    LEUKOCYTE ESTERASE, URINALYSIS Small (A)    SQUAMOUS EPITHELIAL, URINALYSIS 1 to 5    ERYTHROCYTES, URINALYSIS 1 to 2    LEUKOCYTES, URINALYSIS 1 to 5    BACTERIA, URINALYSIS Few (A)    HYALINE CASTS, URINALYSIS None Seen    MUCUS Present       ASSESSMENT/PLAN  UTI  I had a discussion with the patient about this condition.  Discussed typical cause, course of illness and prognosis.  Medication as ordered.  Should expect improvement over the next 48-72 hours and resolution within the next week.  If not or if worse seek immediate reevaluation in [facility] or with patient's pcp.  Patient verbalized understanding.    If symptoms should suddenly change or worsen, seek immediate reevaluation with PCP or return to Immediate Care.  The patient verbalized understanding.    Huber Ingram MD  5/10/2025

## (undated) DEVICE — LOCKING DEVICE RX & BIOPSY CAP

## (undated) DEVICE — MEDI-VAC NON-CONDUCTIVE SUCTION TUBING: Brand: CARDINAL HEALTH

## (undated) DEVICE — 3M™ RED DOT™ MONITORING ELECTRODE WITH FOAM TAPE AND STICKY GEL, 50/BAG, 20/CASE, 72/PLT 2570: Brand: RED DOT™

## (undated) DEVICE — SYRINGE/GUAGE ASSEMBLY

## (undated) DEVICE — SPHINCTEROTOME: Brand: HYDRATOME RX 44

## (undated) DEVICE — 1200CC GUARDIAN II: Brand: GUARDIAN

## (undated) DEVICE — CLIP LGT 11MM OPEN 2.8MM 235CM

## (undated) DEVICE — ENDOSCOPY PACK UPPER: Brand: MEDLINE INDUSTRIES, INC.

## (undated) DEVICE — MEDI-VAC SUCTION HANDLE REGULAR CAPACITY: Brand: CARDINAL HEALTH

## (undated) DEVICE — SYRINGE 10ML LL TIP

## (undated) DEVICE — Device: Brand: DEFENDO AIR/WATER/SUCTION AND BIOPSY VALVE

## (undated) DEVICE — FILTERLINE NASAL ADULT O2/CO2

## (undated) DEVICE — THE PADLOCK CLIP PRO-SELECT IS USED IN FLEXIBLE ENDOSCOPY FOR THE COMPRESSION OF TISSUE IN THE GASTROINTESTINAL TRACT, FOR HEMOSTASIS OR FOR TREATING LESIONS OF THE WALL OF THE GASTROINTESTINAL ORGANS.: Brand: PADLOCK CLIP PRO-SELECT

## (undated) DEVICE — ESOPHAGEAL BALLOON DILATATION CATHETER: Brand: CRE FIXED WIRE

## (undated) DEVICE — DEVICE SPCMN RTRVL RAPTOR MINI

## (undated) DEVICE — MEDI-VAC YANKAUER SUCTION HANDLE W/BULBOUS TIP: Brand: CARDINAL HEALTH

## (undated) DEVICE — Device

## (undated) DEVICE — KIT PEG GASTRO 20FR

## (undated) DEVICE — AIRLIFE™ ADULT OXYGEN MASK VINYL, UNDER-THE-CHIN STYLE, MEDIUM CONCENTRATION MASK WITH 7 FEET (2.1 M) CRUSH-RESISTANT OXYGEN TUBING: Brand: AIRLIFE™

## (undated) DEVICE — REM POLYHESIVE ADULT PATIENT RETURN ELECTRODE: Brand: VALLEYLAB

## (undated) DEVICE — BOWLS UTILITY 16OZ

## (undated) DEVICE — DURACLIP 16MM 235CM

## (undated) DEVICE — RETRIEVAL BALLOON CATHETER: Brand: EXTRACTOR™ PRO RX

## (undated) DEVICE — FIAPC® PROBE W/ FILTER 2200 C OD 2.3MM/6.9FR; L 2.2M/7.2FT: Brand: ERBE

## (undated) DEVICE — SYRINGE 50ML LL TIP

## (undated) DEVICE — WIREGUIDED RETRIEVAL BASKET: Brand: TRAPEZOID RX

## (undated) NOTE — LETTER
Chanda Springer 89  [address] [facility]  Authorization for Surgical Operation and Procedure     Date:___________                                                                                                         Time:__________ 4.   Should the need arise during my operation or immediate post-operative period, I also consent to the administration of blood and/or blood products.   Further, I understand that despite careful testing and screening of blood or blood products by toño 8.   I recognize that in the event my procedure results in extended X-Ray/fluoroscopy time, I may develop a skin reaction. 9.  If I have a Do Not Attempt Resuscitation (DNAR) order in place, that status will be suspended while in the operating room, proc 1. IOrtega agree to be cared for by an anesthesiologist, who is specially trained to monitor me and give me medicine to put me to sleep or keep me comfortable during my procedure    I understand that my anesthesiologist is not an employee or ag 5. My doctor has explained to me other choices available to me for my care (alternatives).   6. Pregnant Patients (“epidural”):  I understand that the risks of having an epidural (medicine given into my back to help control pain during labor), include itchi

## (undated) NOTE — LETTER
Chanda Springer 182  295 [facility] S, 209 [facility]  Authorization for Surgical Operation and Procedure     Date:___________                                                                                                         Time:__________ 4.   Should the need arise during my operation or immediate post-operative period, I also consent to the administration of blood and/or blood products.   Further, I understand that despite careful testing and screening of blood or blood products by toño 8.   I recognize that in the event my procedure results in extended X-Ray/fluoroscopy time, I may develop a skin reaction. 9.  If I have a Do Not Attempt Resuscitation (DNAR) order in place, that status will be suspended while in the operating room, proc 1. IMarlen agree to be cared for by an anesthesiologist, who is specially trained to monitor me and give me medicine to put me to sleep or keep me comfortable during my procedure    I understand that my anesthesiologist is not an employee or ag 5. My doctor has explained to me other choices available to me for my care (alternatives).   6. Pregnant Patients (“epidural”):  I understand that the risks of having an epidural (medicine given into my back to help control pain during labor), include itchi

## (undated) NOTE — LETTER
[facility] [address], [address]  Consent for Procedure/Sedation    Date: 10/26/2020   Time: 1150      1.  I authorize the performance upon Bessy Batch the following: Cerebral angiogram, possible thrombectomy, possible angiop Signature of person authorized                                           Relationship to  to consent for patient: ___________________________   patient: ___________________    Witness: ______________________________________  Date: _____________________

## (undated) NOTE — IP AVS SNAPSHOT
Patient Demographics     Address  [address] [place] [address] Phone  [phone] [facility])      Emergency Contact(s)     Name Relation Home Work [address] [facility] Spouse   [phone]      Allergies as of 11/8/2020  Review s ; DO NOT submerge the procedure site for 7 days (no bath tubs or pools)   o This includes dishwashing/submersion of the wrist, if the wrist was used   After 24 hours, you must remove the bandage   ;  Ok to shower and wash the procedure site gently with soap ; These medications help to prevent blockage at the stent site.  If another physician or dentist asks you to stop your antiplatelet medication, you need to consult your interventional neuroradiologist first so they can discuss together, the risks that may b Specialties: Radiology, Diagnostic, Interventional, Neuro Radiology, Ruthie Mac  Why: appointment time 11:00 am, follow up after mechanical thrombectomy  Contact information:  [address] Where to Get Your Medications      These medications were sent to [place] [address] [address] [place] [address], [address] [place] [address], [phone], [phone]  [address] [address]    Phone: [phone]   furosemide 78 Vitals  111/58 Filed at 11/08/2020 1230   Pulse  89 Filed at 11/08/2020 1230   Resp  18 Filed at 11/08/2020 1230   Temp  98.3 °F (36.8 °C) Filed at 11/08/2020 1230   SpO2  96 % Filed at 11/08/2020 1230      Patient's Most Recent Weight       Most Recent [facility] Location [address] [facility] [address] Attending Mc Chen MD   Hosp Day # 0 PCP Galen Ohara     Chief Complaint: AMS, fall    History of Present Illness: Raysa Valle is a 79year old female with Afibm not on AC, Gtube due to chronic dysphagia post Respiratory: Clear to auscultation bilaterally. No wheezes. No rhonchi. Cardiovascular: S1, S2. IRIR  Chest and Back: No tenderness or deformity. Abdomen: Soft, nontender, nondistended. Positive bowel sounds. No rebound, guarding or organomegaly.   Neuro Patient will require inpatient services that will reasonably be expected to span two midnight's based on the clinical documentation in H+P.    Based on patients current state of illness, I anticipate that, after discharge, patient will require TBD.[NB.1] CT/CTA CONCLUSION:  Large thrombus in the right M1 segment extending from the distal supraclinoid right internal carotid artery into possible M2 segment arteries is noted.   Lack of flow in multiple M2 and M3 branches is noted  She met tPA window and given • IR INTRA ARTERIAL STROKE INTERVENTION  10/26/2020        • LUMPECTOMY RIGHT           •  potassium chloride (KLOR-CON) powder packet 40 mEq, 40 mEq, Oral, Q4H    •  Metoprolol Tartrate (LOPRESSOR) tab 50 mg, 50 mg, Per G Tube, 2x Daily(Beta Blocker)    • •  [] diphenhydrAMINE HCl (BENADRYL) IV PUSH injection 50 mg, 50 mg, Intravenous, Once PRN    •  [COMPLETED] Heparin Sodium (Porcine) 5000 UNIT/ML injection, , ,     •  [COMPLETED] Heparin Sodium (Porcine) 5000 UNIT/ML injection, , ,     •  [COMPLET Last data filed at 10/29/2020 2100  Gross per 24 hour   Intake 60 ml   Output —   Net 60 ml       Physical Exam:                                      General: Alert, cooperative, no distress, appears stated age.   Head:  Normocephalic, without obvious abnor Based on patient's current functional status, pt would benefit from Acute Rehabilitation, working with PT/OT/SLP to upgrade present functional status, provide family training, assess home equipment and assistive device needs.             24 hr rehab Acute encephalopathy    JORGE (acute kidney injury) ([facility] [address]    Chronic atrial fibrillation (HCC)    Oral phase dysphagia    Cerebrovascular accident (CVA) due to thrombosis of right middle cerebral artery (HCC)    Atrial fibrillation with RVR (HCC)    SBO (s -   Sitting down on and standing up from a chair with arms (e.g., wheelchair, bedside commode, etc.): A Little   -   Moving from lying on back to sitting on the side of the bed?: A Little[AE.2]   How much help from another person does the patient currently Patient continues to demonstrate deficits in strength, balance, endurance, mobility and functional independence, and will benefit from continued skilled PT. Continue to rec acute rehab at [place]. Will continue to follow up for further session.         Gita Sharma Cerebrovascular accident (CVA) due to thrombosis of right middle cerebral artery (HCC)    Atrial fibrillation with RVR (HCC)    SBO (small bowel obstruction) (HCC)    Gastrointestinal hemorrhage    High risk medication use      Past Medical History  Past -   Moving to and from a bed to a chair (including a wheelchair)?: A Little   -   Need to walk in hospital room?: A Little   -   Climbing 3-5 steps with a railing?: A Lot       AM-PAC Score:  Raw Score: 17   Approx Degree of Impairment: 50.57%   Standardiz Rehab Potential : Good  Frequency (Obs): Daily    CURRENT GOALS   Goal #1 Patient is able to demonstrate supine - sit EOB @ level: supervision      Goal #2 Patient is able to demonstrate transfers Sit to/from Stand at assistance level: minimum assistance-m Chronic atrial fibrillation (HCC)    Oral phase dysphagia    Cerebrovascular accident (CVA) due to thrombosis of right middle cerebral artery (HCC)    Atrial fibrillation with RVR (HCC)    SBO (small bowel obstruction) (HCC)    Gastrointestinal hemorrhag Standardized Score (AM-PAC Scale): 38.66  CMS Modifier (G-Code): CK[MM. 3]    FUNCTIONAL TRANSFER ASSESSMENT[MM.1]  Supine to Sit : Moderate assistance  Sit to Stand: Moderate assistance[MM. 3]    Skilled Therapy Provided:   PPE worn by writer: droplet mask, Patient will perform upper body dressing:  with min assist and with cues  Patient will perform toileting: with mod assist     Functional Transfer Goals  Patient will transfer to toilet:  with min assist and with cues     UE Exercise Program Goal  Patient w Patient seen for cognitive-communication therapy this date as per POC. Targeted visual processing of paragraph level information via yes/no and multiple choice questions in which patient was 80% acc independently and given increased time.  Visual reasoning CR.3 - Denisha Hawthorne, SLP on 11/6/2020  4:36 PM               SLP Note signed by CHIARA Meehan at 11/6/2020  4:38 PM  Version 1 of 2    Author: CHIARA Meehan Service: Rehab Author Type: SPEECH-LANGUAGE PATHOLOGIST    Filed: 11/6 Goal #4 Complete further Pt/family education/training regarding cognitive communication deficits associated with R-CVA  Not Addressed[CR.2]        FOLLOW UP  Follow Up Needed: Yes  SLP Follow-up Date: 11/09/20  Number of Visits to Meet Established Goals: 6

## (undated) NOTE — LETTER
Chanda Springer 182  295 [facility] [address], 209 [facility]  Authorization for Surgical Operation and Procedure     Date:___________                                                                                                         Time:__________ are some, but not all, of the potential risks that can occur: fever and allergic reactions, hemolytic reactions, transmission of diseases such as Hepatitis, AIDS and Cytomegalovirus (CMV) and fluid overload.   In the event that I wish to have an autologous surgeon or my attending physician will determine when the applicable recovery period ends for purposes of reinstating the DNAR order.   10. Patients having a sterilization procedure: I understand that if the procedure is successful the results will be perma a. Allow the anesthesiologist (anesthesia doctor) to give me medicine and do additional procedures as necessary.  Some examples are: Starting or using an “IV” to give me medicine, fluids or blood during my procedure, and having a breathing tube placed to he (“spinal”, “epidural”, & “nerve blocks”): I understand that rare but potential complications include headache, bleeding, infection, seizure, irregular heart rhythms, and nerve injury.     I can change my mind about having anesthesia services at any time be

## (undated) NOTE — LETTER
Chanda Springer 182  295 [facility] [address], 209 [facility]  Authorization for Surgical Operation and Procedure     Date:___________                                                                                                         Time:__________ 4.   Should the need arise during my operation or immediate post-operative period, I also consent to the administration of blood and/or blood products.   Further, I understand that despite careful testing and screening of blood or blood products by toño 8.   I recognize that in the event my procedure results in extended X-Ray/fluoroscopy time, I may develop a skin reaction. 9.  If I have a Do Not Attempt Resuscitation (DNAR) order in place, that status will be suspended while in the operating room, proc 1. Heriberto HOOVER agree to be cared for by an anesthesiologist, who is specially trained to monitor me and give me medicine to put me to sleep or keep me comfortable during my procedure    I understand that my anesthesiologist is not an employee or ag 5. My doctor has explained to me other choices available to me for my care (alternatives).   6. Pregnant Patients (“epidural”):  I understand that the risks of having an epidural (medicine given into my back to help control pain during labor), include itchi

## (undated) NOTE — LETTER
1/4/2021    Dear Ernesto Guadarrama,  I had the pleasure of seeing Esequiel Zacarias today,1/4/2021   , for a 2.5 mo follow-up after emergency stroke embolectomy.   As you well remember, Esequiel Zacarias is a 79year old year old  female with history of Af Respiratory: cough, shortness of breath and COPD  Eyes: no symptoms reported  Skin: J-peg   Mouth & throat: excessive dry mouth and difficulty swallowing  Neck: pain and stiffness  Nose: no symptoms reported post nasal drip  Psychiatric: sadness        Bar • Montelukast Sodium 10 MG Oral Tab Take 10 mg by mouth nightly. • metoprolol Tartrate 25 MG Oral Tab Take 1 tablet (25 mg total) by mouth 2x Daily(Beta Blocker).  (Patient not taking: Reported on 1/4/2021 ) 60 tablet 0      Penicillin G, Statins, Sulfa 1. Flash Yanez is actually doing relatively well post right MCA embolectomy. Neurologically, while she demonstrates some generalized weakness, she is intact without any focal neurologic deficit. She was able to walk for me in clinic.   I mentioned to her that

## (undated) NOTE — LETTER
Chanda Springer 182  295 [facility] [address], 209 [facility]  Authorization for Surgical Operation and Procedure     Date:___________                                                                                                         Time:__________ reactions, hemolytic reactions, transmission of diseases such as Hepatitis, AIDS and Cytomegalovirus (CMV) and fluid overload. In the event that I wish to have an autologous transfusion of my own blood, or a directed donor transfusion.   I will discuss thi ends for purposes of reinstating the DNAR order.   10. Patients having a sterilization procedure: I understand that if the procedure is successful the results will be permanent and it will therefore be impossible for me to inseminate, conceive, or bear chil additional procedures as necessary. Some examples are: Starting or using an “IV” to give me medicine, fluids or blood during my procedure, and having a breathing tube placed to help me breathe when I’m asleep (intubation).  In the event that my heart stops complications include headache, bleeding, infection, seizure, irregular heart rhythms, and nerve injury.     I can change my mind about having anesthesia services at any time before I get the medicine.    ____________________________________________________

## (undated) NOTE — LETTER
Chanda Springer 182  295 [facility] [address], 209 [facility]  Authorization for Surgical Operation and Procedure     Date:___________                                                                                                         Time:__________ potential risks that can occur: fever and allergic reactions, hemolytic reactions, transmission of diseases such as Hepatitis, AIDS and Cytomegalovirus (CMV) and fluid overload.   In the event that I wish to have an autologous transfusion of my own blood, o will determine when the applicable recovery period ends for purposes of reinstating the DNAR order.   10. Patients having a sterilization procedure: I understand that if the procedure is successful the results will be permanent and it will therefore be impo (anesthesia doctor) to give me medicine and do additional procedures as necessary.  Some examples are: Starting or using an “IV” to give me medicine, fluids or blood during my procedure, and having a breathing tube placed to help me breathe when I’m asleep blocks”): I understand that rare but potential complications include headache, bleeding, infection, seizure, irregular heart rhythms, and nerve injury.     I can change my mind about having anesthesia services at any time before I get the medicine.    ____

## (undated) NOTE — LETTER
Chanda Springer 182  [address] [facility] S, 209 [facility]  Authorization for Surgical Operation and Procedure     Date:___________                                                                                                         Time:__________ 4.   Should the need arise during my operation or immediate post-operative period, I also consent to the administration of blood and/or blood products.   Further, I understand that despite careful testing and screening of blood or blood products by toño 8.   I recognize that in the event my procedure results in extended X-Ray/fluoroscopy time, I may develop a skin reaction. 9.  If I have a Do Not Attempt Resuscitation (DNAR) order in place, that status will be suspended while in the operating room, proc 1. IJesenia agree to be cared for by an anesthesiologist, who is specially trained to monitor me and give me medicine to put me to sleep or keep me comfortable during my procedure    I understand that my anesthesiologist is not an employee or ag 5. My doctor has explained to me other choices available to me for my care (alternatives).   6. Pregnant Patients (“epidural”):  I understand that the risks of having an epidural (medicine given into my back to help control pain during labor), include itchi

## (undated) NOTE — LETTER
Chanda Springer 182  295 [facility] S, 209 [facility]  Authorization for Surgical Operation and Procedure     Date:___________                                                                                                         Time:__________ occur: fever and allergic reactions, hemolytic reactions, transmission of diseases such as Hepatitis, AIDS and Cytomegalovirus (CMV) and fluid overload.   In the event that I wish to have an autologous transfusion of my own blood, or a directed donor transf applicable recovery period ends for purposes of reinstating the DNAR order.   10. Patients having a sterilization procedure: I understand that if the procedure is successful the results will be permanent and it will therefore be impossible for me to insemin me medicine and do additional procedures as necessary. Some examples are: Starting or using an “IV” to give me medicine, fluids or blood during my procedure, and having a breathing tube placed to help me breathe when I’m asleep (intubation).  In the event t but potential complications include headache, bleeding, infection, seizure, irregular heart rhythms, and nerve injury.     I can change my mind about having anesthesia services at any time before I get the medicine.    ______________________________________

## (undated) NOTE — IP AVS SNAPSHOT
[address]            (For Outpatient Use Only) Initial Admit Date: 10/26/2020   Inpt/Obs Admit Date: Inpt: 10/26/20 / Obs: N/A   Discharge Date:    Pavel Binet:  [de-identified]   MRN: [de-identified]   CSN: [id number]   CEID: [id number] Payor:  Plan:    Group Number:  Insurance Type:    Subscriber Name:  Subscriber :    Subscriber ID:  Pt Rel to Subscriber:    Hospital Account Financial Class: Medicare    2020

## (undated) NOTE — LETTER
[facility] [address], [address] [place] [address]    Consent for Anesthesia   1.    IKimberly agree to be cared for by a physician anesthesiologist alone and/or with a nurse anesthetist, who is specially trained to monitor me and give me · Rare risks include: remembering what happened during my procedure, allergic reactions to medications, injury to my airway, heart, lungs, vision, nerves, or muscles and in extremely rare instances death.   5. My doctor has explained to me other choices wyatt Patient Name: Ciara Rome     : 10/9/1940                 Printed: 2021 at 2:17 PM    Medical Record #: [id number]                                            Page 1 of 1

## (undated) NOTE — LETTER
[facility] [address], [address] [place] [address]    Consent for Anesthesia   1.    IKeli agree to be cared for by a physician anesthesiologist alone and/or with a nurse anesthetist, who is specially trained to monitor me and give me · Rare risks include: remembering what happened during my procedure, allergic reactions to medications, injury to my airway, heart, lungs, vision, nerves, or muscles and in extremely rare instances death.   5. My doctor has explained to me other choices wyatt Patient Name: Teetee Stout     : 10/9/1940                 Printed: 10/26/2020 at 11:53 AM    Medical Record #: [id number]                                            Page 1 of 1

## (undated) NOTE — LETTER
[facility] [address], [address]  Consent for Procedure/Sedation    Date: 10/26/2020   Time: 11:50      1.  I authorize the performance upon Karen Ureña the following: Cerebral angiogram, possible thrombectomy, possible angio Signature of person authorized                                           Relationship to  to consent for patient: ___________________________   patient: ___________________    Witness: ______________________________________  Date: _____________________

## (undated) NOTE — LETTER
Chanda Springer 182  295 [facility] S, 209 [facility]  Authorization for Surgical Operation and Procedure     Date:___________                                                                                                         Time:__________ 4.   Should the need arise during my operation or immediate post-operative period, I also consent to the administration of blood and/or blood products.   Further, I understand that despite careful testing and screening of blood or blood products by toño 8.   I recognize that in the event my procedure results in extended X-Ray/fluoroscopy time, I may develop a skin reaction. 9.  If I have a Do Not Attempt Resuscitation (DNAR) order in place, that status will be suspended while in the operating room, proc 1. ILuz Marina agree to be cared for by an anesthesiologist, who is specially trained to monitor me and give me medicine to put me to sleep or keep me comfortable during my procedure    I understand that my anesthesiologist is not an employee or ag 5. My doctor has explained to me other choices available to me for my care (alternatives).   6. Pregnant Patients (“epidural”):  I understand that the risks of having an epidural (medicine given into my back to help control pain during labor), include itchi

## (undated) NOTE — LETTER
Chanda Springer 182  [address] [facility] [address], 209 [facility]  Authorization for Surgical Operation and Procedure     Date:___________                                                                                                         Time:__________ potential risks that can occur: fever and allergic reactions, hemolytic reactions, transmission of diseases such as Hepatitis, AIDS and Cytomegalovirus (CMV) and fluid overload.   In the event that I wish to have an autologous transfusion of my own blood, o will determine when the applicable recovery period ends for purposes of reinstating the DNAR order.   10. Patients having a sterilization procedure: I understand that if the procedure is successful the results will be permanent and it will therefore be impo (anesthesia doctor) to give me medicine and do additional procedures as necessary.  Some examples are: Starting or using an “IV” to give me medicine, fluids or blood during my procedure, and having a breathing tube placed to help me breathe when I’m asleep blocks”): I understand that rare but potential complications include headache, bleeding, infection, seizure, irregular heart rhythms, and nerve injury.     I can change my mind about having anesthesia services at any time before I get the medicine.    ____

## (undated) NOTE — LETTER
[facility] [address], [address]  Consent for Procedure/Sedation    Date: 9/1/21    Time:       1. I authorize the performance upon Raysa Leonard the following:  Kyphoplasty     2.  I authorize Dr. Yahaira Edgar (and whomever is de Record #: [id number]

## (undated) NOTE — LETTER
Chanda Springer 89  [address] [facility] [address], [address] [facility]  Authorization for Surgical Operation and Procedure     Date:___________                                                                                                         Time:__________ reactions, hemolytic reactions, transmission of diseases such as Hepatitis, AIDS and Cytomegalovirus (CMV) and fluid overload. In the event that I wish to have an autologous transfusion of my own blood, or a directed donor transfusion.   I will discuss thi ends for purposes of reinstating the DNAR order.   10. Patients having a sterilization procedure: I understand that if the procedure is successful the results will be permanent and it will therefore be impossible for me to inseminate, conceive, or bear chil additional procedures as necessary. Some examples are: Starting or using an “IV” to give me medicine, fluids or blood during my procedure, and having a breathing tube placed to help me breathe when I’m asleep (intubation).  In the event that my heart stops complications include headache, bleeding, infection, seizure, irregular heart rhythms, and nerve injury.     I can change my mind about having anesthesia services at any time before I get the medicine.    ____________________________________________________

## (undated) NOTE — IP AVS SNAPSHOT
[address]            (For Outpatient Use Only) Initial Admit Date: 8/22/2021   Inpt/Obs Admit Date: Inpt: 8/22/21 / Obs: N/A   Discharge Date:    Skip Gege:  [de-identified]   MRN: [de-identified]   CSN: [id number]   CEID: [id number] Pt Rel to Subscriber:    Hospital Account Financial Class: Medicare    September 3, 2021

## (undated) NOTE — LETTER
Chanda Springer 182  [address] [facility] [address], [address] [facility]  Authorization for Surgical Operation and Procedure     Date:___________                                                                                                         Time:__________ hemolytic reactions, transmission of diseases such as Hepatitis, AIDS and Cytomegalovirus (CMV) and fluid overload. In the event that I wish to have an autologous transfusion of my own blood, or a directed donor transfusion.   I will discuss this with my p purposes of reinstating the DNAR order. 10. Patients having a sterilization procedure: I understand that if the procedure is successful the results will be permanent and it will therefore be impossible for me to inseminate, conceive, or bear children.   I procedures as necessary. Some examples are: Starting or using an “IV” to give me medicine, fluids or blood during my procedure, and having a breathing tube placed to help me breathe when I’m asleep (intubation).  In the event that my heart stops working pro include headache, bleeding, infection, seizure, irregular heart rhythms, and nerve injury.     I can change my mind about having anesthesia services at any time before I get the medicine.    __________________________________________________________________

## (undated) NOTE — LETTER
Chanda Springer 182  [address] [facility] [address], [address] [facility]  Authorization for Surgical Operation and Procedure     Date:___________                                                                                                         Time:__________ potential risks that can occur: fever and allergic reactions, hemolytic reactions, transmission of diseases such as Hepatitis, AIDS and Cytomegalovirus (CMV) and fluid overload.   In the event that I wish to have an autologous transfusion of my own blood, o will determine when the applicable recovery period ends for purposes of reinstating the DNAR order.   10. Patients having a sterilization procedure: I understand that if the procedure is successful the results will be permanent and it will therefore be impo (anesthesia doctor) to give me medicine and do additional procedures as necessary.  Some examples are: Starting or using an “IV” to give me medicine, fluids or blood during my procedure, and having a breathing tube placed to help me breathe when I’m asleep blocks”): I understand that rare but potential complications include headache, bleeding, infection, seizure, irregular heart rhythms, and nerve injury.     I can change my mind about having anesthesia services at any time before I get the medicine.    ____

## (undated) NOTE — LETTER
[facility] [address], [address] [place] [address]    Consent for Anesthesia   1.    IKaren agree to be cared for by a physician anesthesiologist alone and/or with a nurse anesthetist, who is specially trained to monitor me and give me · Rare risks include: remembering what happened during my procedure, allergic reactions to medications, injury to my airway, heart, lungs, vision, nerves, or muscles and in extremely rare instances death.   5. My doctor has explained to me other choices wyatt Patient Name: Daiana Jackson     : 10/9/1940                 Printed: 10/26/2020 at 11:50 AM    Medical Record #: [id number]                                            Page 1 of 1

## (undated) NOTE — IP AVS SNAPSHOT
Patient Demographics     Address  [address] [place] [id number] Phone  [phone] [facility])  [phone] (Mobile) *Preferred* E-mail Address  [email or web address]      Emergency Contact(s)     Name Relation Home Work [address] [facility] Your medication list      TAKE these medications       Instructions Authorizing Provider Morning Afternoon Evening As Needed   acetaminophen-codeine 120-12 MG/5ML Soln  Commonly known as: TYLENOL #3      5-10 mL by Per G Tube route every 8 (eight) hours as mg tablet  Commonly known as: ZOFRAN      4 mg by Per G Tube route every 4 (four) hours as needed. Osmolite HN Liqd      8 oz by Per G Tube route 3 (three) times daily.           sodium chloride 7 % Nebu      Take 2 mL by nebulization 2 (two) times gemfibrozil (LOPID) tab 300 mg 09/02/21 2018 Given      [id number] gemfibrozil (LOPID) tab 300 mg 09/03/21 0955 Given      [id number] morphINE sulfate (PF) 2 MG/ML injection 2 mg (Or Linked Group #1) 09/02/21 1740 Given      [id number] muscle rub ([address] E. Coli Pcr Negative     Enterotoxigenic E. Coli Pcr Negative     Shig Txn 1/2 Prod E. Coli Pcr Negative     Shig/Enteroinvasive E.  Coli Pcr Negative     Cryptosporidium Pcr Negative     Cyclospora Cayetanensis Pcr Negative     Entamoeba Histolytica Pcr Ne fall.  Patient states that she lost her balance and fell backwards striking a dresser along her mid to lower back. She complains of significant pain as well as some bruising. She denies head injury or loss of consciousness.   Denies chest pain, fever, chi smokeless tobacco. She reports previous alcohol use. She reports that she does not use drugs.     Family History:   Family History   Problem Relation Age of Onset   • Hypertension Mother    • Heart Attack Mother         Allergies:   Penicillin G needed. , Disp: , Rfl:   nystatin [id number] UNIT/GM External Cream, Apply 1 Application topically 2 (two) times daily.  To affected area , Disp: , Rfl:   gemfibrozil 600 MG Oral Tab, 300 mg by Per G Tube route 2 (two) times a day.  , Disp: , Rfl:   Budesonide --     143 139  --    K  --  3.3*  --  4.3    112 110  --    CO2 29.0 26.0 29.0  --    ALKPHO 61  --  65  --    AST  --   --   --  29   ALT 31  --  36  --    BILT 0.4  --  0.5  --    TP 5.9*  --  6.1*  --        Estimated Creatinine Clearance: 1. Consult to Gastroenterology [[id number]] ordered by Sheila Chaudhary MD at [address]                       Gastroenterology [address] [place] [address] Gastroenterology    Karen Ureña Patient Status:  Inpatient    10/9/ Flatulence/gas pain/belching    • High cholesterol    • Migraines    • Osteoarthritis    • Osteoporosis    • Pain in joints    • Pneumonia due to organism    • Problems with swallowing     since 1995- due to scar tissue resulted from surgery  for Cyst maggie MG/ML injection 2 mg, 2 mg, Intravenous, Once  [COMPLETED] ondansetron (ZOFRAN) injection 4 mg, 4 mg, Intravenous, Once  [COMPLETED] morphINE sulfate (PF) 4 MG/ML injection 4 mg, 4 mg, Intravenous, Once  acetaminophen (TYLENOL) tab 650 mg, 650 mg, Oral, Q4 breast cancer           ENT: The patient reports no hoarseness of voice, hearing loss, sinus congestion, tinnitus           Neurologic: + CVA[MG.2]  PE: /70 (BP Location: Right arm)   Pulse 96   Temp 98.5 °F (36.9 °C) (Oral)   Resp 18   Wt 100 lb 5 o consulted to evaluate nausea, non-bloody vomiting + mild lower abd discomfort and loose stools. No overt GI bleeding.  Symptoms maybe d/t narcotics however will r/o infection (pt with hx of C Diff and recently required multiple admissions), ileus, obstructi Type: Physical Therapist    Filed: 9/1/2021  3:24 PM Date of Service: 9/1/2021  3:12 PM Status: Signed    : Baldo Moya PT (Physical Therapist)        PHYSICAL THERAPY TREATMENT NOTE - INPATIENT    Room Number: 368/368-A     Session: 2  (orders to left metatarsal bone      Past Medical History  Past Medical History:   Diagnosis Date   • Abdominal pain    • Acute, but ill-defined, cerebrovascular disease    • Arrhythmia     a-fib   • Bad breath    • Cancer ([facility] [address]     breast cancer- right 2010   • Varsha Management Techniques: Repositioning; Activity promotion; Body mechanics    BALANCE                                                                                                                     Static Sitting: Poor  Dynamic Sitting: Dependent balance requiring mod/max assist at times, occasional cga. Mod assist to scoot to EOB. Total assist to apply LSO with attention to draw G-tube through slit in brace. Sit to stand with mod assist of 2 with cueing.  Shuffle steps to chair with mod/max assist  9/1/21: down grade to min assist   Goal #3 Patient is able to ambulate 3 feet with assist device: walker - rolling at assistance level: moderate assistance - met 8/26/2021      Upgrade to 50ft CGA with use of RW   9/1/21: downgrade to 10 ft with r/w and m fx and L foot 5th proximal phalanx fx. Also with possible ileus vs SBO, conversion of J tube to G tube 8/26. Pt s/p kyphoplasty 9/1. History of CVA and paralytic ileus.      Problem List  Principal Problem:    Compression fracture of L2 vertebra, initial en INTRA ARTERIAL STROKE INTERVENTION  10/26/2020        • LUMPECTOMY RIGHT     • OTHER      surgery to remove cyst in throat 1980's   • UPPER GI ENDOSCOPY,EXAM         SUBJECTIVE  Pt states \"It's at least a 10\" RE pain, at rest    Patient self-stated goal chair. Reinforced call don't fall with pt/visitors. Recommend sit to stand lift with nursing staff. Patient End of Session: Up in chair;Needs met;Call light within reach;RN aware of session/findings;Bracing education provided; All patient questions and 8/31/2021  4:16 PM  Version 1 of 1    Author: Connor Rome OT Service: Rehab Author Type: Occupational Therapist    Filed: 8/31/2021  4:16 PM Date of Service: 8/31/2021  1:45 PM Status: Signed    : BUDDY DamianOccupational [place] Rodriguez of overt s/s of aspiration. SLP will sign off but remains available for consult should patient exhibit s/s of oropharyngeal dysphagia following initiation of PO intake. Discussed with RN.        RECOMMENDATIONS[CJ.1]   Diet Recommendations - Solids: Regul pt is receving PT and OT therapy- using walker but wheelchair if long distances   • Wears glasses    • Weight loss        Prior Living Situation: Home with support  Diet Prior to Admission: Regular; Thin liquids  Precautions: Aspiration[CJ.2]    Patient/Fam INFLUENZA 09/13/19     INFLUENZA 09/18/18     INFLUENZA 10/20/17     INFLUENZA 09/30/17     INFLUENZA 10/26/16     Pneumococcal (Prevnar 13) 07/11/17     Pneumovax 23 08/03/15     Zoster Vaccine Live (Zostavax) 01/01/14       Multidisciplinary Problems